# Patient Record
Sex: FEMALE | Race: WHITE | NOT HISPANIC OR LATINO | Employment: OTHER | URBAN - METROPOLITAN AREA
[De-identification: names, ages, dates, MRNs, and addresses within clinical notes are randomized per-mention and may not be internally consistent; named-entity substitution may affect disease eponyms.]

---

## 2018-10-11 ENCOUNTER — TELEPHONE (OUTPATIENT)
Dept: NEUROSURGERY | Facility: CLINIC | Age: 58
End: 2018-10-11

## 2018-10-11 DIAGNOSIS — M54.12 CERVICAL RADICULOPATHY: ICD-10-CM

## 2018-10-11 DIAGNOSIS — M48.02 SPINAL STENOSIS OF CERVICAL REGION: Primary | ICD-10-CM

## 2018-10-11 DIAGNOSIS — M47.22 CERVICAL SPONDYLOSIS WITH RADICULOPATHY: ICD-10-CM

## 2018-10-11 DIAGNOSIS — G95.9 CERVICAL MYELOPATHY: ICD-10-CM

## 2018-10-24 ENCOUNTER — HOSPITAL ENCOUNTER (OUTPATIENT)
Dept: RADIOLOGY | Facility: HOSPITAL | Age: 58
Discharge: HOME | End: 2018-10-24
Attending: NEUROLOGICAL SURGERY
Payer: COMMERCIAL

## 2018-10-24 ENCOUNTER — OFFICE VISIT (OUTPATIENT)
Dept: NEUROSURGERY | Facility: CLINIC | Age: 58
End: 2018-10-24
Payer: COMMERCIAL

## 2018-10-24 VITALS
HEART RATE: 79 BPM | TEMPERATURE: 98 F | DIASTOLIC BLOOD PRESSURE: 76 MMHG | SYSTOLIC BLOOD PRESSURE: 131 MMHG | WEIGHT: 119.31 LBS | BODY MASS INDEX: 23.3 KG/M2

## 2018-10-24 DIAGNOSIS — G95.9 CERVICAL MYELOPATHY: ICD-10-CM

## 2018-10-24 DIAGNOSIS — G56.21 ULNAR NEUROPATHY OF RIGHT UPPER EXTREMITY: ICD-10-CM

## 2018-10-24 DIAGNOSIS — M48.02 SPINAL STENOSIS OF CERVICAL REGION: ICD-10-CM

## 2018-10-24 DIAGNOSIS — M54.12 CERVICAL RADICULOPATHY: ICD-10-CM

## 2018-10-24 DIAGNOSIS — M48.02 SPINAL STENOSIS OF CERVICAL REGION: Primary | ICD-10-CM

## 2018-10-24 PROCEDURE — 72141 MRI NECK SPINE W/O DYE: CPT | Mod: 26,,, | Performed by: RADIOLOGY

## 2018-10-24 PROCEDURE — 72125 CT NECK SPINE W/O DYE: CPT | Mod: TC

## 2018-10-24 PROCEDURE — 72125 CT NECK SPINE W/O DYE: CPT | Mod: 26,,, | Performed by: RADIOLOGY

## 2018-10-24 PROCEDURE — 99999 PR PBB SHADOW E&M-EST. PATIENT-LVL III: CPT | Mod: PBBFAC,,, | Performed by: NEUROLOGICAL SURGERY

## 2018-10-24 PROCEDURE — 3078F DIAST BP <80 MM HG: CPT | Mod: CPTII,S$GLB,, | Performed by: NEUROLOGICAL SURGERY

## 2018-10-24 PROCEDURE — 3075F SYST BP GE 130 - 139MM HG: CPT | Mod: CPTII,S$GLB,, | Performed by: NEUROLOGICAL SURGERY

## 2018-10-24 PROCEDURE — 3008F BODY MASS INDEX DOCD: CPT | Mod: CPTII,S$GLB,, | Performed by: NEUROLOGICAL SURGERY

## 2018-10-24 PROCEDURE — 72141 MRI NECK SPINE W/O DYE: CPT | Mod: TC

## 2018-10-24 PROCEDURE — 99214 OFFICE O/P EST MOD 30 MIN: CPT | Mod: S$GLB,,, | Performed by: NEUROLOGICAL SURGERY

## 2018-10-24 RX ORDER — ASPIRIN 325 MG
325 TABLET ORAL
COMMUNITY
Start: 2018-09-24 | End: 2021-06-16

## 2018-10-24 RX ORDER — AZILSARTAN KAMEDOXOMIL 80 MG/1
TABLET ORAL
Refills: 1 | Status: ON HOLD | COMMUNITY
Start: 2018-08-17 | End: 2021-06-18

## 2018-10-24 RX ORDER — LORATADINE 10 MG/1
10 TABLET ORAL
Status: ON HOLD | COMMUNITY
Start: 2018-02-18 | End: 2023-06-28 | Stop reason: HOSPADM

## 2018-10-24 RX ORDER — LEVOTHYROXINE SODIUM 100 UG/1
TABLET ORAL
COMMUNITY
Start: 2018-08-16

## 2018-10-24 RX ORDER — LOSARTAN POTASSIUM 50 MG/1
50 TABLET ORAL
Status: ON HOLD | COMMUNITY
End: 2021-01-28

## 2018-10-24 RX ORDER — TRAZODONE HYDROCHLORIDE 150 MG/1
TABLET ORAL
COMMUNITY

## 2018-10-24 RX ORDER — IRBESARTAN 300 MG/1
TABLET ORAL
Refills: 5 | Status: ON HOLD | COMMUNITY
Start: 2018-09-19 | End: 2021-01-28

## 2018-10-24 RX ORDER — BUPROPION HYDROCHLORIDE 200 MG/1
TABLET, EXTENDED RELEASE ORAL
Status: ON HOLD | COMMUNITY
End: 2021-06-18

## 2018-10-24 RX ORDER — OMEPRAZOLE 40 MG/1
CAPSULE, DELAYED RELEASE ORAL
Refills: 3 | Status: ON HOLD | COMMUNITY
Start: 2018-08-01 | End: 2023-06-28 | Stop reason: HOSPADM

## 2018-10-24 RX ORDER — ESTRADIOL 0.1 MG/D
FILM, EXTENDED RELEASE TRANSDERMAL
COMMUNITY

## 2018-10-24 RX ORDER — PREGABALIN 200 MG/1
200 CAPSULE ORAL 2 TIMES DAILY
COMMUNITY
End: 2021-01-20

## 2018-10-24 NOTE — PATIENT INSTRUCTIONS
I have reviewed the imaging with the pt which shows no significant central or neuroforaminal stenosis and solid bony fusion through the instrumented levels.     I have ordered an EMG/nerve conduction study of the upper extremities.    Pt will follow up for reevaluation after the EMG/nerve conduction study is complete.

## 2018-10-24 NOTE — PROGRESS NOTES
Subjective:    I, Robin Brown, attest that this documentation has been prepared under the direction and in the presence of Marco Prasad MD.      Patient ID: Keshia Garza is a 58 y.o. female.    Chief Complaint: No chief complaint on file.    HPI   Pt is a 57 y/o female with history of cervical spondylosis with radiculopathy who presents today for follow up evaluation with new cervical CT and MRI. Pt is s/p C4-T4 posterior cervical fusion on 2/1/2016 with subsequent hardware revision on 5/9/2016. At her last visit approximately 2 years ago, pt was experiencing BUE weakness and burning bilateral hand pain. Today, pt reports right posterior shoulder pain radiating down her RUE to 4th and 5th fingers beginning 8-9 months ago. Pt has also noticed similar left sided symptoms though less severe. She states that she is losing RUE strength and dropping objects with her right hand. Pt states she has become less physically active and has not been fishing recently. Pt has previously taken gabapentin but discontinued it due to interference with another medication several years ago. Pt is currently taking Lyrica 200 mg BID.       Review of Systems   Constitutional: Negative for activity change, fatigue, fever and unexpected weight change.   HENT: Negative for facial swelling.    Eyes: Negative.    Respiratory: Negative.    Cardiovascular: Negative.    Gastrointestinal: Negative for diarrhea, nausea and vomiting.   Genitourinary: Negative.    Musculoskeletal: Negative for back pain, joint swelling, myalgias and neck pain.   Neurological: Positive for weakness (RUE). Negative for dizziness, numbness and headaches.        Positive for right shoulder pain radiating down RUE to fingers       Positive for dropping objects with right hand   Psychiatric/Behavioral: Negative.          Past Medical History:   Diagnosis Date    Benitez esophagus     Bowel obstruction     2013 and had part of small bowel removal from adhesions     Depression     Difficult intravenous access 6/23/2015    Has medardo cath    Encounter for blood transfusion     GERD (gastroesophageal reflux disease)     H. pylori infection     Treated    Hormone replacement therapy (HRT) 6/23/2015    HTN (hypertension) 6/23/2015    Hypertension     Insomnia     Megaloblastic anemia     Pancreatitis     Portacath in place     PUD (peptic ulcer disease)     bleeding ulcer-2012    Stroke     Thyroid disease     Weight loss 6/23/2015      Objective:     LMP  (LMP Unknown)      Physical Exam   Constitutional: She is oriented to person, place, and time. She appears well-developed and well-nourished.   HENT:   Head: Normocephalic and atraumatic.   Neck: Neck supple.   Neurological: She is alert and oriented to person, place, and time. No cranial nerve deficit. She displays a negative Romberg sign. GCS eye subscore is 4. GCS verbal subscore is 5. GCS motor subscore is 6.         Imaging:   MRI C-spine (10/24/2018) shows no significant central or neuroforaminal stenosis     CT C-spine (10/24/2018) shows solid bony fusion throughout the instrumented levels.       I have personally reviewed the images with the pt.       I, Dr. Marco Prasad personally performed the services described in this documentation. All medical record entries made by the scribe, Robin Brown, were at my direction and in my presence.  I have reviewed the chart and agree that the record reflects my personal performance and is accurate and complete.    Assessment:   1. Cervical stenosis.  2. S/p cervical fusion.  3. Ulnar neuropathy right>left.   Plan:     I have reviewed the imaging with the pt which shows no significant central or neuroforaminal stenosis and solid bony fusion through the instrumented levels.     I have ordered an EMG/nerve conduction study of the upper extremities.    Pt will follow up for reevaluation after the EMG/nerve conduction study is complete.

## 2020-11-17 ENCOUNTER — TELEPHONE (OUTPATIENT)
Dept: NEUROSURGERY | Facility: CLINIC | Age: 60
End: 2020-11-17

## 2020-11-17 NOTE — TELEPHONE ENCOUNTER
L/M to call back.    Pt cx'd EMG Dr Prasad ordered in 10/2018.    She needs EMG as per his order and updated imaging. Will then put her with [facility].

## 2021-01-05 ENCOUNTER — TELEPHONE (OUTPATIENT)
Dept: NEUROSURGERY | Facility: CLINIC | Age: 61
End: 2021-01-05

## 2021-01-07 ENCOUNTER — TELEPHONE (OUTPATIENT)
Dept: NEUROSURGERY | Facility: CLINIC | Age: 61
End: 2021-01-07

## 2021-01-07 DIAGNOSIS — M47.22 CERVICAL SPONDYLOSIS WITH RADICULOPATHY: Primary | ICD-10-CM

## 2021-01-07 DIAGNOSIS — M48.02 SPINAL STENOSIS OF CERVICAL REGION: ICD-10-CM

## 2021-01-14 ENCOUNTER — HOSPITAL ENCOUNTER (OUTPATIENT)
Dept: RADIOLOGY | Facility: HOSPITAL | Age: 61
Discharge: HOME | End: 2021-01-14
Attending: NEUROLOGICAL SURGERY
Payer: COMMERCIAL

## 2021-01-14 ENCOUNTER — PROCEDURE VISIT (OUTPATIENT)
Dept: PHYSICAL MEDICINE AND REHAB | Facility: CLINIC | Age: 61
End: 2021-01-14
Payer: COMMERCIAL

## 2021-01-14 DIAGNOSIS — M48.02 SPINAL STENOSIS OF CERVICAL REGION: ICD-10-CM

## 2021-01-14 DIAGNOSIS — M47.22 CERVICAL SPONDYLOSIS WITH RADICULOPATHY: ICD-10-CM

## 2021-01-14 PROCEDURE — 72125 CT NECK SPINE W/O DYE: CPT | Mod: 26,,, | Performed by: RADIOLOGY

## 2021-01-14 PROCEDURE — 72125 CT CERVICAL SPINE WITHOUT CONTRAST: ICD-10-PCS | Mod: 26,,, | Performed by: RADIOLOGY

## 2021-01-14 PROCEDURE — 95886 MUSC TEST DONE W/N TEST COMP: CPT | Mod: S$GLB,,, | Performed by: PHYSICAL MEDICINE & REHABILITATION

## 2021-01-14 PROCEDURE — 72125 CT NECK SPINE W/O DYE: CPT | Mod: TC

## 2021-01-14 PROCEDURE — 95912 PR NERVE CONDUCTION STUDY; 11 -12 STUDIES: ICD-10-PCS | Mod: S$GLB,,, | Performed by: PHYSICAL MEDICINE & REHABILITATION

## 2021-01-14 PROCEDURE — 72141 MRI NECK SPINE W/O DYE: CPT | Mod: 26,,, | Performed by: RADIOLOGY

## 2021-01-14 PROCEDURE — 95886 PR EMG COMPLETE, W/ NERVE CONDUCTION STUDIES, 5+ MUSCLES: ICD-10-PCS | Mod: S$GLB,,, | Performed by: PHYSICAL MEDICINE & REHABILITATION

## 2021-01-14 PROCEDURE — 72141 MRI CERVICAL SPINE WITHOUT CONTRAST: ICD-10-PCS | Mod: 26,,, | Performed by: RADIOLOGY

## 2021-01-14 PROCEDURE — 72141 MRI NECK SPINE W/O DYE: CPT | Mod: TC

## 2021-01-14 PROCEDURE — 95912 NRV CNDJ TEST 11-12 STUDIES: CPT | Mod: S$GLB,,, | Performed by: PHYSICAL MEDICINE & REHABILITATION

## 2021-01-20 ENCOUNTER — OFFICE VISIT (OUTPATIENT)
Dept: NEUROSURGERY | Facility: CLINIC | Age: 61
End: 2021-01-20
Payer: COMMERCIAL

## 2021-01-20 DIAGNOSIS — M48.02 SPINAL STENOSIS OF CERVICAL REGION: ICD-10-CM

## 2021-01-20 DIAGNOSIS — M47.22 CERVICAL SPONDYLOSIS WITH RADICULOPATHY: Primary | ICD-10-CM

## 2021-01-20 PROCEDURE — 99214 PR OFFICE/OUTPT VISIT, EST, LEVL IV, 30-39 MIN: ICD-10-PCS | Mod: 95,,, | Performed by: NEUROLOGICAL SURGERY

## 2021-01-20 PROCEDURE — 99214 OFFICE O/P EST MOD 30 MIN: CPT | Mod: 95,,, | Performed by: NEUROLOGICAL SURGERY

## 2021-01-20 RX ORDER — PREGABALIN 200 MG/1
200 CAPSULE ORAL 3 TIMES DAILY
Qty: 90 CAPSULE | Refills: 6 | Status: ON HOLD | OUTPATIENT
Start: 2021-01-20 | End: 2021-01-28

## 2021-01-25 ENCOUNTER — PATIENT MESSAGE (OUTPATIENT)
Dept: SURGERY | Facility: AMBULARY SURGERY CENTER | Age: 61
End: 2021-01-25

## 2021-01-25 ENCOUNTER — OFFICE VISIT (OUTPATIENT)
Dept: PAIN MEDICINE | Facility: CLINIC | Age: 61
End: 2021-01-25
Payer: COMMERCIAL

## 2021-01-25 ENCOUNTER — LAB VISIT (OUTPATIENT)
Dept: PRIMARY CARE CLINIC | Facility: CLINIC | Age: 61
End: 2021-01-25
Payer: COMMERCIAL

## 2021-01-25 VITALS
HEART RATE: 71 BPM | DIASTOLIC BLOOD PRESSURE: 80 MMHG | HEIGHT: 60 IN | SYSTOLIC BLOOD PRESSURE: 133 MMHG | WEIGHT: 119.25 LBS | BODY MASS INDEX: 23.41 KG/M2

## 2021-01-25 DIAGNOSIS — M96.1 CERVICAL POST-LAMINECTOMY SYNDROME: ICD-10-CM

## 2021-01-25 DIAGNOSIS — M47.893 OTHER SPONDYLOSIS, CERVICOTHORACIC REGION: Primary | ICD-10-CM

## 2021-01-25 DIAGNOSIS — M47.812 FACET ARTHROPATHY, CERVICAL: ICD-10-CM

## 2021-01-25 DIAGNOSIS — M47.892 OTHER SPONDYLOSIS, CERVICAL REGION: ICD-10-CM

## 2021-01-25 DIAGNOSIS — G89.4 CHRONIC PAIN DISORDER: Primary | ICD-10-CM

## 2021-01-25 DIAGNOSIS — G24.3 CERVICAL DYSTONIA: ICD-10-CM

## 2021-01-25 DIAGNOSIS — Z01.818 PREOP TESTING: Primary | ICD-10-CM

## 2021-01-25 DIAGNOSIS — Z01.818 PREOP TESTING: ICD-10-CM

## 2021-01-25 PROCEDURE — 99999 PR PBB SHADOW E&M-EST. PATIENT-LVL V: ICD-10-PCS | Mod: PBBFAC,,, | Performed by: ANESTHESIOLOGY

## 2021-01-25 PROCEDURE — 99215 OFFICE O/P EST HI 40 MIN: CPT | Mod: PBBFAC,PN | Performed by: ANESTHESIOLOGY

## 2021-01-25 PROCEDURE — U0003 INFECTIOUS AGENT DETECTION BY NUCLEIC ACID (DNA OR RNA); SEVERE ACUTE RESPIRATORY SYNDROME CORONAVIRUS 2 (SARS-COV-2) (CORONAVIRUS DISEASE [COVID-19]), AMPLIFIED PROBE TECHNIQUE, MAKING USE OF HIGH THROUGHPUT TECHNOLOGIES AS DESCRIBED BY CMS-2020-01-R: HCPCS

## 2021-01-25 PROCEDURE — 99999 PR PBB SHADOW E&M-EST. PATIENT-LVL V: CPT | Mod: PBBFAC,,, | Performed by: ANESTHESIOLOGY

## 2021-01-25 PROCEDURE — 99204 PR OFFICE/OUTPT VISIT, NEW, LEVL IV, 45-59 MIN: ICD-10-PCS | Mod: S$GLB,,, | Performed by: ANESTHESIOLOGY

## 2021-01-25 PROCEDURE — 99204 OFFICE O/P NEW MOD 45 MIN: CPT | Mod: S$GLB,,, | Performed by: ANESTHESIOLOGY

## 2021-01-25 RX ORDER — HYDROCODONE BITARTRATE AND ACETAMINOPHEN 10; 325 MG/1; MG/1
TABLET ORAL
COMMUNITY
End: 2021-01-25

## 2021-01-25 RX ORDER — HYDROCODONE BITARTRATE AND ACETAMINOPHEN 10; 325 MG/1; MG/1
1 TABLET ORAL EVERY 12 HOURS PRN
Qty: 60 TABLET | Refills: 0 | Status: SHIPPED | OUTPATIENT
Start: 2021-01-25 | End: 2021-02-08 | Stop reason: SDUPTHER

## 2021-01-25 RX ORDER — SYRINGE, DISPOSABLE, 1 ML
SYRINGE, EMPTY DISPOSABLE MISCELLANEOUS
COMMUNITY
Start: 2021-01-04

## 2021-01-25 RX ORDER — HYDROCODONE BITARTRATE AND ACETAMINOPHEN 10; 325 MG/1; MG/1
1 TABLET ORAL EVERY 12 HOURS PRN
Qty: 60 TABLET | Refills: 0 | Status: SHIPPED | OUTPATIENT
Start: 2021-02-23 | End: 2021-03-01

## 2021-01-26 LAB — SARS-COV-2 RNA RESP QL NAA+PROBE: NOT DETECTED

## 2021-01-28 ENCOUNTER — HOSPITAL ENCOUNTER (OUTPATIENT)
Facility: AMBULARY SURGERY CENTER | Age: 61
Discharge: HOME | End: 2021-01-28
Attending: ANESTHESIOLOGY | Admitting: ANESTHESIOLOGY
Payer: COMMERCIAL

## 2021-01-28 DIAGNOSIS — M47.812 CERVICAL SPONDYLOSIS: Primary | ICD-10-CM

## 2021-01-28 DIAGNOSIS — M47.892 OTHER SPONDYLOSIS, CERVICAL REGION: ICD-10-CM

## 2021-01-28 PROCEDURE — 64491 INJ PARAVERT F JNT C/T 2 LEV: CPT | Performed by: ANESTHESIOLOGY

## 2021-01-28 PROCEDURE — 64491 PR INJ DX/THER AGNT PARAVERT FACET JOINT,IMG GUIDE,CERV/THORAC, 2ND LEVEL: ICD-10-PCS | Mod: RT,,, | Performed by: ANESTHESIOLOGY

## 2021-01-28 PROCEDURE — 64492 PR INJ DX/THER AGNT PARAVERT FACET JOINT,IMG GUIDE,CERV/THORAC, ADD LEVEL: ICD-10-PCS | Mod: RT,,, | Performed by: ANESTHESIOLOGY

## 2021-01-28 PROCEDURE — 64492 INJ PARAVERT F JNT C/T 3 LEV: CPT | Performed by: ANESTHESIOLOGY

## 2021-01-28 PROCEDURE — 64490 INJ PARAVERT F JNT C/T 1 LEV: CPT | Performed by: ANESTHESIOLOGY

## 2021-01-28 PROCEDURE — 64490 PR INJ DX/THER AGNT PARAVERT FACET JOINT,IMG GUIDE,CERV/THORAC, 1ST LEVEL: ICD-10-PCS | Mod: RT,,, | Performed by: ANESTHESIOLOGY

## 2021-01-28 PROCEDURE — 64490 INJ PARAVERT F JNT C/T 1 LEV: CPT | Mod: RT,,, | Performed by: ANESTHESIOLOGY

## 2021-01-28 PROCEDURE — 64492 INJ PARAVERT F JNT C/T 3 LEV: CPT | Mod: RT,,, | Performed by: ANESTHESIOLOGY

## 2021-01-28 PROCEDURE — 64491 INJ PARAVERT F JNT C/T 2 LEV: CPT | Mod: RT,,, | Performed by: ANESTHESIOLOGY

## 2021-01-28 RX ORDER — BUPIVACAINE HYDROCHLORIDE 5 MG/ML
INJECTION, SOLUTION EPIDURAL; INTRACAUDAL
Status: DISCONTINUED | OUTPATIENT
Start: 2021-01-28 | End: 2021-01-28 | Stop reason: HOSPADM

## 2021-01-28 RX ORDER — LIDOCAINE HYDROCHLORIDE 10 MG/ML
INJECTION, SOLUTION EPIDURAL; INFILTRATION; INTRACAUDAL; PERINEURAL
Status: DISCONTINUED | OUTPATIENT
Start: 2021-01-28 | End: 2021-01-28 | Stop reason: HOSPADM

## 2021-01-28 RX ORDER — SODIUM CHLORIDE, SODIUM LACTATE, POTASSIUM CHLORIDE, CALCIUM CHLORIDE 600; 310; 30; 20 MG/100ML; MG/100ML; MG/100ML; MG/100ML
INJECTION, SOLUTION INTRAVENOUS CONTINUOUS
Status: DISCONTINUED | OUTPATIENT
Start: 2021-01-28 | End: 2021-01-28 | Stop reason: HOSPADM

## 2021-01-28 RX ORDER — MIDAZOLAM HYDROCHLORIDE 2 MG/2ML
INJECTION, SOLUTION INTRAMUSCULAR; INTRAVENOUS
Status: DISCONTINUED | OUTPATIENT
Start: 2021-01-28 | End: 2021-01-28 | Stop reason: HOSPADM

## 2021-01-28 RX ADMIN — SODIUM CHLORIDE, SODIUM LACTATE, POTASSIUM CHLORIDE, CALCIUM CHLORIDE: 600; 310; 30; 20 INJECTION, SOLUTION INTRAVENOUS at 01:01

## 2021-01-29 ENCOUNTER — PATIENT MESSAGE (OUTPATIENT)
Dept: PAIN MEDICINE | Facility: CLINIC | Age: 61
End: 2021-01-29

## 2021-01-29 VITALS
BODY MASS INDEX: 23.29 KG/M2 | DIASTOLIC BLOOD PRESSURE: 82 MMHG | HEART RATE: 80 BPM | TEMPERATURE: 98 F | WEIGHT: 119.25 LBS | OXYGEN SATURATION: 99 % | RESPIRATION RATE: 20 BRPM | SYSTOLIC BLOOD PRESSURE: 142 MMHG

## 2021-01-29 DIAGNOSIS — M47.892 OTHER SPONDYLOSIS, CERVICAL REGION: Primary | ICD-10-CM

## 2021-01-30 ENCOUNTER — LAB VISIT (OUTPATIENT)
Dept: PRIMARY CARE CLINIC | Facility: CLINIC | Age: 61
End: 2021-01-30
Payer: COMMERCIAL

## 2021-01-30 DIAGNOSIS — M47.892 OTHER SPONDYLOSIS, CERVICAL REGION: ICD-10-CM

## 2021-01-30 PROCEDURE — U0003 INFECTIOUS AGENT DETECTION BY NUCLEIC ACID (DNA OR RNA); SEVERE ACUTE RESPIRATORY SYNDROME CORONAVIRUS 2 (SARS-COV-2) (CORONAVIRUS DISEASE [COVID-19]), AMPLIFIED PROBE TECHNIQUE, MAKING USE OF HIGH THROUGHPUT TECHNOLOGIES AS DESCRIBED BY CMS-2020-01-R: HCPCS

## 2021-01-31 LAB — SARS-COV-2 RNA RESP QL NAA+PROBE: NOT DETECTED

## 2021-02-02 ENCOUNTER — HOSPITAL ENCOUNTER (OUTPATIENT)
Facility: AMBULARY SURGERY CENTER | Age: 61
Discharge: HOME | End: 2021-02-02
Attending: ANESTHESIOLOGY | Admitting: ANESTHESIOLOGY
Payer: COMMERCIAL

## 2021-02-02 DIAGNOSIS — M47.892 OTHER SPONDYLOSIS, CERVICAL REGION: Primary | ICD-10-CM

## 2021-02-02 PROCEDURE — 64490 INJ PARAVERT F JNT C/T 1 LEV: CPT | Performed by: ANESTHESIOLOGY

## 2021-02-02 PROCEDURE — 64492 INJ PARAVERT F JNT C/T 3 LEV: CPT | Mod: RT,,, | Performed by: ANESTHESIOLOGY

## 2021-02-02 PROCEDURE — 64491 PR INJ DX/THER AGNT PARAVERT FACET JOINT,IMG GUIDE,CERV/THORAC, 2ND LEVEL: ICD-10-PCS | Mod: RT,,, | Performed by: ANESTHESIOLOGY

## 2021-02-02 PROCEDURE — 64492 PR INJ DX/THER AGNT PARAVERT FACET JOINT,IMG GUIDE,CERV/THORAC, ADD LEVEL: ICD-10-PCS | Mod: RT,,, | Performed by: ANESTHESIOLOGY

## 2021-02-02 PROCEDURE — 64490 PR INJ DX/THER AGNT PARAVERT FACET JOINT,IMG GUIDE,CERV/THORAC, 1ST LEVEL: ICD-10-PCS | Mod: RT,,, | Performed by: ANESTHESIOLOGY

## 2021-02-02 PROCEDURE — 64491 INJ PARAVERT F JNT C/T 2 LEV: CPT | Performed by: ANESTHESIOLOGY

## 2021-02-02 PROCEDURE — 64490 INJ PARAVERT F JNT C/T 1 LEV: CPT | Mod: RT,,, | Performed by: ANESTHESIOLOGY

## 2021-02-02 PROCEDURE — 64491 INJ PARAVERT F JNT C/T 2 LEV: CPT | Mod: RT,,, | Performed by: ANESTHESIOLOGY

## 2021-02-02 PROCEDURE — 64492 INJ PARAVERT F JNT C/T 3 LEV: CPT | Performed by: ANESTHESIOLOGY

## 2021-02-02 RX ORDER — MIDAZOLAM HYDROCHLORIDE 2 MG/2ML
INJECTION, SOLUTION INTRAMUSCULAR; INTRAVENOUS
Status: DISCONTINUED | OUTPATIENT
Start: 2021-02-02 | End: 2021-02-02 | Stop reason: HOSPADM

## 2021-02-02 RX ORDER — BUPIVACAINE HYDROCHLORIDE 5 MG/ML
INJECTION, SOLUTION EPIDURAL; INTRACAUDAL
Status: DISCONTINUED | OUTPATIENT
Start: 2021-02-02 | End: 2021-02-02 | Stop reason: HOSPADM

## 2021-02-02 RX ORDER — LIDOCAINE HYDROCHLORIDE 10 MG/ML
INJECTION, SOLUTION EPIDURAL; INFILTRATION; INTRACAUDAL; PERINEURAL
Status: DISCONTINUED | OUTPATIENT
Start: 2021-02-02 | End: 2021-02-02 | Stop reason: HOSPADM

## 2021-02-02 RX ORDER — SODIUM CHLORIDE, SODIUM LACTATE, POTASSIUM CHLORIDE, CALCIUM CHLORIDE 600; 310; 30; 20 MG/100ML; MG/100ML; MG/100ML; MG/100ML
INJECTION, SOLUTION INTRAVENOUS CONTINUOUS
Status: DISCONTINUED | OUTPATIENT
Start: 2021-02-02 | End: 2021-02-02 | Stop reason: HOSPADM

## 2021-02-02 RX ADMIN — SODIUM CHLORIDE, SODIUM LACTATE, POTASSIUM CHLORIDE, CALCIUM CHLORIDE: 600; 310; 30; 20 INJECTION, SOLUTION INTRAVENOUS at 01:02

## 2021-02-03 ENCOUNTER — PATIENT MESSAGE (OUTPATIENT)
Dept: PAIN MEDICINE | Facility: CLINIC | Age: 61
End: 2021-02-03

## 2021-02-03 VITALS
SYSTOLIC BLOOD PRESSURE: 146 MMHG | DIASTOLIC BLOOD PRESSURE: 76 MMHG | RESPIRATION RATE: 20 BRPM | BODY MASS INDEX: 23.36 KG/M2 | TEMPERATURE: 98 F | HEIGHT: 60 IN | WEIGHT: 119 LBS | OXYGEN SATURATION: 98 % | HEART RATE: 75 BPM

## 2021-02-03 DIAGNOSIS — M47.812 FACET ARTHROPATHY, CERVICAL: ICD-10-CM

## 2021-02-03 DIAGNOSIS — M47.892 OTHER SPONDYLOSIS, CERVICAL REGION: Primary | ICD-10-CM

## 2021-02-03 DIAGNOSIS — Z01.818 PREOP TESTING: ICD-10-CM

## 2021-02-05 ENCOUNTER — TELEPHONE (OUTPATIENT)
Dept: PAIN MEDICINE | Facility: CLINIC | Age: 61
End: 2021-02-05

## 2021-02-07 ENCOUNTER — PATIENT MESSAGE (OUTPATIENT)
Dept: SURGERY | Facility: AMBULARY SURGERY CENTER | Age: 61
End: 2021-02-07

## 2021-02-08 ENCOUNTER — PATIENT MESSAGE (OUTPATIENT)
Dept: SURGERY | Facility: AMBULARY SURGERY CENTER | Age: 61
End: 2021-02-08

## 2021-02-08 ENCOUNTER — TELEPHONE (OUTPATIENT)
Dept: PAIN MEDICINE | Facility: CLINIC | Age: 61
End: 2021-02-08

## 2021-02-09 ENCOUNTER — PATIENT MESSAGE (OUTPATIENT)
Dept: PAIN MEDICINE | Facility: CLINIC | Age: 61
End: 2021-02-09

## 2021-02-11 ENCOUNTER — PATIENT MESSAGE (OUTPATIENT)
Dept: SURGERY | Facility: AMBULARY SURGERY CENTER | Age: 61
End: 2021-02-11

## 2021-02-17 ENCOUNTER — PATIENT MESSAGE (OUTPATIENT)
Dept: PAIN MEDICINE | Facility: CLINIC | Age: 61
End: 2021-02-17

## 2021-02-19 ENCOUNTER — PATIENT MESSAGE (OUTPATIENT)
Dept: PAIN MEDICINE | Facility: CLINIC | Age: 61
End: 2021-02-19

## 2021-02-20 ENCOUNTER — LAB VISIT (OUTPATIENT)
Dept: PRIMARY CARE CLINIC | Facility: CLINIC | Age: 61
End: 2021-02-20
Payer: COMMERCIAL

## 2021-02-20 DIAGNOSIS — Z01.818 PREOP TESTING: ICD-10-CM

## 2021-02-20 PROCEDURE — U0005 INFEC AGEN DETEC AMPLI PROBE: HCPCS

## 2021-02-20 PROCEDURE — U0003 INFECTIOUS AGENT DETECTION BY NUCLEIC ACID (DNA OR RNA); SEVERE ACUTE RESPIRATORY SYNDROME CORONAVIRUS 2 (SARS-COV-2) (CORONAVIRUS DISEASE [COVID-19]), AMPLIFIED PROBE TECHNIQUE, MAKING USE OF HIGH THROUGHPUT TECHNOLOGIES AS DESCRIBED BY CMS-2020-01-R: HCPCS

## 2021-02-21 LAB — SARS-COV-2 RNA RESP QL NAA+PROBE: NOT DETECTED

## 2021-02-22 ENCOUNTER — OFFICE VISIT (OUTPATIENT)
Dept: PAIN MEDICINE | Facility: CLINIC | Age: 61
End: 2021-02-22
Payer: COMMERCIAL

## 2021-02-22 VITALS
DIASTOLIC BLOOD PRESSURE: 80 MMHG | HEIGHT: 60 IN | SYSTOLIC BLOOD PRESSURE: 135 MMHG | HEART RATE: 67 BPM | BODY MASS INDEX: 23.36 KG/M2 | WEIGHT: 119 LBS

## 2021-02-22 DIAGNOSIS — G89.4 CHRONIC PAIN DISORDER: ICD-10-CM

## 2021-02-22 DIAGNOSIS — G24.3 CERVICAL DYSTONIA: Primary | ICD-10-CM

## 2021-02-22 DIAGNOSIS — M96.1 CERVICAL POST-LAMINECTOMY SYNDROME: ICD-10-CM

## 2021-02-22 PROCEDURE — 3075F PR MOST RECENT SYSTOLIC BLOOD PRESS GE 130-139MM HG: ICD-10-PCS | Mod: CPTII,S$GLB,, | Performed by: ANESTHESIOLOGY

## 2021-02-22 PROCEDURE — 3075F SYST BP GE 130 - 139MM HG: CPT | Mod: CPTII,S$GLB,, | Performed by: ANESTHESIOLOGY

## 2021-02-22 PROCEDURE — 1125F AMNT PAIN NOTED PAIN PRSNT: CPT | Mod: S$GLB,,, | Performed by: ANESTHESIOLOGY

## 2021-02-22 PROCEDURE — 99214 PR OFFICE/OUTPT VISIT, EST, LEVL IV, 30-39 MIN: ICD-10-PCS | Mod: S$GLB,,, | Performed by: ANESTHESIOLOGY

## 2021-02-22 PROCEDURE — 3008F PR BODY MASS INDEX (BMI) DOCUMENTED: ICD-10-PCS | Mod: CPTII,S$GLB,, | Performed by: ANESTHESIOLOGY

## 2021-02-22 PROCEDURE — 3079F DIAST BP 80-89 MM HG: CPT | Mod: CPTII,S$GLB,, | Performed by: ANESTHESIOLOGY

## 2021-02-22 PROCEDURE — 99999 PR PBB SHADOW E&M-EST. PATIENT-LVL III: ICD-10-PCS | Mod: PBBFAC,,, | Performed by: ANESTHESIOLOGY

## 2021-02-22 PROCEDURE — 1125F PR PAIN SEVERITY QUANTIFIED, PAIN PRESENT: ICD-10-PCS | Mod: S$GLB,,, | Performed by: ANESTHESIOLOGY

## 2021-02-22 PROCEDURE — 3008F BODY MASS INDEX DOCD: CPT | Mod: CPTII,S$GLB,, | Performed by: ANESTHESIOLOGY

## 2021-02-22 PROCEDURE — 99214 OFFICE O/P EST MOD 30 MIN: CPT | Mod: S$GLB,,, | Performed by: ANESTHESIOLOGY

## 2021-02-22 PROCEDURE — 99999 PR PBB SHADOW E&M-EST. PATIENT-LVL III: CPT | Mod: PBBFAC,,, | Performed by: ANESTHESIOLOGY

## 2021-02-22 PROCEDURE — 3079F PR MOST RECENT DIASTOLIC BLOOD PRESSURE 80-89 MM HG: ICD-10-PCS | Mod: CPTII,S$GLB,, | Performed by: ANESTHESIOLOGY

## 2021-03-01 ENCOUNTER — OFFICE VISIT (OUTPATIENT)
Dept: PAIN MEDICINE | Facility: CLINIC | Age: 61
End: 2021-03-01
Payer: COMMERCIAL

## 2021-03-01 VITALS
BODY MASS INDEX: 23.36 KG/M2 | WEIGHT: 119 LBS | DIASTOLIC BLOOD PRESSURE: 82 MMHG | HEIGHT: 60 IN | HEART RATE: 61 BPM | SYSTOLIC BLOOD PRESSURE: 130 MMHG

## 2021-03-01 DIAGNOSIS — M96.1 CERVICAL POST-LAMINECTOMY SYNDROME: ICD-10-CM

## 2021-03-01 DIAGNOSIS — G24.3 CERVICAL DYSTONIA: Primary | ICD-10-CM

## 2021-03-01 DIAGNOSIS — G89.4 CHRONIC PAIN DISORDER: ICD-10-CM

## 2021-03-01 PROCEDURE — 1125F AMNT PAIN NOTED PAIN PRSNT: CPT | Mod: S$GLB,,, | Performed by: ANESTHESIOLOGY

## 2021-03-01 PROCEDURE — 99999 PR PBB SHADOW E&M-EST. PATIENT-LVL IV: CPT | Mod: PBBFAC,,, | Performed by: ANESTHESIOLOGY

## 2021-03-01 PROCEDURE — 99214 OFFICE O/P EST MOD 30 MIN: CPT | Mod: 25,S$GLB,, | Performed by: ANESTHESIOLOGY

## 2021-03-01 PROCEDURE — 3079F DIAST BP 80-89 MM HG: CPT | Mod: CPTII,S$GLB,, | Performed by: ANESTHESIOLOGY

## 2021-03-01 PROCEDURE — 64616 PR CHEMODENERVATION NECK MUSCLES EXC LARNYNX, UNI: ICD-10-PCS | Mod: RT,S$GLB,, | Performed by: ANESTHESIOLOGY

## 2021-03-01 PROCEDURE — 3008F BODY MASS INDEX DOCD: CPT | Mod: CPTII,S$GLB,, | Performed by: ANESTHESIOLOGY

## 2021-03-01 PROCEDURE — 1125F PR PAIN SEVERITY QUANTIFIED, PAIN PRESENT: ICD-10-PCS | Mod: S$GLB,,, | Performed by: ANESTHESIOLOGY

## 2021-03-01 PROCEDURE — 3075F PR MOST RECENT SYSTOLIC BLOOD PRESS GE 130-139MM HG: ICD-10-PCS | Mod: CPTII,S$GLB,, | Performed by: ANESTHESIOLOGY

## 2021-03-01 PROCEDURE — 99999 PR PBB SHADOW E&M-EST. PATIENT-LVL IV: ICD-10-PCS | Mod: PBBFAC,,, | Performed by: ANESTHESIOLOGY

## 2021-03-01 PROCEDURE — 99214 PR OFFICE/OUTPT VISIT, EST, LEVL IV, 30-39 MIN: ICD-10-PCS | Mod: 25,S$GLB,, | Performed by: ANESTHESIOLOGY

## 2021-03-01 PROCEDURE — 3075F SYST BP GE 130 - 139MM HG: CPT | Mod: CPTII,S$GLB,, | Performed by: ANESTHESIOLOGY

## 2021-03-01 PROCEDURE — 3008F PR BODY MASS INDEX (BMI) DOCUMENTED: ICD-10-PCS | Mod: CPTII,S$GLB,, | Performed by: ANESTHESIOLOGY

## 2021-03-01 PROCEDURE — 64616 CHEMODENERV MUSC NECK DYSTON: CPT | Mod: RT,S$GLB,, | Performed by: ANESTHESIOLOGY

## 2021-03-01 PROCEDURE — 3079F PR MOST RECENT DIASTOLIC BLOOD PRESSURE 80-89 MM HG: ICD-10-PCS | Mod: CPTII,S$GLB,, | Performed by: ANESTHESIOLOGY

## 2021-03-01 RX ORDER — HYDROCODONE BITARTRATE AND ACETAMINOPHEN 7.5; 325 MG/1; MG/1
1 TABLET ORAL EVERY 8 HOURS PRN
Qty: 90 TABLET | Refills: 0 | Status: SHIPPED | OUTPATIENT
Start: 2021-03-30 | End: 2021-04-29

## 2021-03-01 RX ORDER — HYDROCODONE BITARTRATE AND ACETAMINOPHEN 7.5; 325 MG/1; MG/1
1 TABLET ORAL EVERY 8 HOURS PRN
Qty: 90 TABLET | Refills: 0 | Status: SHIPPED | OUTPATIENT
Start: 2021-03-01 | End: 2021-03-31

## 2021-03-01 RX ORDER — HYDROCODONE BITARTRATE AND ACETAMINOPHEN 7.5; 325 MG/1; MG/1
1 TABLET ORAL EVERY 8 HOURS PRN
Qty: 90 TABLET | Refills: 0 | Status: SHIPPED | OUTPATIENT
Start: 2021-04-28 | End: 2021-06-01 | Stop reason: SDUPTHER

## 2021-03-10 ENCOUNTER — OFFICE VISIT (OUTPATIENT)
Dept: NEUROSURGERY | Facility: CLINIC | Age: 61
End: 2021-03-10
Payer: COMMERCIAL

## 2021-03-10 VITALS — DIASTOLIC BLOOD PRESSURE: 87 MMHG | SYSTOLIC BLOOD PRESSURE: 131 MMHG | HEART RATE: 69 BPM

## 2021-03-10 DIAGNOSIS — M48.02 SPINAL STENOSIS OF CERVICAL REGION: Primary | ICD-10-CM

## 2021-03-10 PROCEDURE — 3075F SYST BP GE 130 - 139MM HG: CPT | Mod: CPTII,S$GLB,, | Performed by: PHYSICIAN ASSISTANT

## 2021-03-10 PROCEDURE — 3075F PR MOST RECENT SYSTOLIC BLOOD PRESS GE 130-139MM HG: ICD-10-PCS | Mod: CPTII,S$GLB,, | Performed by: PHYSICIAN ASSISTANT

## 2021-03-10 PROCEDURE — 99213 PR OFFICE/OUTPT VISIT, EST, LEVL III, 20-29 MIN: ICD-10-PCS | Mod: S$GLB,,, | Performed by: PHYSICIAN ASSISTANT

## 2021-03-10 PROCEDURE — 3079F DIAST BP 80-89 MM HG: CPT | Mod: CPTII,S$GLB,, | Performed by: PHYSICIAN ASSISTANT

## 2021-03-10 PROCEDURE — 3079F PR MOST RECENT DIASTOLIC BLOOD PRESSURE 80-89 MM HG: ICD-10-PCS | Mod: CPTII,S$GLB,, | Performed by: PHYSICIAN ASSISTANT

## 2021-03-10 PROCEDURE — 1125F AMNT PAIN NOTED PAIN PRSNT: CPT | Mod: S$GLB,,, | Performed by: PHYSICIAN ASSISTANT

## 2021-03-10 PROCEDURE — 99999 PR PBB SHADOW E&M-EST. PATIENT-LVL III: ICD-10-PCS | Mod: PBBFAC,,, | Performed by: PHYSICIAN ASSISTANT

## 2021-03-10 PROCEDURE — 99999 PR PBB SHADOW E&M-EST. PATIENT-LVL III: CPT | Mod: PBBFAC,,, | Performed by: PHYSICIAN ASSISTANT

## 2021-03-10 PROCEDURE — 1125F PR PAIN SEVERITY QUANTIFIED, PAIN PRESENT: ICD-10-PCS | Mod: S$GLB,,, | Performed by: PHYSICIAN ASSISTANT

## 2021-03-10 PROCEDURE — 99213 OFFICE O/P EST LOW 20 MIN: CPT | Mod: S$GLB,,, | Performed by: PHYSICIAN ASSISTANT

## 2021-03-17 ENCOUNTER — OFFICE VISIT (OUTPATIENT)
Dept: PHYSICAL MEDICINE AND REHAB | Facility: CLINIC | Age: 61
End: 2021-03-17
Payer: COMMERCIAL

## 2021-03-17 VITALS
BODY MASS INDEX: 23.36 KG/M2 | DIASTOLIC BLOOD PRESSURE: 90 MMHG | SYSTOLIC BLOOD PRESSURE: 158 MMHG | HEIGHT: 60 IN | HEART RATE: 65 BPM | WEIGHT: 119 LBS

## 2021-03-17 DIAGNOSIS — M48.02 SPINAL STENOSIS OF CERVICAL REGION: ICD-10-CM

## 2021-03-17 PROCEDURE — 1125F PR PAIN SEVERITY QUANTIFIED, PAIN PRESENT: ICD-10-PCS | Mod: S$GLB,,, | Performed by: PHYSICAL MEDICINE & REHABILITATION

## 2021-03-17 PROCEDURE — 20553 NJX 1/MLT TRIGGER POINTS 3/>: CPT | Mod: S$GLB,,, | Performed by: PHYSICAL MEDICINE & REHABILITATION

## 2021-03-17 PROCEDURE — 3008F BODY MASS INDEX DOCD: CPT | Mod: CPTII,S$GLB,, | Performed by: PHYSICAL MEDICINE & REHABILITATION

## 2021-03-17 PROCEDURE — 99999 PR PBB SHADOW E&M-EST. PATIENT-LVL IV: ICD-10-PCS | Mod: PBBFAC,,, | Performed by: PHYSICAL MEDICINE & REHABILITATION

## 2021-03-17 PROCEDURE — 1125F AMNT PAIN NOTED PAIN PRSNT: CPT | Mod: S$GLB,,, | Performed by: PHYSICAL MEDICINE & REHABILITATION

## 2021-03-17 PROCEDURE — 99214 OFFICE O/P EST MOD 30 MIN: CPT | Mod: 25,S$GLB,, | Performed by: PHYSICAL MEDICINE & REHABILITATION

## 2021-03-17 PROCEDURE — 99999 PR PBB SHADOW E&M-EST. PATIENT-LVL IV: CPT | Mod: PBBFAC,,, | Performed by: PHYSICAL MEDICINE & REHABILITATION

## 2021-03-17 PROCEDURE — 20553 PR INJECT TRIGGER POINTS, > 3: ICD-10-PCS | Mod: S$GLB,,, | Performed by: PHYSICAL MEDICINE & REHABILITATION

## 2021-03-17 PROCEDURE — 3008F PR BODY MASS INDEX (BMI) DOCUMENTED: ICD-10-PCS | Mod: CPTII,S$GLB,, | Performed by: PHYSICAL MEDICINE & REHABILITATION

## 2021-03-17 PROCEDURE — 99214 PR OFFICE/OUTPT VISIT, EST, LEVL IV, 30-39 MIN: ICD-10-PCS | Mod: 25,S$GLB,, | Performed by: PHYSICAL MEDICINE & REHABILITATION

## 2021-03-17 RX ORDER — LIDOCAINE HYDROCHLORIDE 10 MG/ML
3 INJECTION INFILTRATION; PERINEURAL ONCE
Status: COMPLETED | OUTPATIENT
Start: 2021-03-17 | End: 2021-03-17

## 2021-03-17 RX ORDER — BACLOFEN 10 MG/1
10 TABLET ORAL NIGHTLY
Qty: 30 TABLET | Refills: 6 | Status: ON HOLD | OUTPATIENT
Start: 2021-03-17 | End: 2023-06-28 | Stop reason: HOSPADM

## 2021-03-17 RX ORDER — LANSOPRAZOLE 30 MG/1
30 CAPSULE, DELAYED RELEASE ORAL
Status: ON HOLD | COMMUNITY
Start: 2020-07-20 | End: 2021-06-18

## 2021-03-17 RX ADMIN — LIDOCAINE HYDROCHLORIDE 3 ML: 10 INJECTION INFILTRATION; PERINEURAL at 11:03

## 2021-05-12 ENCOUNTER — PATIENT MESSAGE (OUTPATIENT)
Dept: RESEARCH | Facility: HOSPITAL | Age: 61
End: 2021-05-12

## 2021-06-01 ENCOUNTER — PATIENT MESSAGE (OUTPATIENT)
Dept: PAIN MEDICINE | Facility: CLINIC | Age: 61
End: 2021-06-01

## 2021-06-10 ENCOUNTER — OFFICE VISIT (OUTPATIENT)
Dept: PAIN MEDICINE | Facility: CLINIC | Age: 61
End: 2021-06-10
Payer: MEDICARE

## 2021-06-10 VITALS
HEIGHT: 60 IN | WEIGHT: 119 LBS | DIASTOLIC BLOOD PRESSURE: 88 MMHG | HEART RATE: 67 BPM | SYSTOLIC BLOOD PRESSURE: 148 MMHG | BODY MASS INDEX: 23.36 KG/M2

## 2021-06-10 DIAGNOSIS — G89.4 CHRONIC PAIN DISORDER: ICD-10-CM

## 2021-06-10 DIAGNOSIS — G24.3 CERVICAL DYSTONIA: ICD-10-CM

## 2021-06-10 DIAGNOSIS — F11.90 CHRONIC, CONTINUOUS USE OF OPIOIDS: ICD-10-CM

## 2021-06-10 DIAGNOSIS — M96.1 CERVICAL POST-LAMINECTOMY SYNDROME: ICD-10-CM

## 2021-06-10 DIAGNOSIS — M47.892 OTHER SPONDYLOSIS, CERVICAL REGION: Primary | ICD-10-CM

## 2021-06-10 PROCEDURE — 99214 PR OFFICE/OUTPT VISIT, EST, LEVL IV, 30-39 MIN: ICD-10-PCS | Mod: 25,S$GLB,, | Performed by: ANESTHESIOLOGY

## 2021-06-10 PROCEDURE — 99214 OFFICE O/P EST MOD 30 MIN: CPT | Mod: 25,S$GLB,, | Performed by: ANESTHESIOLOGY

## 2021-06-10 PROCEDURE — 64616 CHEMODENERV MUSC NECK DYSTON: CPT | Mod: RT,S$GLB,, | Performed by: ANESTHESIOLOGY

## 2021-06-10 PROCEDURE — 99999 PR PBB SHADOW E&M-EST. PATIENT-LVL III: CPT | Mod: PBBFAC,,, | Performed by: ANESTHESIOLOGY

## 2021-06-10 PROCEDURE — 3008F BODY MASS INDEX DOCD: CPT | Mod: CPTII,S$GLB,, | Performed by: ANESTHESIOLOGY

## 2021-06-10 PROCEDURE — 80307 DRUG TEST PRSMV CHEM ANLYZR: CPT | Performed by: ANESTHESIOLOGY

## 2021-06-10 PROCEDURE — 99999 PR PBB SHADOW E&M-EST. PATIENT-LVL III: ICD-10-PCS | Mod: PBBFAC,,, | Performed by: ANESTHESIOLOGY

## 2021-06-10 PROCEDURE — 3008F PR BODY MASS INDEX (BMI) DOCUMENTED: ICD-10-PCS | Mod: CPTII,S$GLB,, | Performed by: ANESTHESIOLOGY

## 2021-06-10 PROCEDURE — 64616 PR CHEMODENERVATION NECK MUSCLES EXC LARNYNX, UNI: ICD-10-PCS | Mod: RT,S$GLB,, | Performed by: ANESTHESIOLOGY

## 2021-06-10 PROCEDURE — 1125F PR PAIN SEVERITY QUANTIFIED, PAIN PRESENT: ICD-10-PCS | Mod: S$GLB,,, | Performed by: ANESTHESIOLOGY

## 2021-06-10 PROCEDURE — 1125F AMNT PAIN NOTED PAIN PRSNT: CPT | Mod: S$GLB,,, | Performed by: ANESTHESIOLOGY

## 2021-06-10 RX ORDER — HYDROCODONE BITARTRATE AND ACETAMINOPHEN 10; 325 MG/1; MG/1
1 TABLET ORAL EVERY 12 HOURS PRN
Qty: 60 TABLET | Refills: 0 | Status: SHIPPED | OUTPATIENT
Start: 2021-06-30 | End: 2021-07-30

## 2021-06-10 RX ORDER — HYDROCODONE BITARTRATE AND ACETAMINOPHEN 10; 325 MG/1; MG/1
1 TABLET ORAL EVERY 12 HOURS PRN
Qty: 60 TABLET | Refills: 0 | Status: SHIPPED | OUTPATIENT
Start: 2021-07-29 | End: 2021-08-28

## 2021-06-15 ENCOUNTER — LAB VISIT (OUTPATIENT)
Dept: PRIMARY CARE CLINIC | Facility: CLINIC | Age: 61
End: 2021-06-15
Payer: MEDICARE

## 2021-06-15 DIAGNOSIS — Z01.818 PRE-OP TESTING: ICD-10-CM

## 2021-06-15 PROCEDURE — U0003 INFECTIOUS AGENT DETECTION BY NUCLEIC ACID (DNA OR RNA); SEVERE ACUTE RESPIRATORY SYNDROME CORONAVIRUS 2 (SARS-COV-2) (CORONAVIRUS DISEASE [COVID-19]), AMPLIFIED PROBE TECHNIQUE, MAKING USE OF HIGH THROUGHPUT TECHNOLOGIES AS DESCRIBED BY CMS-2020-01-R: HCPCS | Performed by: ANESTHESIOLOGY

## 2021-06-15 PROCEDURE — U0005 INFEC AGEN DETEC AMPLI PROBE: HCPCS | Performed by: ANESTHESIOLOGY

## 2021-06-16 LAB
6MAM UR QL: NOT DETECTED
7AMINOCLONAZEPAM UR QL: NOT DETECTED
A-OH ALPRAZ UR QL: NOT DETECTED
ALPHA-OH-MIDAZOLAM: NOT DETECTED
ALPRAZ UR QL: NOT DETECTED
AMPHET UR QL SCN: NOT DETECTED
ANNOTATION COMMENT IMP: NORMAL
ANNOTATION COMMENT IMP: NORMAL
BARBITURATES UR QL: NOT DETECTED
BUPRENORPHINE UR QL: NOT DETECTED
BZE UR QL: NOT DETECTED
CARBOXYTHC UR QL: NOT DETECTED
CARISOPRODOL UR QL: NOT DETECTED
CLONAZEPAM UR QL: NOT DETECTED
CODEINE UR QL: NOT DETECTED
CREAT UR-MCNC: 25.7 MG/DL (ref 20–400)
DIAZEPAM UR QL: NOT DETECTED
ETHYL GLUCURONIDE UR QL: NOT DETECTED
FENTANYL UR QL: NOT DETECTED
GABAPENTIN: NOT DETECTED
HYDROCODONE UR QL: PRESENT
HYDROMORPHONE UR QL: NOT DETECTED
LORAZEPAM UR QL: NOT DETECTED
MDA UR QL: NOT DETECTED
MDEA UR QL: NOT DETECTED
MDMA UR QL: NOT DETECTED
ME-PHENIDATE UR QL: NOT DETECTED
METHADONE UR QL: NOT DETECTED
METHAMPHET UR QL: NOT DETECTED
MIDAZOLAM UR QL SCN: NOT DETECTED
MORPHINE UR QL: NOT DETECTED
NALOXONE: NOT DETECTED
NORBUPRENORPHINE UR QL CFM: NOT DETECTED
NORDIAZEPAM UR QL: NOT DETECTED
NORFENTANYL UR QL: NOT DETECTED
NORHYDROCODONE UR QL CFM: PRESENT
NORMEPERIDINE UR QL CFM: NOT DETECTED
NOROXYCODONE UR QL CFM: NOT DETECTED
NOROXYMORPHONE UR QL SCN: NOT DETECTED
OXAZEPAM UR QL: NOT DETECTED
OXYCODONE UR QL: NOT DETECTED
OXYMORPHONE UR QL: NOT DETECTED
PATHOLOGY STUDY: NORMAL
PCP UR QL: NOT DETECTED
PHENTERMINE UR QL: NOT DETECTED
PREGABALIN: NOT DETECTED
SARS-COV-2 RNA RESP QL NAA+PROBE: NOT DETECTED
SERVICE CMNT-IMP: NORMAL
TAPENTADOL UR QL SCN: NOT DETECTED
TAPENTADOL-O-SULF: NOT DETECTED
TEMAZEPAM UR QL: NOT DETECTED
TRAMADOL UR QL: NOT DETECTED
ZOLPIDEM METABOLITE: NOT DETECTED
ZOLPIDEM UR QL: NOT DETECTED

## 2021-06-18 ENCOUNTER — HOSPITAL ENCOUNTER (OUTPATIENT)
Facility: AMBULARY SURGERY CENTER | Age: 61
Discharge: HOME | End: 2021-06-18
Attending: ANESTHESIOLOGY | Admitting: ANESTHESIOLOGY
Payer: MEDICARE

## 2021-06-18 DIAGNOSIS — M47.892 OTHER SPONDYLOSIS, CERVICAL REGION: Primary | ICD-10-CM

## 2021-06-18 PROCEDURE — 64633 DESTROY CERV/THOR FACET JNT: CPT | Mod: RT,,, | Performed by: ANESTHESIOLOGY

## 2021-06-18 PROCEDURE — 64633 PR DESTROY CERV/THOR FACET JNT: ICD-10-PCS | Mod: RT,,, | Performed by: ANESTHESIOLOGY

## 2021-06-18 PROCEDURE — 64634 DESTROY C/TH FACET JNT ADDL: CPT | Performed by: ANESTHESIOLOGY

## 2021-06-18 PROCEDURE — 64634 PR DESTROY C/TH FACET JNT ADDL: ICD-10-PCS | Mod: RT,,, | Performed by: ANESTHESIOLOGY

## 2021-06-18 PROCEDURE — 64633 DESTROY CERV/THOR FACET JNT: CPT | Performed by: ANESTHESIOLOGY

## 2021-06-18 PROCEDURE — 64634 DESTROY C/TH FACET JNT ADDL: CPT | Mod: RT,,, | Performed by: ANESTHESIOLOGY

## 2021-06-18 RX ORDER — LIDOCAINE HYDROCHLORIDE 10 MG/ML
INJECTION, SOLUTION EPIDURAL; INFILTRATION; INTRACAUDAL; PERINEURAL
Status: DISCONTINUED | OUTPATIENT
Start: 2021-06-18 | End: 2021-06-18 | Stop reason: HOSPADM

## 2021-06-18 RX ORDER — MIDAZOLAM HYDROCHLORIDE 2 MG/2ML
INJECTION, SOLUTION INTRAMUSCULAR; INTRAVENOUS
Status: DISCONTINUED | OUTPATIENT
Start: 2021-06-18 | End: 2021-06-18 | Stop reason: HOSPADM

## 2021-06-18 RX ORDER — BUPIVACAINE HYDROCHLORIDE 2.5 MG/ML
INJECTION, SOLUTION EPIDURAL; INFILTRATION; INTRACAUDAL
Status: DISCONTINUED | OUTPATIENT
Start: 2021-06-18 | End: 2021-06-18 | Stop reason: HOSPADM

## 2021-06-18 RX ORDER — LIDOCAINE HYDROCHLORIDE 20 MG/ML
INJECTION, SOLUTION EPIDURAL; INFILTRATION; INTRACAUDAL; PERINEURAL
Status: DISCONTINUED | OUTPATIENT
Start: 2021-06-18 | End: 2021-06-18 | Stop reason: HOSPADM

## 2021-06-18 RX ORDER — METHYLPREDNISOLONE ACETATE 80 MG/ML
INJECTION, SUSPENSION INTRA-ARTICULAR; INTRALESIONAL; INTRAMUSCULAR; SOFT TISSUE
Status: DISCONTINUED | OUTPATIENT
Start: 2021-06-18 | End: 2021-06-18 | Stop reason: HOSPADM

## 2021-06-18 RX ORDER — FENTANYL CITRATE 50 UG/ML
INJECTION, SOLUTION INTRAMUSCULAR; INTRAVENOUS
Status: DISCONTINUED | OUTPATIENT
Start: 2021-06-18 | End: 2021-06-18 | Stop reason: HOSPADM

## 2021-06-18 RX ORDER — SODIUM CHLORIDE, SODIUM LACTATE, POTASSIUM CHLORIDE, CALCIUM CHLORIDE 600; 310; 30; 20 MG/100ML; MG/100ML; MG/100ML; MG/100ML
INJECTION, SOLUTION INTRAVENOUS CONTINUOUS
Status: DISCONTINUED | OUTPATIENT
Start: 2021-06-18 | End: 2021-06-18 | Stop reason: HOSPADM

## 2021-06-18 RX ORDER — PROMETHAZINE HYDROCHLORIDE 25 MG/ML
12.5 INJECTION, SOLUTION INTRAMUSCULAR; INTRAVENOUS
Status: COMPLETED | OUTPATIENT
Start: 2021-06-18 | End: 2021-06-18

## 2021-06-18 RX ORDER — PROMETHAZINE HYDROCHLORIDE 25 MG/ML
INJECTION, SOLUTION INTRAMUSCULAR; INTRAVENOUS
Status: COMPLETED
Start: 2021-06-18 | End: 2021-06-18

## 2021-06-18 RX ADMIN — SODIUM CHLORIDE, SODIUM LACTATE, POTASSIUM CHLORIDE, CALCIUM CHLORIDE: 600; 310; 30; 20 INJECTION, SOLUTION INTRAVENOUS at 12:06

## 2021-06-18 RX ADMIN — PROMETHAZINE HYDROCHLORIDE 12.5 MG: 25 INJECTION, SOLUTION INTRAMUSCULAR; INTRAVENOUS at 12:06

## 2021-06-21 VITALS
BODY MASS INDEX: 23.36 KG/M2 | DIASTOLIC BLOOD PRESSURE: 74 MMHG | RESPIRATION RATE: 17 BRPM | TEMPERATURE: 98 F | HEART RATE: 56 BPM | OXYGEN SATURATION: 97 % | HEIGHT: 60 IN | WEIGHT: 119 LBS | SYSTOLIC BLOOD PRESSURE: 144 MMHG

## 2021-07-30 ENCOUNTER — OFFICE VISIT (OUTPATIENT)
Dept: PAIN MEDICINE | Facility: CLINIC | Age: 61
End: 2021-07-30
Payer: MEDICARE

## 2021-07-30 VITALS
DIASTOLIC BLOOD PRESSURE: 80 MMHG | WEIGHT: 119 LBS | HEIGHT: 61 IN | BODY MASS INDEX: 22.47 KG/M2 | HEART RATE: 61 BPM | SYSTOLIC BLOOD PRESSURE: 134 MMHG

## 2021-07-30 DIAGNOSIS — F11.90 CHRONIC, CONTINUOUS USE OF OPIOIDS: ICD-10-CM

## 2021-07-30 DIAGNOSIS — M47.893 OTHER SPONDYLOSIS, CERVICOTHORACIC REGION: ICD-10-CM

## 2021-07-30 DIAGNOSIS — M96.1 CERVICAL POST-LAMINECTOMY SYNDROME: ICD-10-CM

## 2021-07-30 DIAGNOSIS — M47.892 OTHER SPONDYLOSIS, CERVICAL REGION: ICD-10-CM

## 2021-07-30 DIAGNOSIS — G24.3 CERVICAL DYSTONIA: ICD-10-CM

## 2021-07-30 DIAGNOSIS — G89.4 CHRONIC PAIN DISORDER: Primary | ICD-10-CM

## 2021-07-30 PROCEDURE — 99214 PR OFFICE/OUTPT VISIT, EST, LEVL IV, 30-39 MIN: ICD-10-PCS | Mod: S$GLB,,, | Performed by: PHYSICIAN ASSISTANT

## 2021-07-30 PROCEDURE — 3075F SYST BP GE 130 - 139MM HG: CPT | Mod: CPTII,S$GLB,, | Performed by: PHYSICIAN ASSISTANT

## 2021-07-30 PROCEDURE — 3079F PR MOST RECENT DIASTOLIC BLOOD PRESSURE 80-89 MM HG: ICD-10-PCS | Mod: CPTII,S$GLB,, | Performed by: PHYSICIAN ASSISTANT

## 2021-07-30 PROCEDURE — 99214 OFFICE O/P EST MOD 30 MIN: CPT | Mod: S$GLB,,, | Performed by: PHYSICIAN ASSISTANT

## 2021-07-30 PROCEDURE — 1125F PR PAIN SEVERITY QUANTIFIED, PAIN PRESENT: ICD-10-PCS | Mod: CPTII,S$GLB,, | Performed by: PHYSICIAN ASSISTANT

## 2021-07-30 PROCEDURE — 1125F AMNT PAIN NOTED PAIN PRSNT: CPT | Mod: CPTII,S$GLB,, | Performed by: PHYSICIAN ASSISTANT

## 2021-07-30 PROCEDURE — 3008F PR BODY MASS INDEX (BMI) DOCUMENTED: ICD-10-PCS | Mod: CPTII,S$GLB,, | Performed by: PHYSICIAN ASSISTANT

## 2021-07-30 PROCEDURE — 1159F MED LIST DOCD IN RCRD: CPT | Mod: CPTII,S$GLB,, | Performed by: PHYSICIAN ASSISTANT

## 2021-07-30 PROCEDURE — 1159F PR MEDICATION LIST DOCUMENTED IN MEDICAL RECORD: ICD-10-PCS | Mod: CPTII,S$GLB,, | Performed by: PHYSICIAN ASSISTANT

## 2021-07-30 PROCEDURE — 3079F DIAST BP 80-89 MM HG: CPT | Mod: CPTII,S$GLB,, | Performed by: PHYSICIAN ASSISTANT

## 2021-07-30 PROCEDURE — 1160F RVW MEDS BY RX/DR IN RCRD: CPT | Mod: CPTII,S$GLB,, | Performed by: PHYSICIAN ASSISTANT

## 2021-07-30 PROCEDURE — 99999 PR PBB SHADOW E&M-EST. PATIENT-LVL IV: CPT | Mod: PBBFAC,,, | Performed by: PHYSICIAN ASSISTANT

## 2021-07-30 PROCEDURE — 3075F PR MOST RECENT SYSTOLIC BLOOD PRESS GE 130-139MM HG: ICD-10-PCS | Mod: CPTII,S$GLB,, | Performed by: PHYSICIAN ASSISTANT

## 2021-07-30 PROCEDURE — 1160F PR REVIEW ALL MEDS BY PRESCRIBER/CLIN PHARMACIST DOCUMENTED: ICD-10-PCS | Mod: CPTII,S$GLB,, | Performed by: PHYSICIAN ASSISTANT

## 2021-07-30 PROCEDURE — 99999 PR PBB SHADOW E&M-EST. PATIENT-LVL IV: ICD-10-PCS | Mod: PBBFAC,,, | Performed by: PHYSICIAN ASSISTANT

## 2021-07-30 PROCEDURE — 3008F BODY MASS INDEX DOCD: CPT | Mod: CPTII,S$GLB,, | Performed by: PHYSICIAN ASSISTANT

## 2021-07-30 RX ORDER — HYDROCODONE BITARTRATE AND ACETAMINOPHEN 7.5; 325 MG/1; MG/1
1 TABLET ORAL EVERY 8 HOURS PRN
Qty: 90 TABLET | Refills: 0 | Status: SHIPPED | OUTPATIENT
Start: 2021-08-10 | End: 2021-09-09 | Stop reason: SDUPTHER

## 2021-09-09 ENCOUNTER — OFFICE VISIT (OUTPATIENT)
Dept: PAIN MEDICINE | Facility: CLINIC | Age: 61
End: 2021-09-09
Payer: MEDICARE

## 2021-09-09 VITALS
DIASTOLIC BLOOD PRESSURE: 91 MMHG | WEIGHT: 119 LBS | BODY MASS INDEX: 22.47 KG/M2 | HEIGHT: 61 IN | SYSTOLIC BLOOD PRESSURE: 145 MMHG | HEART RATE: 60 BPM

## 2021-09-09 DIAGNOSIS — G24.3 CERVICAL DYSTONIA: Primary | ICD-10-CM

## 2021-09-09 DIAGNOSIS — M96.1 CERVICAL POST-LAMINECTOMY SYNDROME: ICD-10-CM

## 2021-09-09 DIAGNOSIS — G89.4 CHRONIC PAIN DISORDER: ICD-10-CM

## 2021-09-09 DIAGNOSIS — M47.892 OTHER SPONDYLOSIS, CERVICAL REGION: ICD-10-CM

## 2021-09-09 PROCEDURE — 1159F MED LIST DOCD IN RCRD: CPT | Mod: CPTII,S$GLB,, | Performed by: ANESTHESIOLOGY

## 2021-09-09 PROCEDURE — 3077F SYST BP >= 140 MM HG: CPT | Mod: CPTII,S$GLB,, | Performed by: ANESTHESIOLOGY

## 2021-09-09 PROCEDURE — 64616 PR CHEMODENERVATION NECK MUSCLES EXC LARNYNX, UNI: ICD-10-PCS | Mod: RT,S$GLB,, | Performed by: ANESTHESIOLOGY

## 2021-09-09 PROCEDURE — 3077F PR MOST RECENT SYSTOLIC BLOOD PRESSURE >= 140 MM HG: ICD-10-PCS | Mod: CPTII,S$GLB,, | Performed by: ANESTHESIOLOGY

## 2021-09-09 PROCEDURE — 3008F BODY MASS INDEX DOCD: CPT | Mod: CPTII,S$GLB,, | Performed by: ANESTHESIOLOGY

## 2021-09-09 PROCEDURE — 99214 PR OFFICE/OUTPT VISIT, EST, LEVL IV, 30-39 MIN: ICD-10-PCS | Mod: 25,S$GLB,, | Performed by: ANESTHESIOLOGY

## 2021-09-09 PROCEDURE — 3008F PR BODY MASS INDEX (BMI) DOCUMENTED: ICD-10-PCS | Mod: CPTII,S$GLB,, | Performed by: ANESTHESIOLOGY

## 2021-09-09 PROCEDURE — 99214 OFFICE O/P EST MOD 30 MIN: CPT | Mod: 25,S$GLB,, | Performed by: ANESTHESIOLOGY

## 2021-09-09 PROCEDURE — 1159F PR MEDICATION LIST DOCUMENTED IN MEDICAL RECORD: ICD-10-PCS | Mod: CPTII,S$GLB,, | Performed by: ANESTHESIOLOGY

## 2021-09-09 PROCEDURE — 99999 PR PBB SHADOW E&M-EST. PATIENT-LVL IV: CPT | Mod: PBBFAC,,, | Performed by: ANESTHESIOLOGY

## 2021-09-09 PROCEDURE — 3080F PR MOST RECENT DIASTOLIC BLOOD PRESSURE >= 90 MM HG: ICD-10-PCS | Mod: CPTII,S$GLB,, | Performed by: ANESTHESIOLOGY

## 2021-09-09 PROCEDURE — 3080F DIAST BP >= 90 MM HG: CPT | Mod: CPTII,S$GLB,, | Performed by: ANESTHESIOLOGY

## 2021-09-09 PROCEDURE — 64616 CHEMODENERV MUSC NECK DYSTON: CPT | Mod: RT,S$GLB,, | Performed by: ANESTHESIOLOGY

## 2021-09-09 PROCEDURE — 99999 PR PBB SHADOW E&M-EST. PATIENT-LVL IV: ICD-10-PCS | Mod: PBBFAC,,, | Performed by: ANESTHESIOLOGY

## 2021-09-09 RX ORDER — BUPROPION HYDROCHLORIDE 200 MG/1
TABLET, EXTENDED RELEASE ORAL
COMMUNITY
Start: 2021-05-11

## 2021-09-09 RX ORDER — HYDROCODONE BITARTRATE AND ACETAMINOPHEN 7.5; 325 MG/1; MG/1
1 TABLET ORAL EVERY 8 HOURS PRN
Qty: 90 TABLET | Refills: 0 | Status: SHIPPED | OUTPATIENT
Start: 2021-09-09 | End: 2021-10-08

## 2021-09-09 RX ORDER — HYDROCODONE BITARTRATE AND ACETAMINOPHEN 7.5; 325 MG/1; MG/1
1 TABLET ORAL EVERY 8 HOURS PRN
Qty: 90 TABLET | Refills: 0 | Status: SHIPPED | OUTPATIENT
Start: 2021-10-08 | End: 2021-11-06

## 2021-09-09 RX ORDER — SUCRALFATE 1 G/1
TABLET ORAL
Status: ON HOLD | COMMUNITY
Start: 2021-08-22 | End: 2023-06-28 | Stop reason: HOSPADM

## 2021-09-09 RX ORDER — HYDROCODONE BITARTRATE AND ACETAMINOPHEN 7.5; 325 MG/1; MG/1
1 TABLET ORAL EVERY 8 HOURS PRN
Qty: 90 TABLET | Refills: 0 | Status: SHIPPED | OUTPATIENT
Start: 2021-11-06 | End: 2021-12-02

## 2021-09-09 RX ORDER — FLUTICASONE PROPIONATE 50 MCG
2 SPRAY, SUSPENSION (ML) NASAL
COMMUNITY
Start: 2021-09-08

## 2021-11-09 ENCOUNTER — PATIENT MESSAGE (OUTPATIENT)
Dept: PAIN MEDICINE | Facility: CLINIC | Age: 61
End: 2021-11-09
Payer: MEDICARE

## 2021-12-02 ENCOUNTER — OFFICE VISIT (OUTPATIENT)
Dept: PAIN MEDICINE | Facility: CLINIC | Age: 61
End: 2021-12-02
Payer: MEDICARE

## 2021-12-02 VITALS
BODY MASS INDEX: 22.47 KG/M2 | HEART RATE: 70 BPM | SYSTOLIC BLOOD PRESSURE: 143 MMHG | DIASTOLIC BLOOD PRESSURE: 81 MMHG | HEIGHT: 61 IN | WEIGHT: 119 LBS

## 2021-12-02 DIAGNOSIS — M96.1 CERVICAL POST-LAMINECTOMY SYNDROME: ICD-10-CM

## 2021-12-02 DIAGNOSIS — G89.4 CHRONIC PAIN DISORDER: ICD-10-CM

## 2021-12-02 DIAGNOSIS — G24.3 CERVICAL DYSTONIA: Primary | ICD-10-CM

## 2021-12-02 DIAGNOSIS — M47.892 OTHER SPONDYLOSIS, CERVICAL REGION: ICD-10-CM

## 2021-12-02 PROCEDURE — 64616 CHEMODENERV MUSC NECK DYSTON: CPT | Mod: RT,S$GLB,, | Performed by: ANESTHESIOLOGY

## 2021-12-02 PROCEDURE — 64616 PR CHEMODENERVATION NECK MUSCLES EXC LARNYNX, UNI: ICD-10-PCS | Mod: RT,S$GLB,, | Performed by: ANESTHESIOLOGY

## 2021-12-02 PROCEDURE — 99214 OFFICE O/P EST MOD 30 MIN: CPT | Mod: 25,S$GLB,, | Performed by: ANESTHESIOLOGY

## 2021-12-02 PROCEDURE — 99214 PR OFFICE/OUTPT VISIT, EST, LEVL IV, 30-39 MIN: ICD-10-PCS | Mod: 25,S$GLB,, | Performed by: ANESTHESIOLOGY

## 2021-12-02 PROCEDURE — 99999 PR PBB SHADOW E&M-EST. PATIENT-LVL III: ICD-10-PCS | Mod: PBBFAC,,, | Performed by: ANESTHESIOLOGY

## 2021-12-02 PROCEDURE — 99999 PR PBB SHADOW E&M-EST. PATIENT-LVL III: CPT | Mod: PBBFAC,,, | Performed by: ANESTHESIOLOGY

## 2021-12-02 RX ORDER — HYDROCODONE BITARTRATE AND ACETAMINOPHEN 7.5; 325 MG/1; MG/1
1 TABLET ORAL EVERY 8 HOURS PRN
Qty: 90 TABLET | Refills: 0 | Status: SHIPPED | OUTPATIENT
Start: 2021-12-31 | End: 2022-01-30

## 2021-12-02 RX ORDER — HYDROCODONE BITARTRATE AND ACETAMINOPHEN 7.5; 325 MG/1; MG/1
1 TABLET ORAL EVERY 8 HOURS PRN
Qty: 90 TABLET | Refills: 0 | Status: SHIPPED | OUTPATIENT
Start: 2021-12-02 | End: 2022-01-01

## 2021-12-02 RX ORDER — HYDROCODONE BITARTRATE AND ACETAMINOPHEN 7.5; 325 MG/1; MG/1
1 TABLET ORAL EVERY 8 HOURS PRN
Qty: 90 TABLET | Refills: 0 | Status: SHIPPED | OUTPATIENT
Start: 2022-01-29 | End: 2022-02-28

## 2022-03-10 ENCOUNTER — OFFICE VISIT (OUTPATIENT)
Dept: PAIN MEDICINE | Facility: CLINIC | Age: 62
End: 2022-03-10
Payer: MEDICARE

## 2022-03-10 ENCOUNTER — PATIENT MESSAGE (OUTPATIENT)
Dept: PAIN MEDICINE | Facility: CLINIC | Age: 62
End: 2022-03-10

## 2022-03-10 VITALS
HEIGHT: 61 IN | HEART RATE: 62 BPM | DIASTOLIC BLOOD PRESSURE: 78 MMHG | SYSTOLIC BLOOD PRESSURE: 129 MMHG | WEIGHT: 119 LBS | BODY MASS INDEX: 22.47 KG/M2

## 2022-03-10 DIAGNOSIS — M47.892 OTHER SPONDYLOSIS, CERVICAL REGION: ICD-10-CM

## 2022-03-10 DIAGNOSIS — M96.1 CERVICAL POST-LAMINECTOMY SYNDROME: ICD-10-CM

## 2022-03-10 DIAGNOSIS — G89.4 CHRONIC PAIN DISORDER: ICD-10-CM

## 2022-03-10 DIAGNOSIS — G24.3 CERVICAL DYSTONIA: Primary | ICD-10-CM

## 2022-03-10 PROCEDURE — 3008F BODY MASS INDEX DOCD: CPT | Mod: CPTII,S$GLB,, | Performed by: ANESTHESIOLOGY

## 2022-03-10 PROCEDURE — 1159F PR MEDICATION LIST DOCUMENTED IN MEDICAL RECORD: ICD-10-PCS | Mod: CPTII,S$GLB,, | Performed by: ANESTHESIOLOGY

## 2022-03-10 PROCEDURE — 99999 PR PBB SHADOW E&M-EST. PATIENT-LVL III: CPT | Mod: PBBFAC,,, | Performed by: ANESTHESIOLOGY

## 2022-03-10 PROCEDURE — 3078F DIAST BP <80 MM HG: CPT | Mod: CPTII,S$GLB,, | Performed by: ANESTHESIOLOGY

## 2022-03-10 PROCEDURE — 99999 PR PBB SHADOW E&M-EST. PATIENT-LVL III: ICD-10-PCS | Mod: PBBFAC,,, | Performed by: ANESTHESIOLOGY

## 2022-03-10 PROCEDURE — 3074F PR MOST RECENT SYSTOLIC BLOOD PRESSURE < 130 MM HG: ICD-10-PCS | Mod: CPTII,S$GLB,, | Performed by: ANESTHESIOLOGY

## 2022-03-10 PROCEDURE — 99214 OFFICE O/P EST MOD 30 MIN: CPT | Mod: S$GLB,,, | Performed by: ANESTHESIOLOGY

## 2022-03-10 PROCEDURE — 99214 PR OFFICE/OUTPT VISIT, EST, LEVL IV, 30-39 MIN: ICD-10-PCS | Mod: S$GLB,,, | Performed by: ANESTHESIOLOGY

## 2022-03-10 PROCEDURE — 3078F PR MOST RECENT DIASTOLIC BLOOD PRESSURE < 80 MM HG: ICD-10-PCS | Mod: CPTII,S$GLB,, | Performed by: ANESTHESIOLOGY

## 2022-03-10 PROCEDURE — 3008F PR BODY MASS INDEX (BMI) DOCUMENTED: ICD-10-PCS | Mod: CPTII,S$GLB,, | Performed by: ANESTHESIOLOGY

## 2022-03-10 PROCEDURE — 3074F SYST BP LT 130 MM HG: CPT | Mod: CPTII,S$GLB,, | Performed by: ANESTHESIOLOGY

## 2022-03-10 PROCEDURE — 1159F MED LIST DOCD IN RCRD: CPT | Mod: CPTII,S$GLB,, | Performed by: ANESTHESIOLOGY

## 2022-03-10 RX ORDER — HYDROCODONE BITARTRATE AND ACETAMINOPHEN 7.5; 325 MG/1; MG/1
1 TABLET ORAL EVERY 8 HOURS PRN
Qty: 90 TABLET | Refills: 0 | Status: SHIPPED | OUTPATIENT
Start: 2022-03-10 | End: 2022-04-09

## 2022-03-10 RX ORDER — HYDROCODONE BITARTRATE AND ACETAMINOPHEN 7.5; 325 MG/1; MG/1
1 TABLET ORAL EVERY 8 HOURS PRN
Qty: 90 TABLET | Refills: 0 | Status: SHIPPED | OUTPATIENT
Start: 2022-04-08 | End: 2022-05-08

## 2022-03-10 RX ORDER — HYDROCODONE BITARTRATE AND ACETAMINOPHEN 7.5; 325 MG/1; MG/1
1 TABLET ORAL EVERY 8 HOURS PRN
Qty: 90 TABLET | Refills: 0 | Status: SHIPPED | OUTPATIENT
Start: 2022-05-07 | End: 2022-05-27 | Stop reason: SDUPTHER

## 2022-03-10 NOTE — PROGRESS NOTES
Referring Physician: Levi Robert MD    PCP: Fabián Darnell Jr, MD      CC:neck and right shoulder pain    Interval history:  Keshia Garza is a 62 y.o. female with chronic neck pain who returns our clinic.  She has difficult history of extensive cervical spine surgery.  Right-sided cervical MB RFA procedure back in June 2021. Initially, she reported minimal benefit, however, she now states pain is much improved .  Neck pain is improved with increased range of motion. She does have contractures of the neck due to previous surgery.  She is s/p Botox injection treatments for cervical dystonia.  She does state having moderate benefit from the Botox.  She wishes to continue botox injection treatments.    She wishes to resume Norco 7.5 mg q.8 hours as needed.  No side effects reported.  She denies any worsening weakness.  No bowel bladder changes.   Prior HPI:   Keshia Garza is a 62 y.o. female  referred to us for neck and right shoulder pain.  Patient with extensive history of cervical spine surgery.  She is s/p C4-T4 posterior cervical fusion on 2/1/2016 with subsequent hardware revision on 5/9/2016.  She states this help with her neck and radiating arm pain.  She had onset worsening neck and right shoulder pain over the past year.  No recent traumatic incident.  She has been evaluated by Neurosurgery.  She underwent updated cervical MRI and cervical CT.  She also had EMG study.  Pain is a constant aching, throbbing pain in her neck.  Pain radiates to her right shoulder.  She has numbness tingling down her bilateral arms.  She denies any worsening weakness.  No bowel bladder changes.  She takes NSAIDs with mild benefits.  She currently takes Lyrica with mild benefits as well.  She underwent a cervical LINDA prior to her surgery with minimal benefit.    Interventional History:  S/p cervical MB RFA on right at C4, 5, 6, 7 on 06/2021 with 50% relief    ROS:  CONSTITUTIONAL: No fevers, chills, night sweats, wt. loss,  "appetite changes  SKIN: no rashes or itching  ENT: No headaches, head trauma, vision changes, or eye pain  LYMPH NODES: None noticed   CV: No chest pain, palpitations.   RESP: No shortness of breath, dyspnea on exertion, cough, wheezing, or hemoptysis  GI: No nausea, emesis, diarrhea, constipation, melena, hematochezia, pain.    : No dysuria, hematuria, urgency, or frequency   HEME: No easy bruising, bleeding problems  PSYCHIATRIC: No depression, anxiety, psychosis, hallucinations.  NEURO: No seizures, memory loss, dizziness or difficulty sleeping  MSK:  Positive HPI      Past Medical History:   Diagnosis Date    Benitez esophagus     Bowel obstruction     2013 and had part of small bowel removal from adhesions    Depression     Difficult intravenous access 6/23/2015    Has medardo cath    Encounter for blood transfusion     GERD (gastroesophageal reflux disease)     H. pylori infection     Treated    Hormone replacement therapy (HRT) 6/23/2015    HTN (hypertension) 6/23/2015    Hypertension     Insomnia     Megaloblastic anemia     Pancreatitis     Portacath in place     PUD (peptic ulcer disease)     bleeding ulcer-2012    Stroke     Thyroid disease     Weight loss 6/23/2015     Past Surgical History:   Procedure Laterality Date    ABDOMINAL ADHESION SURGERY      anterior cervical spine fusion      C5-C7 "years ago" (per report in 2015)    Anterior cervical spine surgery  7/9/2015    BACK SURGERY      CHOLEDOCHODUODENOSTOMY      COLONOSCOPY      ERCP      HYSTERECTOMY      INJECTION OF ANESTHETIC AGENT AROUND MEDIAL BRANCH NERVES INNERVATING CERVICAL FACET JOINT Right 1/28/2021    Procedure: Block-nerve-medial branch-cervical;  Surgeon: Levi Robert MD;  Location[facility];  Service: Pain Management;  Laterality: Right;  C4, 5, 6, 7     INJECTION OF ANESTHETIC AGENT AROUND MEDIAL BRANCH NERVES INNERVATING CERVICAL FACET JOINT Right 2/2/2021    Procedure: Block-nerve-medial " "branch-cervical;  Surgeon: Levi Robert MD;  Location: [facility];  Service: Pain Management;  Laterality: Right;  C4, 5, 6, 7     LIVER BIOPSY      NISSEN FUNDOPLICATION      oral implant      medardo cath      RADIOFREQUENCY ABLATION OF LUMBAR MEDIAL BRANCH NERVE AT SINGLE LEVEL Right 6/18/2021    Procedure: RADIOFREQUENCY ABLATION, NERVE, SPINAL, Cervical;  Surgeon: Levi Robert MD;  Location: [facility];  Service: Pain Management;  Laterality: Right;  C4,5,6,7    SMALL INTESTINE SURGERY      SMALL INTESTINE SURGERY      UPPER GASTROINTESTINAL ENDOSCOPY       Family History   Problem Relation Age of Onset    Cancer Maternal Grandmother 50        colon     Social History     Socioeconomic History    Marital status:    Tobacco Use    Smoking status: Never Smoker    Smokeless tobacco: Never Used   Substance and Sexual Activity    Alcohol use: Yes     Comment: oocasional    Drug use: No    Sexual activity: Never         Medications/Allergies: See med card    Vitals:    03/10/22 1055   BP: 129/78   Pulse: 62   Weight: 54 kg (119 lb)   Height: 5' 1" (1.549 m)   PainSc:   7   PainLoc: Neck         Physical exam:    GENERAL: A and O x3, the patient appears well groomed and is in no acute distress.  Skin: No rashes or obvious lesions  HEENT: normocephalic, atraumatic  CARDIOVASCULAR:  RRR  LUNGS: non labored breathing  ABDOMEN: soft, nontender   UPPER EXTREMITIES: Normal alignment, normal range of motion, no atrophy, no skin changes,  hair growth and nail growth normal and equal bilaterally. No swelling, no tenderness.    LOWER EXTREMITIES:  Normal alignment, normal range of motion, no atrophy, no skin changes,  hair growth and nail growth normal and equal bilaterally. No swelling, no tenderness.  CERVICAL SPINE:  Cervical spine: ROM is very limited flexion, extension and lateral rotation with moderate increased pain.  There is contracture of the neck due to previous surgery  Spurling's maneuver causes neck " pain to right side.  Myofascial exam: moderate Tenderness to palpation across cervical paraspinous region on right  MENTAL STATUS: normal orientation, speech, language, and fund of knowledge for social situation.  Emotional state appropriate.    CRANIAL NERVES:  II:  PERRL bilaterally,   III,IV,VI: EOMI.    V:  Facial sensation equal bilaterally  VII:  Facial motor function normal.  VIII:  Hearing equal to finger rub bilaterally  IX/X: Gag normal, palate symmetric  XI:  Shoulder shrug equal, head turn equal  XII:  Tongue midline without fasciculations      MOTOR: Tone and bulk: normal bilateral upper and lower Strength: normal   Delt Bi Tri WE WF     R 5 5 5 5 5 5   L 5 5 5 5 5 5     IP ADD ABD Quad TA Gas HAM  R 5 5 5 5 5 5 5  L 5 5 5 5 5 5 5    SENSATION: Light touch and pinprick intact bilaterally  REFLEXES: normal, symmetric, nonbrisk.  Toes down, no clonus. No hoffmans.  GAIT: normal rise, base, steps, and arm swing.        Imaging:  MRI C-spine 1/2021  Impression:  1. Stable postsurgical changes of anterior cervical fusion at C3-C7 and posterior instrumented fusion at C4-T4, as described above, without evidence of acute hardware complication.  2. Multilevel degenerative changes of the cervical spine, as described in detail above, not significantly changed in comparison to the prior exam on 10/24/2018    CT C-spine 1/2021  C2-C3: Left asymmetric posterior disc osteophyte complex.  Mild left facet arthropathy.  Mild left neural foraminal stenosis.  There is no spinal canal stenosis.     C3-C4: Moderate left and mild right facet arthropathy.  Mild bilateral uncovertebral joint spurring.  Moderate left and mild-to-moderate right neural foraminal stenosis.  There is no spinal canal stenosis.     C4-C5: Mild posterior osteophyte complex.  Moderate bilateral facet arthropathy.  Mild bilateral uncovertebral spurring.  Mild left neural foraminal stenosis.  There is no spinal canal stenosis.     C5-C6: Mild  bilateral facet arthropathy.  Mild right uncovertebral joint spurring.  Mild right neural foraminal stenosis.  There is no spinal canal stenosis.     C6-C7: Left asymmetric posterior osteophyte complex.  Left facet arthropathy.  Moderate left uncovertebral spurring.  Mild left neural foraminal stenosis.  There is no spinal canal stenosis.     C7-T1: Bilateral uncovertebral spurring and prominent facet arthropathy.  Moderate to severe bilateral neural foraminal stenosis.  There is no spinal canal stenosis.     T1-2: Bilateral uncovertebral spurring and prominent facet arthropathy.  Severe left and moderate right neural foraminal stenosis.     T2-3:.  No significant neural foraminal or spinal canal stenosis.      Assessment:    Keshia Garza is a 62 y.o. female with neck and right shoulder pain  1. Cervical dystonia    2. Cervical post-laminectomy syndrome    3. Other spondylosis, cervical region    4. Chronic pain disorder        Plan:  1. I have stressed the importance of physical activity and exercise to improve overall health  2. Reviewed pertinent imaging and records with patient.  3.  She does have a component of cervical dystonia with improvement with botox. Plan for repeat botox injections next visit  4.  Continue Hydrocodone 7.5/325 mg q 8 h #90. UDS next visit.  Script given for 3 months  5.  Patient with significant benefit following Cervical MB RFA.  Patient will continue to monitor her progress.  May consider repeat procedure in future if pain returns or worsens.   6. Follow up in 3 months for repeat botox

## 2022-05-23 ENCOUNTER — PATIENT MESSAGE (OUTPATIENT)
Dept: PAIN MEDICINE | Facility: CLINIC | Age: 62
End: 2022-05-23
Payer: MEDICARE

## 2022-05-23 ENCOUNTER — PATIENT MESSAGE (OUTPATIENT)
Dept: ADMINISTRATIVE | Facility: OTHER | Age: 62
End: 2022-05-23
Payer: MEDICARE

## 2022-05-23 NOTE — TELEPHONE ENCOUNTER
"Dr Robert pt states she did not have Botox in March she has upcoming appt in 06/09 but wants before then. If I can accommodate before that date can I move her up"?  "

## 2022-05-27 ENCOUNTER — OFFICE VISIT (OUTPATIENT)
Dept: PAIN MEDICINE | Facility: CLINIC | Age: 62
End: 2022-05-27
Payer: MEDICARE

## 2022-05-27 VITALS
SYSTOLIC BLOOD PRESSURE: 129 MMHG | BODY MASS INDEX: 22.47 KG/M2 | DIASTOLIC BLOOD PRESSURE: 87 MMHG | WEIGHT: 119 LBS | HEART RATE: 68 BPM | HEIGHT: 61 IN

## 2022-05-27 DIAGNOSIS — G24.3 CERVICAL DYSTONIA: ICD-10-CM

## 2022-05-27 DIAGNOSIS — F11.90 CHRONIC, CONTINUOUS USE OF OPIOIDS: Primary | ICD-10-CM

## 2022-05-27 DIAGNOSIS — G89.4 CHRONIC PAIN DISORDER: ICD-10-CM

## 2022-05-27 DIAGNOSIS — M96.1 CERVICAL POST-LAMINECTOMY SYNDROME: ICD-10-CM

## 2022-05-27 DIAGNOSIS — M47.892 OTHER SPONDYLOSIS, CERVICAL REGION: ICD-10-CM

## 2022-05-27 PROCEDURE — 80307 DRUG TEST PRSMV CHEM ANLYZR: CPT | Performed by: ANESTHESIOLOGY

## 2022-05-27 PROCEDURE — 3008F BODY MASS INDEX DOCD: CPT | Mod: CPTII,S$GLB,, | Performed by: ANESTHESIOLOGY

## 2022-05-27 PROCEDURE — 1159F MED LIST DOCD IN RCRD: CPT | Mod: CPTII,S$GLB,, | Performed by: ANESTHESIOLOGY

## 2022-05-27 PROCEDURE — 64616 CHEMODENERV MUSC NECK DYSTON: CPT | Mod: RT,S$GLB,, | Performed by: ANESTHESIOLOGY

## 2022-05-27 PROCEDURE — 99999 PR PBB SHADOW E&M-EST. PATIENT-LVL IV: ICD-10-PCS | Mod: PBBFAC,,, | Performed by: ANESTHESIOLOGY

## 2022-05-27 PROCEDURE — 99214 PR OFFICE/OUTPT VISIT, EST, LEVL IV, 30-39 MIN: ICD-10-PCS | Mod: 25,S$GLB,, | Performed by: ANESTHESIOLOGY

## 2022-05-27 PROCEDURE — 64616 PR CHEMODENERVATION NECK MUSCLES EXC LARNYNX, UNI: ICD-10-PCS | Mod: RT,S$GLB,, | Performed by: ANESTHESIOLOGY

## 2022-05-27 PROCEDURE — 3008F PR BODY MASS INDEX (BMI) DOCUMENTED: ICD-10-PCS | Mod: CPTII,S$GLB,, | Performed by: ANESTHESIOLOGY

## 2022-05-27 PROCEDURE — 3074F SYST BP LT 130 MM HG: CPT | Mod: CPTII,S$GLB,, | Performed by: ANESTHESIOLOGY

## 2022-05-27 PROCEDURE — 1159F PR MEDICATION LIST DOCUMENTED IN MEDICAL RECORD: ICD-10-PCS | Mod: CPTII,S$GLB,, | Performed by: ANESTHESIOLOGY

## 2022-05-27 PROCEDURE — 3079F DIAST BP 80-89 MM HG: CPT | Mod: CPTII,S$GLB,, | Performed by: ANESTHESIOLOGY

## 2022-05-27 PROCEDURE — 99214 OFFICE O/P EST MOD 30 MIN: CPT | Mod: 25,S$GLB,, | Performed by: ANESTHESIOLOGY

## 2022-05-27 PROCEDURE — 99999 PR PBB SHADOW E&M-EST. PATIENT-LVL IV: CPT | Mod: PBBFAC,,, | Performed by: ANESTHESIOLOGY

## 2022-05-27 PROCEDURE — 3074F PR MOST RECENT SYSTOLIC BLOOD PRESSURE < 130 MM HG: ICD-10-PCS | Mod: CPTII,S$GLB,, | Performed by: ANESTHESIOLOGY

## 2022-05-27 PROCEDURE — 3079F PR MOST RECENT DIASTOLIC BLOOD PRESSURE 80-89 MM HG: ICD-10-PCS | Mod: CPTII,S$GLB,, | Performed by: ANESTHESIOLOGY

## 2022-05-27 RX ORDER — HYDROCODONE BITARTRATE AND ACETAMINOPHEN 7.5; 325 MG/1; MG/1
1 TABLET ORAL EVERY 8 HOURS PRN
Qty: 90 TABLET | Refills: 0 | Status: SHIPPED | OUTPATIENT
Start: 2022-06-06 | End: 2022-07-06

## 2022-05-27 RX ORDER — HYDROCODONE BITARTRATE AND ACETAMINOPHEN 7.5; 325 MG/1; MG/1
1 TABLET ORAL EVERY 8 HOURS PRN
Qty: 90 TABLET | Refills: 0 | Status: SHIPPED | OUTPATIENT
Start: 2022-08-03 | End: 2022-09-01

## 2022-05-27 RX ORDER — HYDROCODONE BITARTRATE AND ACETAMINOPHEN 7.5; 325 MG/1; MG/1
1 TABLET ORAL EVERY 8 HOURS PRN
Qty: 90 TABLET | Refills: 0 | Status: SHIPPED | OUTPATIENT
Start: 2022-07-05 | End: 2022-08-04

## 2022-05-27 NOTE — PROGRESS NOTES
Referring Physician: Levi Robert MD    PCP: Fabián Darnell Jr, MD      CC:neck and right shoulder pain    Interval history:  Keshia Garza is a 62 y.o. female with chronic neck pain who returns our clinic.  She has difficult history of extensive cervical spine surgery.  Right-sided cervical MB RFA procedure back in June 2021. Initially, she reported minimal benefit, however, she now states pain is much improved .  Neck pain is improved with increased range of motion.  She is consider repeat in near future. She does have contractures of the neck due to previous surgery.  She is s/p Botox injection treatments for cervical dystonia.  She does state having moderate benefit from the Botox.   She wishes to resume Norco 7.5 mg q.8 hours as needed.  No side effects reported.  She denies any worsening weakness.  No bowel bladder changes.    Prior HPI:   Keshia Garza is a 62 y.o. female  referred to us for neck and right shoulder pain.  Patient with extensive history of cervical spine surgery.  She is s/p C4-T4 posterior cervical fusion on 2/1/2016 with subsequent hardware revision on 5/9/2016.  She states this help with her neck and radiating arm pain.  She had onset worsening neck and right shoulder pain over the past year.  No recent traumatic incident.  She has been evaluated by Neurosurgery.  She underwent updated cervical MRI and cervical CT.  She also had EMG study.  Pain is a constant aching, throbbing pain in her neck.  Pain radiates to her right shoulder.  She has numbness tingling down her bilateral arms.  She denies any worsening weakness.  No bowel bladder changes.  She takes NSAIDs with mild benefits.  She currently takes Lyrica with mild benefits as well.  She underwent a cervical LINDA prior to her surgery with minimal benefit.    Interventional History:  S/p cervical MB RFA on right at C4, 5, 6, 7 on 06/2021 with 50% relief    ROS:  CONSTITUTIONAL: No fevers, chills, night sweats, wt. loss, appetite  "changes  SKIN: no rashes or itching  ENT: No headaches, head trauma, vision changes, or eye pain  LYMPH NODES: None noticed   CV: No chest pain, palpitations.   RESP: No shortness of breath, dyspnea on exertion, cough, wheezing, or hemoptysis  GI: No nausea, emesis, diarrhea, constipation, melena, hematochezia, pain.    : No dysuria, hematuria, urgency, or frequency   HEME: No easy bruising, bleeding problems  PSYCHIATRIC: No depression, anxiety, psychosis, hallucinations.  NEURO: No seizures, memory loss, dizziness or difficulty sleeping  MSK:  Positive HPI      Past Medical History:   Diagnosis Date    Benitez esophagus     Bowel obstruction     2013 and had part of small bowel removal from adhesions    Depression     Difficult intravenous access 6/23/2015    Has medardo cath    Encounter for blood transfusion     GERD (gastroesophageal reflux disease)     H. pylori infection     Treated    Hormone replacement therapy (HRT) 6/23/2015    HTN (hypertension) 6/23/2015    Hypertension     Insomnia     Megaloblastic anemia     Pancreatitis     Portacath in place     PUD (peptic ulcer disease)     bleeding ulcer-2012    Stroke     Thyroid disease     Weight loss 6/23/2015     Past Surgical History:   Procedure Laterality Date    ABDOMINAL ADHESION SURGERY      anterior cervical spine fusion      C5-C7 "years ago" (per report in 2015)    Anterior cervical spine surgery  7/9/2015    BACK SURGERY      CHOLEDOCHODUODENOSTOMY      COLONOSCOPY      ERCP      HYSTERECTOMY      INJECTION OF ANESTHETIC AGENT AROUND MEDIAL BRANCH NERVES INNERVATING CERVICAL FACET JOINT Right 1/28/2021    Procedure: Block-nerve-medial branch-cervical;  Surgeon: Levi Robert MD;  Location: [facility] OR;  Service: Pain Management;  Laterality: Right;  C4, 5, 6, 7     INJECTION OF ANESTHETIC AGENT AROUND MEDIAL BRANCH NERVES INNERVATING CERVICAL FACET JOINT Right 2/2/2021    Procedure: Block-nerve-medial branch-cervical;  " "Surgeon: Levi Robert MD;  Location: [facility] OR;  Service: Pain Management;  Laterality: Right;  C4, 5, 6, 7     LIVER BIOPSY      NISSEN FUNDOPLICATION      oral implant      medardo cath      RADIOFREQUENCY ABLATION OF LUMBAR MEDIAL BRANCH NERVE AT SINGLE LEVEL Right 6/18/2021    Procedure: RADIOFREQUENCY ABLATION, NERVE, SPINAL, Cervical;  Surgeon: Levi Robert MD;  Location: [facility] OR;  Service: Pain Management;  Laterality: Right;  C4,5,6,7    SMALL INTESTINE SURGERY      SMALL INTESTINE SURGERY      UPPER GASTROINTESTINAL ENDOSCOPY       Family History   Problem Relation Age of Onset    Cancer Maternal Grandmother 50        colon     Social History     Socioeconomic History    Marital status:    Tobacco Use    Smoking status: Never Smoker    Smokeless tobacco: Never Used   Substance and Sexual Activity    Alcohol use: Yes     Comment: oocasional    Drug use: No    Sexual activity: Never         Medications/Allergies: See med card    Vitals:    05/27/22 1015   BP: 129/87   Pulse: 68   Weight: 54 kg (119 lb)   Height: 5' 1" (1.549 m)   PainSc: 10-Worst pain ever   PainLoc: Neck         Physical exam:    GENERAL: A and O x3, the patient appears well groomed and is in no acute distress.  Skin: No rashes or obvious lesions  HEENT: normocephalic, atraumatic  CARDIOVASCULAR:  RRR  LUNGS: non labored breathing  ABDOMEN: soft, nontender   UPPER EXTREMITIES: Normal alignment, normal range of motion, no atrophy, no skin changes,  hair growth and nail growth normal and equal bilaterally. No swelling, no tenderness.    LOWER EXTREMITIES:  Normal alignment, normal range of motion, no atrophy, no skin changes,  hair growth and nail growth normal and equal bilaterally. No swelling, no tenderness.  CERVICAL SPINE:  Cervical spine: ROM is very limited flexion, extension and lateral rotation with moderate increased pain.  There is contracture of the neck due to previous surgery  Spurling's maneuver causes neck pain " to right side.  Myofascial exam: moderate Tenderness to palpation across cervical paraspinous region on right  MENTAL STATUS: normal orientation, speech, language, and fund of knowledge for social situation.  Emotional state appropriate.    CRANIAL NERVES:  II:  PERRL bilaterally,   III,IV,VI: EOMI.    V:  Facial sensation equal bilaterally  VII:  Facial motor function normal.  VIII:  Hearing equal to finger rub bilaterally  IX/X: Gag normal, palate symmetric  XI:  Shoulder shrug equal, head turn equal  XII:  Tongue midline without fasciculations      MOTOR: Tone and bulk: normal bilateral upper and lower Strength: normal   Delt Bi Tri WE WF     R 5 5 5 5 5 5   L 5 5 5 5 5 5     IP ADD ABD Quad TA Gas HAM  R 5 5 5 5 5 5 5  L 5 5 5 5 5 5 5    SENSATION: Light touch and pinprick intact bilaterally  REFLEXES: normal, symmetric, nonbrisk.  Toes down, no clonus. No hoffmans.  GAIT: normal rise, base, steps, and arm swing.        Imaging:  MRI C-spine 1/2021  Impression:  1. Stable postsurgical changes of anterior cervical fusion at C3-C7 and posterior instrumented fusion at C4-T4, as described above, without evidence of acute hardware complication.  2. Multilevel degenerative changes of the cervical spine, as described in detail above, not significantly changed in comparison to the prior exam on 10/24/2018    CT C-spine 1/2021  C2-C3: Left asymmetric posterior disc osteophyte complex.  Mild left facet arthropathy.  Mild left neural foraminal stenosis.  There is no spinal canal stenosis.     C3-C4: Moderate left and mild right facet arthropathy.  Mild bilateral uncovertebral joint spurring.  Moderate left and mild-to-moderate right neural foraminal stenosis.  There is no spinal canal stenosis.     C4-C5: Mild posterior osteophyte complex.  Moderate bilateral facet arthropathy.  Mild bilateral uncovertebral spurring.  Mild left neural foraminal stenosis.  There is no spinal canal stenosis.     C5-C6: Mild bilateral  facet arthropathy.  Mild right uncovertebral joint spurring.  Mild right neural foraminal stenosis.  There is no spinal canal stenosis.     C6-C7: Left asymmetric posterior osteophyte complex.  Left facet arthropathy.  Moderate left uncovertebral spurring.  Mild left neural foraminal stenosis.  There is no spinal canal stenosis.     C7-T1: Bilateral uncovertebral spurring and prominent facet arthropathy.  Moderate to severe bilateral neural foraminal stenosis.  There is no spinal canal stenosis.     T1-2: Bilateral uncovertebral spurring and prominent facet arthropathy.  Severe left and moderate right neural foraminal stenosis.     T2-3:.  No significant neural foraminal or spinal canal stenosis.      Assessment:    Keshia Garza is a 62 y.o. female with neck and right shoulder pain  1. Chronic, continuous use of opioids    2. Cervical dystonia    3. Cervical post-laminectomy syndrome    4. Other spondylosis, cervical region    5. Chronic pain disorder        Plan:  1. I have stressed the importance of physical activity and exercise to improve overall health  2. Reviewed pertinent imaging and records with patient.  3.  She does have a component of cervical dystonia with improvement with botox. Perform repeat botox injections today  4.  Continue Hydrocodone 7.5/325 mg q 8 h #90. UDS today.  Script given for 3 months  5.  Patient with significant benefit following Cervical MB RFA.  Patient will continue to monitor her progress.  May consider repeat procedure in future if pain returns or worsens.   6. Follow up in 3 months for repeat botox    PROCEDURE NOTE:    Botox injections for cervical dystonia     Timeout performed prior to procedure.  A consent was signed after reviewing the risks of the injection with Botox and the patient decided to proceed. Forehead, occipital region, cervical region and temporal regions were cleaned with ChloraPrep. 200 units of Botox was drawn up with preservative free saline as the  solution, with 5 units of Botox per 0.1 milliliter. Muscles injected using 0.5inch 30g needle.  One hundred units were injected over the right cervical paraspinal muscle.  100 units was injected over the right trapezius.  Total units used was approximately 200units of Botox. The patient tolerated the procedure well, was observed for 15 minutes after the procedure and was discharged home in stable condition

## 2022-06-03 LAB
6MAM UR QL: NOT DETECTED
7AMINOCLONAZEPAM UR QL: NOT DETECTED
A-OH ALPRAZ UR QL: NOT DETECTED
ALPHA-OH-MIDAZOLAM: NOT DETECTED
ALPRAZ UR QL: NOT DETECTED
AMPHET UR QL SCN: NOT DETECTED
ANNOTATION COMMENT IMP: NORMAL
ANNOTATION COMMENT IMP: NORMAL
BARBITURATES UR QL: NOT DETECTED
BUPRENORPHINE UR QL: NOT DETECTED
BZE UR QL: NOT DETECTED
CARBOXYTHC UR QL: NOT DETECTED
CARISOPRODOL UR QL: NOT DETECTED
CLONAZEPAM UR QL: NOT DETECTED
CODEINE UR QL: NOT DETECTED
CREAT UR-MCNC: 51.5 MG/DL (ref 20–400)
DIAZEPAM UR QL: NOT DETECTED
ETHYL GLUCURONIDE UR QL: NOT DETECTED
FENTANYL UR QL: NOT DETECTED
GABAPENTIN: NOT DETECTED
HYDROCODONE UR QL: PRESENT
HYDROMORPHONE UR QL: PRESENT
LORAZEPAM UR QL: NOT DETECTED
MDA UR QL: NOT DETECTED
MDEA UR QL: NOT DETECTED
MDMA UR QL: NOT DETECTED
ME-PHENIDATE UR QL: NOT DETECTED
METHADONE UR QL: NOT DETECTED
METHAMPHET UR QL: NOT DETECTED
MIDAZOLAM UR QL SCN: NOT DETECTED
MORPHINE UR QL: NOT DETECTED
NALOXONE: NOT DETECTED
NORBUPRENORPHINE UR QL CFM: NOT DETECTED
NORDIAZEPAM UR QL: NOT DETECTED
NORFENTANYL UR QL: NOT DETECTED
NORHYDROCODONE UR QL CFM: PRESENT
NORMEPERIDINE UR QL CFM: NOT DETECTED
NOROXYCODONE UR QL CFM: NOT DETECTED
NOROXYMORPHONE UR QL SCN: NOT DETECTED
OXAZEPAM UR QL: NOT DETECTED
OXYCODONE UR QL: NOT DETECTED
OXYMORPHONE UR QL: NOT DETECTED
PATHOLOGY STUDY: NORMAL
PCP UR QL: NOT DETECTED
PHENTERMINE UR QL: NOT DETECTED
PREGABALIN: NOT DETECTED
SERVICE CMNT-IMP: NORMAL
TAPENTADOL UR QL SCN: NOT DETECTED
TAPENTADOL UR QL SCN: NOT DETECTED
TEMAZEPAM UR QL: NOT DETECTED
TRAMADOL UR QL: NOT DETECTED
ZOLPIDEM METABOLITE: NOT DETECTED
ZOLPIDEM UR QL: NOT DETECTED

## 2022-09-01 ENCOUNTER — OFFICE VISIT (OUTPATIENT)
Dept: PAIN MEDICINE | Facility: CLINIC | Age: 62
End: 2022-09-01
Payer: MEDICARE

## 2022-09-01 VITALS
WEIGHT: 119 LBS | BODY MASS INDEX: 22.47 KG/M2 | HEART RATE: 70 BPM | DIASTOLIC BLOOD PRESSURE: 78 MMHG | HEIGHT: 61 IN | SYSTOLIC BLOOD PRESSURE: 134 MMHG

## 2022-09-01 DIAGNOSIS — M96.1 CERVICAL POST-LAMINECTOMY SYNDROME: ICD-10-CM

## 2022-09-01 DIAGNOSIS — G89.4 CHRONIC PAIN DISORDER: ICD-10-CM

## 2022-09-01 DIAGNOSIS — G24.3 CERVICAL DYSTONIA: Primary | ICD-10-CM

## 2022-09-01 DIAGNOSIS — M47.892 OTHER SPONDYLOSIS, CERVICAL REGION: ICD-10-CM

## 2022-09-01 PROCEDURE — 3075F SYST BP GE 130 - 139MM HG: CPT | Mod: CPTII,S$GLB,, | Performed by: ANESTHESIOLOGY

## 2022-09-01 PROCEDURE — 64616 PR CHEMODENERVATION NECK MUSCLES EXC LARNYNX, UNI: ICD-10-PCS | Mod: RT,S$GLB,, | Performed by: ANESTHESIOLOGY

## 2022-09-01 PROCEDURE — 99214 OFFICE O/P EST MOD 30 MIN: CPT | Mod: 25,S$GLB,, | Performed by: ANESTHESIOLOGY

## 2022-09-01 PROCEDURE — 1159F PR MEDICATION LIST DOCUMENTED IN MEDICAL RECORD: ICD-10-PCS | Mod: CPTII,S$GLB,, | Performed by: ANESTHESIOLOGY

## 2022-09-01 PROCEDURE — 3078F PR MOST RECENT DIASTOLIC BLOOD PRESSURE < 80 MM HG: ICD-10-PCS | Mod: CPTII,S$GLB,, | Performed by: ANESTHESIOLOGY

## 2022-09-01 PROCEDURE — 1159F MED LIST DOCD IN RCRD: CPT | Mod: CPTII,S$GLB,, | Performed by: ANESTHESIOLOGY

## 2022-09-01 PROCEDURE — 3078F DIAST BP <80 MM HG: CPT | Mod: CPTII,S$GLB,, | Performed by: ANESTHESIOLOGY

## 2022-09-01 PROCEDURE — 99999 PR PBB SHADOW E&M-EST. PATIENT-LVL III: ICD-10-PCS | Mod: PBBFAC,,, | Performed by: ANESTHESIOLOGY

## 2022-09-01 PROCEDURE — 3075F PR MOST RECENT SYSTOLIC BLOOD PRESS GE 130-139MM HG: ICD-10-PCS | Mod: CPTII,S$GLB,, | Performed by: ANESTHESIOLOGY

## 2022-09-01 PROCEDURE — 3008F BODY MASS INDEX DOCD: CPT | Mod: CPTII,S$GLB,, | Performed by: ANESTHESIOLOGY

## 2022-09-01 PROCEDURE — 99999 PR PBB SHADOW E&M-EST. PATIENT-LVL III: CPT | Mod: PBBFAC,,, | Performed by: ANESTHESIOLOGY

## 2022-09-01 PROCEDURE — 99214 PR OFFICE/OUTPT VISIT, EST, LEVL IV, 30-39 MIN: ICD-10-PCS | Mod: 25,S$GLB,, | Performed by: ANESTHESIOLOGY

## 2022-09-01 PROCEDURE — 3008F PR BODY MASS INDEX (BMI) DOCUMENTED: ICD-10-PCS | Mod: CPTII,S$GLB,, | Performed by: ANESTHESIOLOGY

## 2022-09-01 PROCEDURE — 64616 CHEMODENERV MUSC NECK DYSTON: CPT | Mod: RT,S$GLB,, | Performed by: ANESTHESIOLOGY

## 2022-09-01 RX ORDER — HYDROCODONE BITARTRATE AND ACETAMINOPHEN 7.5; 325 MG/1; MG/1
1 TABLET ORAL EVERY 8 HOURS PRN
Qty: 90 TABLET | Refills: 0 | Status: SHIPPED | OUTPATIENT
Start: 2022-09-01 | End: 2022-10-01

## 2022-09-01 RX ORDER — HYDROCODONE BITARTRATE AND ACETAMINOPHEN 7.5; 325 MG/1; MG/1
1 TABLET ORAL EVERY 8 HOURS PRN
Qty: 90 TABLET | Refills: 0 | Status: SHIPPED | OUTPATIENT
Start: 2022-10-29 | End: 2022-11-28

## 2022-09-01 RX ORDER — HYDROCODONE BITARTRATE AND ACETAMINOPHEN 7.5; 325 MG/1; MG/1
1 TABLET ORAL EVERY 8 HOURS PRN
Qty: 90 TABLET | Refills: 0 | Status: SHIPPED | OUTPATIENT
Start: 2022-09-30 | End: 2022-10-30

## 2022-09-01 NOTE — PROGRESS NOTES
Referring Physician: Levi Robert MD    PCP: Fabián Darnell Jr, MD      CC:neck and right shoulder pain    Interval history:  Keshia Garza is a 62 y.o. female with chronic neck pain who returns our clinic.  She has difficult history of extensive cervical spine surgery.  Right-sided cervical MB RFA procedure back in June 2021. Initially, she reported minimal benefit, however, she now states pain is much improved .  Neck pain is improved with increased range of motion.  She is consider repeat in near future. She does have contractures of the neck due to previous surgery.  She is s/p Botox injection treatments for cervical dystonia.  She does state having moderate benefit from the Botox that helps for 3 months.  She is here for repeat botox treatments.  She wishes to resume Norco 7.5 mg q.8 hours as needed.  No side effects reported.  She denies any worsening weakness.  No bowel bladder changes.    Prior HPI:   Keshia Garza is a 62 y.o. female  referred to us for neck and right shoulder pain.  Patient with extensive history of cervical spine surgery.  She is s/p C4-T4 posterior cervical fusion on 2/1/2016 with subsequent hardware revision on 5/9/2016.  She states this help with her neck and radiating arm pain.  She had onset worsening neck and right shoulder pain over the past year.  No recent traumatic incident.  She has been evaluated by Neurosurgery.  She underwent updated cervical MRI and cervical CT.  She also had EMG study.  Pain is a constant aching, throbbing pain in her neck.  Pain radiates to her right shoulder.  She has numbness tingling down her bilateral arms.  She denies any worsening weakness.  No bowel bladder changes.  She takes NSAIDs with mild benefits.  She currently takes Lyrica with mild benefits as well.  She underwent a cervical LINDA prior to her surgery with minimal benefit.    Interventional History:  S/p cervical MB RFA on right at C4, 5, 6, 7 on 06/2021 with 50%  "relief    ROS:  CONSTITUTIONAL: No fevers, chills, night sweats, wt. loss, appetite changes  SKIN: no rashes or itching  ENT: No headaches, head trauma, vision changes, or eye pain  LYMPH NODES: None noticed   CV: No chest pain, palpitations.   RESP: No shortness of breath, dyspnea on exertion, cough, wheezing, or hemoptysis  GI: No nausea, emesis, diarrhea, constipation, melena, hematochezia, pain.    : No dysuria, hematuria, urgency, or frequency   HEME: No easy bruising, bleeding problems  PSYCHIATRIC: No depression, anxiety, psychosis, hallucinations.  NEURO: No seizures, memory loss, dizziness or difficulty sleeping  MSK:  Positive HPI      Past Medical History:   Diagnosis Date    Benitez esophagus     Bowel obstruction     2013 and had part of small bowel removal from adhesions    Depression     Difficult intravenous access 6/23/2015    Has medardo cath    Encounter for blood transfusion     GERD (gastroesophageal reflux disease)     H. pylori infection     Treated    Hormone replacement therapy (HRT) 6/23/2015    HTN (hypertension) 6/23/2015    Hypertension     Insomnia     Megaloblastic anemia     Pancreatitis     Portacath in place     PUD (peptic ulcer disease)     bleeding ulcer-2012    Stroke     Thyroid disease     Weight loss 6/23/2015     Past Surgical History:   Procedure Laterality Date    ABDOMINAL ADHESION SURGERY      anterior cervical spine fusion      C5-C7 "years ago" (per report in 2015)    Anterior cervical spine surgery  7/9/2015    BACK SURGERY      CHOLEDOCHODUODENOSTOMY      COLONOSCOPY      ERCP      HYSTERECTOMY      INJECTION OF ANESTHETIC AGENT AROUND MEDIAL BRANCH NERVES INNERVATING CERVICAL FACET JOINT Right 1/28/2021    Procedure: Block-nerve-medial branch-cervical;  Surgeon: Levi Robert MD;  Location: [facility];  Service: Pain Management;  Laterality: Right;  C4, 5, 6, 7     INJECTION OF ANESTHETIC AGENT AROUND MEDIAL BRANCH NERVES INNERVATING CERVICAL FACET JOINT Right 2/2/2021 " "   Procedure: Block-nerve-medial branch-cervical;  Surgeon: Levi Robert MD;  Location: [facility] OR;  Service: Pain Management;  Laterality: Right;  C4, 5, 6, 7     LIVER BIOPSY      NISSEN FUNDOPLICATION      oral implant      medardo cath      RADIOFREQUENCY ABLATION OF LUMBAR MEDIAL BRANCH NERVE AT SINGLE LEVEL Right 6/18/2021    Procedure: RADIOFREQUENCY ABLATION, NERVE, SPINAL, Cervical;  Surgeon: Levi Robert MD;  Location: [facility] OR;  Service: Pain Management;  Laterality: Right;  C4,5,6,7    SMALL INTESTINE SURGERY      SMALL INTESTINE SURGERY      UPPER GASTROINTESTINAL ENDOSCOPY       Family History   Problem Relation Age of Onset    Cancer Maternal Grandmother 50        colon     Social History     Socioeconomic History    Marital status:    Tobacco Use    Smoking status: Never    Smokeless tobacco: Never   Substance and Sexual Activity    Alcohol use: Yes     Comment: oocasional    Drug use: No    Sexual activity: Never         Medications/Allergies: See med card    Vitals:    09/01/22 0942   BP: 134/78   Pulse: 70   Weight: 54 kg (119 lb)   Height: 5' 1" (1.549 m)   PainSc:   8   PainLoc: Neck         Physical exam:    GENERAL: A and O x3, the patient appears well groomed and is in no acute distress.  Skin: No rashes or obvious lesions  HEENT: normocephalic, atraumatic  CARDIOVASCULAR:  RRR  LUNGS: non labored breathing  ABDOMEN: soft, nontender   UPPER EXTREMITIES: Normal alignment, normal range of motion, no atrophy, no skin changes,  hair growth and nail growth normal and equal bilaterally. No swelling, no tenderness.    LOWER EXTREMITIES:  Normal alignment, normal range of motion, no atrophy, no skin changes,  hair growth and nail growth normal and equal bilaterally. No swelling, no tenderness.  CERVICAL SPINE:  Cervical spine: ROM is very limited flexion, extension and lateral rotation with moderate increased pain.  There is contracture of the neck due to previous surgery  Spurling's maneuver causes " neck pain to right side.  Myofascial exam: moderate Tenderness to palpation across cervical paraspinous region on right  MENTAL STATUS: normal orientation, speech, language, and fund of knowledge for social situation.  Emotional state appropriate.    CRANIAL NERVES:  II:  PERRL bilaterally,   III,IV,VI: EOMI.    V:  Facial sensation equal bilaterally  VII:  Facial motor function normal.  VIII:  Hearing equal to finger rub bilaterally  IX/X: Gag normal, palate symmetric  XI:  Shoulder shrug equal, head turn equal  XII:  Tongue midline without fasciculations      MOTOR: Tone and bulk: normal bilateral upper and lower Strength: normal   Delt Bi Tri WE WF     R 5 5 5 5 5 5   L 5 5 5 5 5 5     IP ADD ABD Quad TA Gas HAM  R 5 5 5 5 5 5 5  L 5 5 5 5 5 5 5    SENSATION: Light touch and pinprick intact bilaterally  REFLEXES: normal, symmetric, nonbrisk.  Toes down, no clonus. No hoffmans.  GAIT: normal rise, base, steps, and arm swing.        Imaging:  MRI C-spine 1/2021  Impression:  1. Stable postsurgical changes of anterior cervical fusion at C3-C7 and posterior instrumented fusion at C4-T4, as described above, without evidence of acute hardware complication.  2. Multilevel degenerative changes of the cervical spine, as described in detail above, not significantly changed in comparison to the prior exam on 10/24/2018    CT C-spine 1/2021  C2-C3: Left asymmetric posterior disc osteophyte complex.  Mild left facet arthropathy.  Mild left neural foraminal stenosis.  There is no spinal canal stenosis.     C3-C4: Moderate left and mild right facet arthropathy.  Mild bilateral uncovertebral joint spurring.  Moderate left and mild-to-moderate right neural foraminal stenosis.  There is no spinal canal stenosis.     C4-C5: Mild posterior osteophyte complex.  Moderate bilateral facet arthropathy.  Mild bilateral uncovertebral spurring.  Mild left neural foraminal stenosis.  There is no spinal canal stenosis.     C5-C6: Mild  bilateral facet arthropathy.  Mild right uncovertebral joint spurring.  Mild right neural foraminal stenosis.  There is no spinal canal stenosis.     C6-C7: Left asymmetric posterior osteophyte complex.  Left facet arthropathy.  Moderate left uncovertebral spurring.  Mild left neural foraminal stenosis.  There is no spinal canal stenosis.     C7-T1: Bilateral uncovertebral spurring and prominent facet arthropathy.  Moderate to severe bilateral neural foraminal stenosis.  There is no spinal canal stenosis.     T1-2: Bilateral uncovertebral spurring and prominent facet arthropathy.  Severe left and moderate right neural foraminal stenosis.     T2-3:.  No significant neural foraminal or spinal canal stenosis.      Assessment:    Keshia Garza is a 62 y.o. female with neck and right shoulder pain  1. Cervical dystonia    2. Cervical post-laminectomy syndrome    3. Other spondylosis, cervical region    4. Chronic pain disorder        Plan:  1. I have stressed the importance of physical activity and exercise to improve overall health  2. Reviewed pertinent imaging and records with patient.  3.  She does have a component of cervical dystonia with improvement with botox. Perform repeat botox injections today  4.  Continue Hydrocodone 7.5/325 mg q 8 h #90. UDS 5/2022.  Script given for 3 months  5.  Patient with significant benefit following Cervical MB RFA.  Patient will continue to monitor her progress.  May consider repeat procedure in future if pain returns or worsens.   6. Follow up in 3 months for repeat botox    PROCEDURE NOTE:    Botox injections for cervical dystonia     Timeout performed prior to procedure.  A consent was signed after reviewing the risks of the injection with Botox and the patient decided to proceed. Forehead, occipital region, cervical region and temporal regions were cleaned with ChloraPrep. 200 units of Botox was drawn up with preservative free saline as the solution, with 5 units of Botox  per 0.1 milliliter. Muscles injected using 0.5inch 30g needle.  One hundred units were injected over the right cervical paraspinal muscle.  100 units was injected over the right trapezius.  Total units used was approximately 200units of Botox. The patient tolerated the procedure well, was observed for 15 minutes after the procedure and was discharged home in stable condition

## 2022-09-22 ENCOUNTER — PATIENT MESSAGE (OUTPATIENT)
Dept: ADMINISTRATIVE | Facility: OTHER | Age: 62
End: 2022-09-22
Payer: MEDICARE

## 2022-12-01 ENCOUNTER — OFFICE VISIT (OUTPATIENT)
Dept: PAIN MEDICINE | Facility: CLINIC | Age: 62
End: 2022-12-01
Payer: MEDICARE

## 2022-12-01 VITALS
DIASTOLIC BLOOD PRESSURE: 73 MMHG | HEART RATE: 62 BPM | BODY MASS INDEX: 22.48 KG/M2 | WEIGHT: 119.06 LBS | SYSTOLIC BLOOD PRESSURE: 116 MMHG | HEIGHT: 61 IN

## 2022-12-01 DIAGNOSIS — G89.4 CHRONIC PAIN DISORDER: ICD-10-CM

## 2022-12-01 DIAGNOSIS — M96.1 CERVICAL POST-LAMINECTOMY SYNDROME: ICD-10-CM

## 2022-12-01 DIAGNOSIS — G24.3 CERVICAL DYSTONIA: Primary | ICD-10-CM

## 2022-12-01 DIAGNOSIS — M47.892 OTHER SPONDYLOSIS, CERVICAL REGION: ICD-10-CM

## 2022-12-01 PROCEDURE — 3074F SYST BP LT 130 MM HG: CPT | Mod: CPTII,S$GLB,, | Performed by: ANESTHESIOLOGY

## 2022-12-01 PROCEDURE — 1159F PR MEDICATION LIST DOCUMENTED IN MEDICAL RECORD: ICD-10-PCS | Mod: CPTII,S$GLB,, | Performed by: ANESTHESIOLOGY

## 2022-12-01 PROCEDURE — 3074F PR MOST RECENT SYSTOLIC BLOOD PRESSURE < 130 MM HG: ICD-10-PCS | Mod: CPTII,S$GLB,, | Performed by: ANESTHESIOLOGY

## 2022-12-01 PROCEDURE — 3008F PR BODY MASS INDEX (BMI) DOCUMENTED: ICD-10-PCS | Mod: CPTII,S$GLB,, | Performed by: ANESTHESIOLOGY

## 2022-12-01 PROCEDURE — 3008F BODY MASS INDEX DOCD: CPT | Mod: CPTII,S$GLB,, | Performed by: ANESTHESIOLOGY

## 2022-12-01 PROCEDURE — 99999 PR PBB SHADOW E&M-EST. PATIENT-LVL IV: CPT | Mod: PBBFAC,,, | Performed by: ANESTHESIOLOGY

## 2022-12-01 PROCEDURE — 3078F DIAST BP <80 MM HG: CPT | Mod: CPTII,S$GLB,, | Performed by: ANESTHESIOLOGY

## 2022-12-01 PROCEDURE — 1159F MED LIST DOCD IN RCRD: CPT | Mod: CPTII,S$GLB,, | Performed by: ANESTHESIOLOGY

## 2022-12-01 PROCEDURE — 99214 PR OFFICE/OUTPT VISIT, EST, LEVL IV, 30-39 MIN: ICD-10-PCS | Mod: 25,S$GLB,, | Performed by: ANESTHESIOLOGY

## 2022-12-01 PROCEDURE — 99999 PR PBB SHADOW E&M-EST. PATIENT-LVL IV: ICD-10-PCS | Mod: PBBFAC,,, | Performed by: ANESTHESIOLOGY

## 2022-12-01 PROCEDURE — 64616 PR CHEMODENERVATION NECK MUSCLES EXC LARNYNX, UNI: ICD-10-PCS | Mod: RT,S$GLB,, | Performed by: ANESTHESIOLOGY

## 2022-12-01 PROCEDURE — 64616 CHEMODENERV MUSC NECK DYSTON: CPT | Mod: RT,S$GLB,, | Performed by: ANESTHESIOLOGY

## 2022-12-01 PROCEDURE — 99214 OFFICE O/P EST MOD 30 MIN: CPT | Mod: 25,S$GLB,, | Performed by: ANESTHESIOLOGY

## 2022-12-01 PROCEDURE — 3078F PR MOST RECENT DIASTOLIC BLOOD PRESSURE < 80 MM HG: ICD-10-PCS | Mod: CPTII,S$GLB,, | Performed by: ANESTHESIOLOGY

## 2022-12-01 RX ORDER — HYDROCODONE BITARTRATE AND ACETAMINOPHEN 7.5; 325 MG/1; MG/1
1 TABLET ORAL EVERY 8 HOURS PRN
Qty: 90 TABLET | Refills: 0 | Status: SHIPPED | OUTPATIENT
Start: 2022-12-08 | End: 2023-01-07

## 2022-12-01 RX ORDER — HYDROCODONE BITARTRATE AND ACETAMINOPHEN 7.5; 325 MG/1; MG/1
1 TABLET ORAL EVERY 8 HOURS PRN
Qty: 90 TABLET | Refills: 0 | Status: SHIPPED | OUTPATIENT
Start: 2023-01-06 | End: 2023-02-05

## 2022-12-01 RX ORDER — HYDROCODONE BITARTRATE AND ACETAMINOPHEN 7.5; 325 MG/1; MG/1
1 TABLET ORAL EVERY 8 HOURS PRN
Qty: 90 TABLET | Refills: 0 | Status: SHIPPED | OUTPATIENT
Start: 2023-02-04 | End: 2023-03-02 | Stop reason: SDUPTHER

## 2022-12-01 NOTE — PROGRESS NOTES
Referring Physician: Levi Robert MD    PCP: Fabián Darnell Jr, MD      CC:neck and right shoulder pain    Interval history:  Keshia Garza is a 62 y.o. female with chronic neck pain who returns our clinic.  She has difficult history of extensive cervical spine surgery.  Right-sided cervical MB RFA procedure back in June 2021. Initially, she reported minimal benefit, however, she now states pain is much improved .  Neck pain is improved with increased range of motion.  She is consider repeat in near future. She does have contractures of the neck due to previous surgery.  She is s/p Botox injection treatments for cervical dystonia.  She does state having moderate benefit from the Botox that helps for 3 months.  She is here for repeat botox treatments.  She also Norco 7.5 mg q.8 hours as needed.  No side effects reported.  She denies any worsening weakness.  No bowel bladder changes.    Prior HPI:   Keshia Garza is a 62 y.o. female  referred to us for neck and right shoulder pain.  Patient with extensive history of cervical spine surgery.  She is s/p C4-T4 posterior cervical fusion on 2/1/2016 with subsequent hardware revision on 5/9/2016.  She states this help with her neck and radiating arm pain.  She had onset worsening neck and right shoulder pain over the past year.  No recent traumatic incident.  She has been evaluated by Neurosurgery.  She underwent updated cervical MRI and cervical CT.  She also had EMG study.  Pain is a constant aching, throbbing pain in her neck.  Pain radiates to her right shoulder.  She has numbness tingling down her bilateral arms.  She denies any worsening weakness.  No bowel bladder changes.  She takes NSAIDs with mild benefits.  She currently takes Lyrica with mild benefits as well.  She underwent a cervical LINDA prior to her surgery with minimal benefit.    Interventional History:  S/p cervical MB RFA on right at C4, 5, 6, 7 on 06/2021 with 50% relief    ROS:  CONSTITUTIONAL: No  "fevers, chills, night sweats, wt. loss, appetite changes  SKIN: no rashes or itching  ENT: No headaches, head trauma, vision changes, or eye pain  LYMPH NODES: None noticed   CV: No chest pain, palpitations.   RESP: No shortness of breath, dyspnea on exertion, cough, wheezing, or hemoptysis  GI: No nausea, emesis, diarrhea, constipation, melena, hematochezia, pain.    : No dysuria, hematuria, urgency, or frequency   HEME: No easy bruising, bleeding problems  PSYCHIATRIC: No depression, anxiety, psychosis, hallucinations.  NEURO: No seizures, memory loss, dizziness or difficulty sleeping  MSK:  Positive HPI      Past Medical History:   Diagnosis Date    Benitez esophagus     Bowel obstruction     2013 and had part of small bowel removal from adhesions    Depression     Difficult intravenous access 6/23/2015    Has medardo cath    Encounter for blood transfusion     GERD (gastroesophageal reflux disease)     H. pylori infection     Treated    Hormone replacement therapy (HRT) 6/23/2015    HTN (hypertension) 6/23/2015    Hypertension     Insomnia     Megaloblastic anemia     Pancreatitis     Portacath in place     PUD (peptic ulcer disease)     bleeding ulcer-2012    Stroke     Thyroid disease     Weight loss 6/23/2015     Past Surgical History:   Procedure Laterality Date    ABDOMINAL ADHESION SURGERY      anterior cervical spine fusion      C5-C7 "years ago" (per report in 2015)    Anterior cervical spine surgery  7/9/2015    BACK SURGERY      CHOLEDOCHODUODENOSTOMY      COLONOSCOPY      ERCP      HYSTERECTOMY      INJECTION OF ANESTHETIC AGENT AROUND MEDIAL BRANCH NERVES INNERVATING CERVICAL FACET JOINT Right 1/28/2021    Procedure: Block-nerve-medial branch-cervical;  Surgeon: Levi Robert MD;  Location: [facility] OR;  Service: Pain Management;  Laterality: Right;  C4, 5, 6, 7     INJECTION OF ANESTHETIC AGENT AROUND MEDIAL BRANCH NERVES INNERVATING CERVICAL FACET JOINT Right 2/2/2021    Procedure: Block-nerve-medial " "branch-cervical;  Surgeon: Levi Robert MD;  Location: [facility] OR;  Service: Pain Management;  Laterality: Right;  C4, 5, 6, 7     LIVER BIOPSY      NISSEN FUNDOPLICATION      oral implant      medardo cath      RADIOFREQUENCY ABLATION OF LUMBAR MEDIAL BRANCH NERVE AT SINGLE LEVEL Right 6/18/2021    Procedure: RADIOFREQUENCY ABLATION, NERVE, SPINAL, Cervical;  Surgeon: Levi Robert MD;  Location: [facility] OR;  Service: Pain Management;  Laterality: Right;  C4,5,6,7    SMALL INTESTINE SURGERY      SMALL INTESTINE SURGERY      UPPER GASTROINTESTINAL ENDOSCOPY       Family History   Problem Relation Age of Onset    Cancer Maternal Grandmother 50        colon     Social History     Socioeconomic History    Marital status:    Tobacco Use    Smoking status: Never    Smokeless tobacco: Never   Substance and Sexual Activity    Alcohol use: Yes     Comment: oocasional    Drug use: No    Sexual activity: Never         Medications/Allergies: See med card    Vitals:    12/01/22 1006   BP: 116/73   Pulse: 62   Weight: 54 kg (119 lb 0.8 oz)   Height: 5' 1" (1.549 m)   PainSc:   7   PainLoc: Neck         Physical exam:    GENERAL: A and O x3, the patient appears well groomed and is in no acute distress.  Skin: No rashes or obvious lesions  HEENT: normocephalic, atraumatic  CARDIOVASCULAR:  RRR  LUNGS: non labored breathing  ABDOMEN: soft, nontender   UPPER EXTREMITIES: Normal alignment, normal range of motion, no atrophy, no skin changes,  hair growth and nail growth normal and equal bilaterally. No swelling, no tenderness.    LOWER EXTREMITIES:  Normal alignment, normal range of motion, no atrophy, no skin changes,  hair growth and nail growth normal and equal bilaterally. No swelling, no tenderness.  CERVICAL SPINE:  Cervical spine: ROM is very limited flexion, extension and lateral rotation with moderate increased pain.  There is contracture of the neck due to previous surgery  Spurling's maneuver causes neck pain to right " side.  Myofascial exam: moderate Tenderness to palpation across cervical paraspinous region on right  MENTAL STATUS: normal orientation, speech, language, and fund of knowledge for social situation.  Emotional state appropriate.    CRANIAL NERVES:  II:  PERRL bilaterally,   III,IV,VI: EOMI.    V:  Facial sensation equal bilaterally  VII:  Facial motor function normal.  VIII:  Hearing equal to finger rub bilaterally  IX/X: Gag normal, palate symmetric  XI:  Shoulder shrug equal, head turn equal  XII:  Tongue midline without fasciculations      MOTOR: Tone and bulk: normal bilateral upper and lower Strength: normal   Delt Bi Tri WE WF     R 5 5 5 5 5 5   L 5 5 5 5 5 5     IP ADD ABD Quad TA Gas HAM  R 5 5 5 5 5 5 5  L 5 5 5 5 5 5 5    SENSATION: Light touch and pinprick intact bilaterally  REFLEXES: normal, symmetric, nonbrisk.  Toes down, no clonus. No hoffmans.  GAIT: normal rise, base, steps, and arm swing.        Imaging:  MRI C-spine 1/2021  Impression:  1. Stable postsurgical changes of anterior cervical fusion at C3-C7 and posterior instrumented fusion at C4-T4, as described above, without evidence of acute hardware complication.  2. Multilevel degenerative changes of the cervical spine, as described in detail above, not significantly changed in comparison to the prior exam on 10/24/2018    CT C-spine 1/2021  C2-C3: Left asymmetric posterior disc osteophyte complex.  Mild left facet arthropathy.  Mild left neural foraminal stenosis.  There is no spinal canal stenosis.     C3-C4: Moderate left and mild right facet arthropathy.  Mild bilateral uncovertebral joint spurring.  Moderate left and mild-to-moderate right neural foraminal stenosis.  There is no spinal canal stenosis.     C4-C5: Mild posterior osteophyte complex.  Moderate bilateral facet arthropathy.  Mild bilateral uncovertebral spurring.  Mild left neural foraminal stenosis.  There is no spinal canal stenosis.     C5-C6: Mild bilateral facet  arthropathy.  Mild right uncovertebral joint spurring.  Mild right neural foraminal stenosis.  There is no spinal canal stenosis.     C6-C7: Left asymmetric posterior osteophyte complex.  Left facet arthropathy.  Moderate left uncovertebral spurring.  Mild left neural foraminal stenosis.  There is no spinal canal stenosis.     C7-T1: Bilateral uncovertebral spurring and prominent facet arthropathy.  Moderate to severe bilateral neural foraminal stenosis.  There is no spinal canal stenosis.     T1-2: Bilateral uncovertebral spurring and prominent facet arthropathy.  Severe left and moderate right neural foraminal stenosis.     T2-3:.  No significant neural foraminal or spinal canal stenosis.      Assessment:    Keshia Garza is a 62 y.o. female with neck and right shoulder pain  1. Cervical dystonia    2. Cervical post-laminectomy syndrome    3. Other spondylosis, cervical region    4. Chronic pain disorder        Plan:  1. I have stressed the importance of physical activity and exercise to improve overall health  2. Reviewed pertinent imaging and records with patient.  3.  She does have a component of cervical dystonia with improvement with botox. Perform repeat botox injections today  4.  Continue Hydrocodone 7.5/325 mg q 8 h #90. UDS 5/2022.  Script given for 3 months  5.  Patient with significant benefit following Cervical MB RFA.  Patient will continue to monitor her progress.  May consider repeat procedure in future if pain returns or worsens.   6. Follow up in 3 months for repeat botox    PROCEDURE NOTE:    Botox injections for cervical dystonia     Timeout performed prior to procedure.  A consent was signed after reviewing the risks of the injection with Botox and the patient decided to proceed. Forehead, occipital region, cervical region and temporal regions were cleaned with ChloraPrep. 200 units of Botox was drawn up with preservative free saline as the solution, with 5 units of Botox per 0.1  milliliter. Muscles injected using 0.5inch 30g needle.  One hundred units were injected over the right cervical paraspinal muscle.  100 units was injected over the right trapezius.  Total units used was approximately 200units of Botox. The patient tolerated the procedure well, was observed for 15 minutes after the procedure and was discharged home in stable condition

## 2023-03-02 ENCOUNTER — OFFICE VISIT (OUTPATIENT)
Dept: PAIN MEDICINE | Facility: CLINIC | Age: 63
End: 2023-03-02
Payer: MEDICARE

## 2023-03-02 VITALS
BODY MASS INDEX: 22.48 KG/M2 | HEART RATE: 58 BPM | DIASTOLIC BLOOD PRESSURE: 82 MMHG | WEIGHT: 119.06 LBS | SYSTOLIC BLOOD PRESSURE: 133 MMHG | HEIGHT: 61 IN

## 2023-03-02 DIAGNOSIS — G24.3 CERVICAL DYSTONIA: Primary | ICD-10-CM

## 2023-03-02 DIAGNOSIS — G89.4 CHRONIC PAIN DISORDER: ICD-10-CM

## 2023-03-02 DIAGNOSIS — M47.892 OTHER SPONDYLOSIS, CERVICAL REGION: ICD-10-CM

## 2023-03-02 DIAGNOSIS — M96.1 CERVICAL POST-LAMINECTOMY SYNDROME: ICD-10-CM

## 2023-03-02 PROCEDURE — 3008F BODY MASS INDEX DOCD: CPT | Mod: CPTII,S$GLB,, | Performed by: ANESTHESIOLOGY

## 2023-03-02 PROCEDURE — 99999 PR PBB SHADOW E&M-EST. PATIENT-LVL IV: CPT | Mod: PBBFAC,,, | Performed by: ANESTHESIOLOGY

## 2023-03-02 PROCEDURE — 3075F PR MOST RECENT SYSTOLIC BLOOD PRESS GE 130-139MM HG: ICD-10-PCS | Mod: CPTII,S$GLB,, | Performed by: ANESTHESIOLOGY

## 2023-03-02 PROCEDURE — 99214 OFFICE O/P EST MOD 30 MIN: CPT | Mod: 25,S$GLB,, | Performed by: ANESTHESIOLOGY

## 2023-03-02 PROCEDURE — 99214 PR OFFICE/OUTPT VISIT, EST, LEVL IV, 30-39 MIN: ICD-10-PCS | Mod: 25,S$GLB,, | Performed by: ANESTHESIOLOGY

## 2023-03-02 PROCEDURE — 64616 PR CHEMODENERVATION NECK MUSCLES EXC LARNYNX, UNI: ICD-10-PCS | Mod: RT,S$GLB,, | Performed by: ANESTHESIOLOGY

## 2023-03-02 PROCEDURE — 99999 PR PBB SHADOW E&M-EST. PATIENT-LVL IV: ICD-10-PCS | Mod: PBBFAC,,, | Performed by: ANESTHESIOLOGY

## 2023-03-02 PROCEDURE — 1159F PR MEDICATION LIST DOCUMENTED IN MEDICAL RECORD: ICD-10-PCS | Mod: CPTII,S$GLB,, | Performed by: ANESTHESIOLOGY

## 2023-03-02 PROCEDURE — 3075F SYST BP GE 130 - 139MM HG: CPT | Mod: CPTII,S$GLB,, | Performed by: ANESTHESIOLOGY

## 2023-03-02 PROCEDURE — 3008F PR BODY MASS INDEX (BMI) DOCUMENTED: ICD-10-PCS | Mod: CPTII,S$GLB,, | Performed by: ANESTHESIOLOGY

## 2023-03-02 PROCEDURE — 64616 CHEMODENERV MUSC NECK DYSTON: CPT | Mod: RT,S$GLB,, | Performed by: ANESTHESIOLOGY

## 2023-03-02 PROCEDURE — 3079F DIAST BP 80-89 MM HG: CPT | Mod: CPTII,S$GLB,, | Performed by: ANESTHESIOLOGY

## 2023-03-02 PROCEDURE — 1159F MED LIST DOCD IN RCRD: CPT | Mod: CPTII,S$GLB,, | Performed by: ANESTHESIOLOGY

## 2023-03-02 PROCEDURE — 3079F PR MOST RECENT DIASTOLIC BLOOD PRESSURE 80-89 MM HG: ICD-10-PCS | Mod: CPTII,S$GLB,, | Performed by: ANESTHESIOLOGY

## 2023-03-02 RX ORDER — HYDROCODONE BITARTRATE AND ACETAMINOPHEN 7.5; 325 MG/1; MG/1
1 TABLET ORAL EVERY 8 HOURS PRN
Qty: 90 TABLET | Refills: 0 | Status: SHIPPED | OUTPATIENT
Start: 2023-03-05 | End: 2023-04-04

## 2023-03-02 RX ORDER — HYDROCODONE BITARTRATE AND ACETAMINOPHEN 7.5; 325 MG/1; MG/1
1 TABLET ORAL EVERY 8 HOURS PRN
Qty: 90 TABLET | Refills: 0 | Status: SHIPPED | OUTPATIENT
Start: 2023-04-03 | End: 2023-05-03

## 2023-03-02 RX ORDER — HYDROCODONE BITARTRATE AND ACETAMINOPHEN 7.5; 325 MG/1; MG/1
1 TABLET ORAL EVERY 8 HOURS PRN
Qty: 90 TABLET | Refills: 0 | Status: SHIPPED | OUTPATIENT
Start: 2023-05-02 | End: 2023-06-01

## 2023-03-02 NOTE — PROGRESS NOTES
Referring Physician: Levi Robert MD    PCP: Fabián Darnell Jr, MD      CC:neck and right shoulder pain    Interval history:  Keshia Garza is a 63 y.o. female with chronic neck pain who returns our clinic.  She has difficult history of extensive cervical spine surgery.  Right-sided cervical MB RFA procedure back in June 2021. Initially, she reported minimal benefit, however, she now states pain is much improved .  Neck pain is improved with increased range of motion.  She is consider repeat in near future. She does have contractures of the neck due to previous surgery.  She is s/p Botox injection treatments for cervical dystonia.  She does state having moderate benefit from the Botox that helps for 3 months.  She is here for repeat botox treatments.  She also Norco 7.5 mg q.8 hours as needed.  No side effects reported.  She denies any worsening weakness.  No bowel bladder changes.    Prior HPI:   Keshia Garza is a 63 y.o. female  referred to us for neck and right shoulder pain.  Patient with extensive history of cervical spine surgery.  She is s/p C4-T4 posterior cervical fusion on 2/1/2016 with subsequent hardware revision on 5/9/2016.  She states this help with her neck and radiating arm pain.  She had onset worsening neck and right shoulder pain over the past year.  No recent traumatic incident.  She has been evaluated by Neurosurgery.  She underwent updated cervical MRI and cervical CT.  She also had EMG study.  Pain is a constant aching, throbbing pain in her neck.  Pain radiates to her right shoulder.  She has numbness tingling down her bilateral arms.  She denies any worsening weakness.  No bowel bladder changes.  She takes NSAIDs with mild benefits.  She currently takes Lyrica with mild benefits as well.  She underwent a cervical LINDA prior to her surgery with minimal benefit.    Interventional History:  S/p cervical MB RFA on right at C4, 5, 6, 7 on 06/2021 with 50% relief    ROS:  CONSTITUTIONAL: No  "fevers, chills, night sweats, wt. loss, appetite changes  SKIN: no rashes or itching  ENT: No headaches, head trauma, vision changes, or eye pain  LYMPH NODES: None noticed   CV: No chest pain, palpitations.   RESP: No shortness of breath, dyspnea on exertion, cough, wheezing, or hemoptysis  GI: No nausea, emesis, diarrhea, constipation, melena, hematochezia, pain.    : No dysuria, hematuria, urgency, or frequency   HEME: No easy bruising, bleeding problems  PSYCHIATRIC: No depression, anxiety, psychosis, hallucinations.  NEURO: No seizures, memory loss, dizziness or difficulty sleeping  MSK:  Positive HPI      Past Medical History:   Diagnosis Date    Benitez esophagus     Bowel obstruction     2013 and had part of small bowel removal from adhesions    Depression     Difficult intravenous access 6/23/2015    Has medardo cath    Encounter for blood transfusion     GERD (gastroesophageal reflux disease)     H. pylori infection     Treated    Hormone replacement therapy (HRT) 6/23/2015    HTN (hypertension) 6/23/2015    Hypertension     Insomnia     Megaloblastic anemia     Pancreatitis     Portacath in place     PUD (peptic ulcer disease)     bleeding ulcer-2012    Stroke     Thyroid disease     Weight loss 6/23/2015     Past Surgical History:   Procedure Laterality Date    ABDOMINAL ADHESION SURGERY      anterior cervical spine fusion      C5-C7 "years ago" (per report in 2015)    Anterior cervical spine surgery  7/9/2015    BACK SURGERY      CHOLEDOCHODUODENOSTOMY      COLONOSCOPY      ERCP      HYSTERECTOMY      INJECTION OF ANESTHETIC AGENT AROUND MEDIAL BRANCH NERVES INNERVATING CERVICAL FACET JOINT Right 1/28/2021    Procedure: Block-nerve-medial branch-cervical;  Surgeon: Levi Robert MD;  Location: [facility] OR;  Service: Pain Management;  Laterality: Right;  C4, 5, 6, 7     INJECTION OF ANESTHETIC AGENT AROUND MEDIAL BRANCH NERVES INNERVATING CERVICAL FACET JOINT Right 2/2/2021    Procedure: Block-nerve-medial " "branch-cervical;  Surgeon: Levi Robert MD;  Location: [facility] OR;  Service: Pain Management;  Laterality: Right;  C4, 5, 6, 7     LIVER BIOPSY      NISSEN FUNDOPLICATION      oral implant      medardo cath      RADIOFREQUENCY ABLATION OF LUMBAR MEDIAL BRANCH NERVE AT SINGLE LEVEL Right 6/18/2021    Procedure: RADIOFREQUENCY ABLATION, NERVE, SPINAL, Cervical;  Surgeon: Levi Robert MD;  Location: [facility] OR;  Service: Pain Management;  Laterality: Right;  C4,5,6,7    SMALL INTESTINE SURGERY      SMALL INTESTINE SURGERY      UPPER GASTROINTESTINAL ENDOSCOPY       Family History   Problem Relation Age of Onset    Cancer Maternal Grandmother 50        colon     Social History     Socioeconomic History    Marital status:    Tobacco Use    Smoking status: Never    Smokeless tobacco: Never   Substance and Sexual Activity    Alcohol use: Yes     Comment: oocasional    Drug use: No    Sexual activity: Never         Medications/Allergies: See med card    Vitals:    03/02/23 0925   BP: 133/82   Pulse: (!) 58   Weight: 54 kg (119 lb 0.8 oz)   Height: 5' 1" (1.549 m)   PainSc:   8   PainLoc: Neck         Physical exam:    GENERAL: A and O x3, the patient appears well groomed and is in no acute distress.  Skin: No rashes or obvious lesions  HEENT: normocephalic, atraumatic  CARDIOVASCULAR:  RRR  LUNGS: non labored breathing  ABDOMEN: soft, nontender   UPPER EXTREMITIES: Normal alignment, normal range of motion, no atrophy, no skin changes,  hair growth and nail growth normal and equal bilaterally. No swelling, no tenderness.    LOWER EXTREMITIES:  Normal alignment, normal range of motion, no atrophy, no skin changes,  hair growth and nail growth normal and equal bilaterally. No swelling, no tenderness.  CERVICAL SPINE:  Cervical spine: ROM is very limited flexion, extension and lateral rotation with moderate increased pain.  There is contracture of the neck due to previous surgery  Spurling's maneuver causes neck pain to right " side.  Myofascial exam: moderate Tenderness to palpation across cervical paraspinous region on right  MENTAL STATUS: normal orientation, speech, language, and fund of knowledge for social situation.  Emotional state appropriate.    CRANIAL NERVES:  II:  PERRL bilaterally,   III,IV,VI: EOMI.    V:  Facial sensation equal bilaterally  VII:  Facial motor function normal.  VIII:  Hearing equal to finger rub bilaterally  IX/X: Gag normal, palate symmetric  XI:  Shoulder shrug equal, head turn equal  XII:  Tongue midline without fasciculations      MOTOR: Tone and bulk: normal bilateral upper and lower Strength: normal   Delt Bi Tri WE WF     R 5 5 5 5 5 5   L 5 5 5 5 5 5     IP ADD ABD Quad TA Gas HAM  R 5 5 5 5 5 5 5  L 5 5 5 5 5 5 5    SENSATION: Light touch and pinprick intact bilaterally  REFLEXES: normal, symmetric, nonbrisk.  Toes down, no clonus. No hoffmans.  GAIT: normal rise, base, steps, and arm swing.        Imaging:  MRI C-spine 1/2021  Impression:  1. Stable postsurgical changes of anterior cervical fusion at C3-C7 and posterior instrumented fusion at C4-T4, as described above, without evidence of acute hardware complication.  2. Multilevel degenerative changes of the cervical spine, as described in detail above, not significantly changed in comparison to the prior exam on 10/24/2018    CT C-spine 1/2021  C2-C3: Left asymmetric posterior disc osteophyte complex.  Mild left facet arthropathy.  Mild left neural foraminal stenosis.  There is no spinal canal stenosis.     C3-C4: Moderate left and mild right facet arthropathy.  Mild bilateral uncovertebral joint spurring.  Moderate left and mild-to-moderate right neural foraminal stenosis.  There is no spinal canal stenosis.     C4-C5: Mild posterior osteophyte complex.  Moderate bilateral facet arthropathy.  Mild bilateral uncovertebral spurring.  Mild left neural foraminal stenosis.  There is no spinal canal stenosis.     C5-C6: Mild bilateral facet  arthropathy.  Mild right uncovertebral joint spurring.  Mild right neural foraminal stenosis.  There is no spinal canal stenosis.     C6-C7: Left asymmetric posterior osteophyte complex.  Left facet arthropathy.  Moderate left uncovertebral spurring.  Mild left neural foraminal stenosis.  There is no spinal canal stenosis.     C7-T1: Bilateral uncovertebral spurring and prominent facet arthropathy.  Moderate to severe bilateral neural foraminal stenosis.  There is no spinal canal stenosis.     T1-2: Bilateral uncovertebral spurring and prominent facet arthropathy.  Severe left and moderate right neural foraminal stenosis.     T2-3:.  No significant neural foraminal or spinal canal stenosis.      Assessment:    Keshia Garza is a 63 y.o. female with neck and right shoulder pain  1. Cervical dystonia    2. Cervical post-laminectomy syndrome    3. Other spondylosis, cervical region    4. Chronic pain disorder        Plan:  1. I have stressed the importance of physical activity and exercise to improve overall health  2. Reviewed pertinent imaging and records with patient.  3.  She does have a component of cervical dystonia with improvement with botox. Perform repeat botox injections today  4.  Continue Hydrocodone 7.5/325 mg q 8 h #90. UDS next visit.   Script given for 3 months  5.  Patient with significant benefit following Cervical MB RFA.  Patient will continue to monitor her progress.  May consider repeat procedure in future if pain returns or worsens.   6. Follow up in 3 months for repeat botox    PROCEDURE NOTE:    Botox injections for cervical dystonia     Timeout performed prior to procedure.  A consent was signed after reviewing the risks of the injection with Botox and the patient decided to proceed. Forehead, occipital region, cervical region and temporal regions were cleaned with ChloraPrep. 200 units of Botox was drawn up with preservative free saline as the solution, with 5 units of Botox per 0.1  milliliter. Muscles injected using 0.5inch 30g needle.  One hundred units were injected over the right cervical paraspinal muscle.  100 units was injected over the right trapezius.  Total units used was approximately 200units of Botox. The patient tolerated the procedure well, was observed for 15 minutes after the procedure and was discharged home in stable condition

## 2023-06-05 ENCOUNTER — TELEPHONE (OUTPATIENT)
Dept: PAIN MEDICINE | Facility: CLINIC | Age: 63
End: 2023-06-05

## 2023-06-05 ENCOUNTER — OFFICE VISIT (OUTPATIENT)
Dept: PAIN MEDICINE | Facility: CLINIC | Age: 63
End: 2023-06-05
Payer: MEDICARE

## 2023-06-05 VITALS
WEIGHT: 119 LBS | HEART RATE: 67 BPM | DIASTOLIC BLOOD PRESSURE: 91 MMHG | BODY MASS INDEX: 22.47 KG/M2 | HEIGHT: 61 IN | SYSTOLIC BLOOD PRESSURE: 134 MMHG

## 2023-06-05 DIAGNOSIS — G24.3 CERVICAL DYSTONIA: Primary | ICD-10-CM

## 2023-06-05 DIAGNOSIS — M96.1 CERVICAL POST-LAMINECTOMY SYNDROME: ICD-10-CM

## 2023-06-05 DIAGNOSIS — M47.892 OTHER SPONDYLOSIS, CERVICAL REGION: Primary | ICD-10-CM

## 2023-06-05 DIAGNOSIS — M47.892 OTHER SPONDYLOSIS, CERVICAL REGION: ICD-10-CM

## 2023-06-05 DIAGNOSIS — G89.4 CHRONIC PAIN DISORDER: ICD-10-CM

## 2023-06-05 PROCEDURE — 3075F SYST BP GE 130 - 139MM HG: CPT | Mod: CPTII,S$GLB,, | Performed by: ANESTHESIOLOGY

## 2023-06-05 PROCEDURE — 1159F MED LIST DOCD IN RCRD: CPT | Mod: CPTII,S$GLB,, | Performed by: ANESTHESIOLOGY

## 2023-06-05 PROCEDURE — 3008F BODY MASS INDEX DOCD: CPT | Mod: CPTII,S$GLB,, | Performed by: ANESTHESIOLOGY

## 2023-06-05 PROCEDURE — 64616 CHEMODENERV MUSC NECK DYSTON: CPT | Mod: RT,S$GLB,, | Performed by: ANESTHESIOLOGY

## 2023-06-05 PROCEDURE — 99999 PR PBB SHADOW E&M-EST. PATIENT-LVL III: ICD-10-PCS | Mod: PBBFAC,,, | Performed by: ANESTHESIOLOGY

## 2023-06-05 PROCEDURE — 99999 PR PBB SHADOW E&M-EST. PATIENT-LVL III: CPT | Mod: PBBFAC,,, | Performed by: ANESTHESIOLOGY

## 2023-06-05 PROCEDURE — 3080F PR MOST RECENT DIASTOLIC BLOOD PRESSURE >= 90 MM HG: ICD-10-PCS | Mod: CPTII,S$GLB,, | Performed by: ANESTHESIOLOGY

## 2023-06-05 PROCEDURE — 64616 PR CHEMODENERVATION NECK MUSCLES EXC LARNYNX, UNI: ICD-10-PCS | Mod: RT,S$GLB,, | Performed by: ANESTHESIOLOGY

## 2023-06-05 PROCEDURE — 3008F PR BODY MASS INDEX (BMI) DOCUMENTED: ICD-10-PCS | Mod: CPTII,S$GLB,, | Performed by: ANESTHESIOLOGY

## 2023-06-05 PROCEDURE — 99214 OFFICE O/P EST MOD 30 MIN: CPT | Mod: 25,S$GLB,, | Performed by: ANESTHESIOLOGY

## 2023-06-05 PROCEDURE — 3080F DIAST BP >= 90 MM HG: CPT | Mod: CPTII,S$GLB,, | Performed by: ANESTHESIOLOGY

## 2023-06-05 PROCEDURE — 1159F PR MEDICATION LIST DOCUMENTED IN MEDICAL RECORD: ICD-10-PCS | Mod: CPTII,S$GLB,, | Performed by: ANESTHESIOLOGY

## 2023-06-05 PROCEDURE — 3075F PR MOST RECENT SYSTOLIC BLOOD PRESS GE 130-139MM HG: ICD-10-PCS | Mod: CPTII,S$GLB,, | Performed by: ANESTHESIOLOGY

## 2023-06-05 PROCEDURE — 99214 PR OFFICE/OUTPT VISIT, EST, LEVL IV, 30-39 MIN: ICD-10-PCS | Mod: 25,S$GLB,, | Performed by: ANESTHESIOLOGY

## 2023-06-05 RX ORDER — HYDROCODONE BITARTRATE AND ACETAMINOPHEN 7.5; 325 MG/1; MG/1
1 TABLET ORAL EVERY 8 HOURS PRN
Qty: 90 TABLET | Refills: 0 | Status: SHIPPED | OUTPATIENT
Start: 2023-07-03 | End: 2023-07-18 | Stop reason: SDUPTHER

## 2023-06-05 RX ORDER — HYDROCODONE BITARTRATE AND ACETAMINOPHEN 7.5; 325 MG/1; MG/1
1 TABLET ORAL EVERY 8 HOURS PRN
Qty: 90 TABLET | Refills: 0 | Status: SHIPPED | OUTPATIENT
Start: 2023-06-05 | End: 2023-07-05

## 2023-06-05 RX ORDER — HYDROCODONE BITARTRATE AND ACETAMINOPHEN 7.5; 325 MG/1; MG/1
1 TABLET ORAL EVERY 8 HOURS PRN
Qty: 90 TABLET | Refills: 0 | Status: SHIPPED | OUTPATIENT
Start: 2023-08-01 | End: 2023-08-17

## 2023-06-05 NOTE — H&P (VIEW-ONLY)
Referring Physician: Levi Robert MD    PCP: Fabián Darnell Jr, MD      CC:neck and right shoulder pain    Interval history:  Keshia Garza is a 63 y.o. female with chronic neck pain who returns our clinic.  She has difficult history of extensive cervical spine surgery.  Right-sided cervical MB RFA procedure back in June 2021. Initially, she reported minimal benefit, however, she now states pain is much improved .  Neck pain is improved with increased range of motion.  She desires repeat in near future. She does have contractures of the neck due to previous surgery.  She is s/p Botox injection treatments for cervical dystonia.  She does state having moderate benefit from the Botox that helps for 3 months.  She is here for repeat botox treatments.  She also Norco 7.5 mg q.8 hours as needed.  No side effects reported.  She denies any worsening weakness.  No bowel bladder changes.    Prior HPI:   Keshia Garza is a 63 y.o. female  referred to us for neck and right shoulder pain.  Patient with extensive history of cervical spine surgery.  She is s/p C4-T4 posterior cervical fusion on 2/1/2016 with subsequent hardware revision on 5/9/2016.  She states this help with her neck and radiating arm pain.  She had onset worsening neck and right shoulder pain over the past year.  No recent traumatic incident.  She has been evaluated by Neurosurgery.  She underwent updated cervical MRI and cervical CT.  She also had EMG study.  Pain is a constant aching, throbbing pain in her neck.  Pain radiates to her right shoulder.  She has numbness tingling down her bilateral arms.  She denies any worsening weakness.  No bowel bladder changes.  She takes NSAIDs with mild benefits.  She currently takes Lyrica with mild benefits as well.  She underwent a cervical LINDA prior to her surgery with minimal benefit.    Interventional History:  S/p cervical MB RFA on right at C4, 5, 6, 7 on 06/2021 with 50% relief    ROS:  CONSTITUTIONAL: No  "fevers, chills, night sweats, wt. loss, appetite changes  SKIN: no rashes or itching  ENT: No headaches, head trauma, vision changes, or eye pain  LYMPH NODES: None noticed   CV: No chest pain, palpitations.   RESP: No shortness of breath, dyspnea on exertion, cough, wheezing, or hemoptysis  GI: No nausea, emesis, diarrhea, constipation, melena, hematochezia, pain.    : No dysuria, hematuria, urgency, or frequency   HEME: No easy bruising, bleeding problems  PSYCHIATRIC: No depression, anxiety, psychosis, hallucinations.  NEURO: No seizures, memory loss, dizziness or difficulty sleeping  MSK:  Positive HPI      Past Medical History:   Diagnosis Date    Benitez esophagus     Bowel obstruction     2013 and had part of small bowel removal from adhesions    Depression     Difficult intravenous access 6/23/2015    Has medardo cath    Encounter for blood transfusion     GERD (gastroesophageal reflux disease)     H. pylori infection     Treated    Hormone replacement therapy (HRT) 6/23/2015    HTN (hypertension) 6/23/2015    Hypertension     Insomnia     Megaloblastic anemia     Pancreatitis     Portacath in place     PUD (peptic ulcer disease)     bleeding ulcer-2012    Stroke     Thyroid disease     Weight loss 6/23/2015     Past Surgical History:   Procedure Laterality Date    ABDOMINAL ADHESION SURGERY      anterior cervical spine fusion      C5-C7 "years ago" (per report in 2015)    Anterior cervical spine surgery  7/9/2015    BACK SURGERY      CHOLEDOCHODUODENOSTOMY      COLONOSCOPY      ERCP      HYSTERECTOMY      INJECTION OF ANESTHETIC AGENT AROUND MEDIAL BRANCH NERVES INNERVATING CERVICAL FACET JOINT Right 1/28/2021    Procedure: Block-nerve-medial branch-cervical;  Surgeon: Levi Robert MD;  Location: [facility] OR;  Service: Pain Management;  Laterality: Right;  C4, 5, 6, 7     INJECTION OF ANESTHETIC AGENT AROUND MEDIAL BRANCH NERVES INNERVATING CERVICAL FACET JOINT Right 2/2/2021    Procedure: Block-nerve-medial " "branch-cervical;  Surgeon: Levi Robert MD;  Location: [facility] OR;  Service: Pain Management;  Laterality: Right;  C4, 5, 6, 7     LIVER BIOPSY      NISSEN FUNDOPLICATION      oral implant      medardo cath      RADIOFREQUENCY ABLATION OF LUMBAR MEDIAL BRANCH NERVE AT SINGLE LEVEL Right 6/18/2021    Procedure: RADIOFREQUENCY ABLATION, NERVE, SPINAL, Cervical;  Surgeon: Levi Robert MD;  Location: [facility] OR;  Service: Pain Management;  Laterality: Right;  C4,5,6,7    SMALL INTESTINE SURGERY      SMALL INTESTINE SURGERY      UPPER GASTROINTESTINAL ENDOSCOPY       Family History   Problem Relation Age of Onset    Cancer Maternal Grandmother 50        colon     Social History     Socioeconomic History    Marital status:    Tobacco Use    Smoking status: Never    Smokeless tobacco: Never   Substance and Sexual Activity    Alcohol use: Yes     Comment: oocasional    Drug use: No    Sexual activity: Never         Medications/Allergies: See med card    Vitals:    06/05/23 0824   BP: (!) 134/91   Pulse: 67   Weight: 54 kg (119 lb)   Height: 5' 1" (1.549 m)   PainSc:   9   PainLoc: Neck         Physical exam:    GENERAL: A and O x3, the patient appears well groomed and is in no acute distress.  Skin: No rashes or obvious lesions  HEENT: normocephalic, atraumatic  CARDIOVASCULAR:  RRR  LUNGS: non labored breathing  ABDOMEN: soft, nontender   UPPER EXTREMITIES: Normal alignment, normal range of motion, no atrophy, no skin changes,  hair growth and nail growth normal and equal bilaterally. No swelling, no tenderness.    LOWER EXTREMITIES:  Normal alignment, normal range of motion, no atrophy, no skin changes,  hair growth and nail growth normal and equal bilaterally. No swelling, no tenderness.  CERVICAL SPINE:  Cervical spine: ROM is very limited flexion, extension and lateral rotation with moderate increased pain.  There is contracture of the neck due to previous surgery  Spurling's maneuver causes neck pain to right " side.  Myofascial exam: moderate Tenderness to palpation across cervical paraspinous region on right  MENTAL STATUS: normal orientation, speech, language, and fund of knowledge for social situation.  Emotional state appropriate.    CRANIAL NERVES:  II:  PERRL bilaterally,   III,IV,VI: EOMI.    V:  Facial sensation equal bilaterally  VII:  Facial motor function normal.  VIII:  Hearing equal to finger rub bilaterally  IX/X: Gag normal, palate symmetric  XI:  Shoulder shrug equal, head turn equal  XII:  Tongue midline without fasciculations      MOTOR: Tone and bulk: normal bilateral upper and lower Strength: normal   Delt Bi Tri WE WF     R 5 5 5 5 5 5   L 5 5 5 5 5 5     IP ADD ABD Quad TA Gas HAM  R 5 5 5 5 5 5 5  L 5 5 5 5 5 5 5    SENSATION: Light touch and pinprick intact bilaterally  REFLEXES: normal, symmetric, nonbrisk.  Toes down, no clonus. No hoffmans.  GAIT: normal rise, base, steps, and arm swing.        Imaging:  MRI C-spine 1/2021  Impression:  1. Stable postsurgical changes of anterior cervical fusion at C3-C7 and posterior instrumented fusion at C4-T4, as described above, without evidence of acute hardware complication.  2. Multilevel degenerative changes of the cervical spine, as described in detail above, not significantly changed in comparison to the prior exam on 10/24/2018    CT C-spine 1/2021  C2-C3: Left asymmetric posterior disc osteophyte complex.  Mild left facet arthropathy.  Mild left neural foraminal stenosis.  There is no spinal canal stenosis.     C3-C4: Moderate left and mild right facet arthropathy.  Mild bilateral uncovertebral joint spurring.  Moderate left and mild-to-moderate right neural foraminal stenosis.  There is no spinal canal stenosis.     C4-C5: Mild posterior osteophyte complex.  Moderate bilateral facet arthropathy.  Mild bilateral uncovertebral spurring.  Mild left neural foraminal stenosis.  There is no spinal canal stenosis.     C5-C6: Mild bilateral facet  arthropathy.  Mild right uncovertebral joint spurring.  Mild right neural foraminal stenosis.  There is no spinal canal stenosis.     C6-C7: Left asymmetric posterior osteophyte complex.  Left facet arthropathy.  Moderate left uncovertebral spurring.  Mild left neural foraminal stenosis.  There is no spinal canal stenosis.     C7-T1: Bilateral uncovertebral spurring and prominent facet arthropathy.  Moderate to severe bilateral neural foraminal stenosis.  There is no spinal canal stenosis.     T1-2: Bilateral uncovertebral spurring and prominent facet arthropathy.  Severe left and moderate right neural foraminal stenosis.     T2-3:.  No significant neural foraminal or spinal canal stenosis.      Assessment:    Keshia Garza is a 63 y.o. female with neck and right shoulder pain  1. Cervical dystonia    2. Cervical post-laminectomy syndrome    3. Other spondylosis, cervical region    4. Chronic pain disorder        Plan:  1. I have stressed the importance of physical activity and exercise to improve overall health  2. Reviewed pertinent imaging and records with patient.  3.  She does have a component of cervical dystonia with improvement with botox. Perform repeat botox injections today  4.  Continue Hydrocodone 7.5/325 mg q 8 h #90. UDS next visit.   Script given for 3 months  5.  Patient with significant benefit following Cervical MB RFA.  She wishes to schedule repeat procedure  6. Follow up in 3 months for repeat botox    PROCEDURE NOTE:    Botox injections for cervical dystonia     Timeout performed prior to procedure.  A consent was signed after reviewing the risks of the injection with Botox and the patient decided to proceed. Forehead, occipital region, cervical region and temporal regions were cleaned with ChloraPrep. 200 units of Botox was drawn up with preservative free saline as the solution, with 5 units of Botox per 0.1 milliliter. Muscles injected using 0.5inch 30g needle.  One hundred units were  injected over the right cervical paraspinal muscle.  100 units was injected over the right trapezius.  Total units used was approximately 200units of Botox. The patient tolerated the procedure well, was observed for 15 minutes after the procedure and was discharged home in stable condition

## 2023-06-05 NOTE — PROGRESS NOTES
Referring Physician: eLvi Robert MD    PCP: Fabián Darnell Jr, MD      CC:neck and right shoulder pain    Interval history:  Keshia Garza is a 63 y.o. female with chronic neck pain who returns our clinic.  She has difficult history of extensive cervical spine surgery.  Right-sided cervical MB RFA procedure back in June 2021. Initially, she reported minimal benefit, however, she now states pain is much improved .  Neck pain is improved with increased range of motion.  She desires repeat in near future. She does have contractures of the neck due to previous surgery.  She is s/p Botox injection treatments for cervical dystonia.  She does state having moderate benefit from the Botox that helps for 3 months.  She is here for repeat botox treatments.  She also Norco 7.5 mg q.8 hours as needed.  No side effects reported.  She denies any worsening weakness.  No bowel bladder changes.    Prior HPI:   Keshia Garza is a 63 y.o. female  referred to us for neck and right shoulder pain.  Patient with extensive history of cervical spine surgery.  She is s/p C4-T4 posterior cervical fusion on 2/1/2016 with subsequent hardware revision on 5/9/2016.  She states this help with her neck and radiating arm pain.  She had onset worsening neck and right shoulder pain over the past year.  No recent traumatic incident.  She has been evaluated by Neurosurgery.  She underwent updated cervical MRI and cervical CT.  She also had EMG study.  Pain is a constant aching, throbbing pain in her neck.  Pain radiates to her right shoulder.  She has numbness tingling down her bilateral arms.  She denies any worsening weakness.  No bowel bladder changes.  She takes NSAIDs with mild benefits.  She currently takes Lyrica with mild benefits as well.  She underwent a cervical LINDA prior to her surgery with minimal benefit.    Interventional History:  S/p cervical MB RFA on right at C4, 5, 6, 7 on 06/2021 with 50% relief    ROS:  CONSTITUTIONAL: No  "fevers, chills, night sweats, wt. loss, appetite changes  SKIN: no rashes or itching  ENT: No headaches, head trauma, vision changes, or eye pain  LYMPH NODES: None noticed   CV: No chest pain, palpitations.   RESP: No shortness of breath, dyspnea on exertion, cough, wheezing, or hemoptysis  GI: No nausea, emesis, diarrhea, constipation, melena, hematochezia, pain.    : No dysuria, hematuria, urgency, or frequency   HEME: No easy bruising, bleeding problems  PSYCHIATRIC: No depression, anxiety, psychosis, hallucinations.  NEURO: No seizures, memory loss, dizziness or difficulty sleeping  MSK:  Positive HPI      Past Medical History:   Diagnosis Date    Benitez esophagus     Bowel obstruction     2013 and had part of small bowel removal from adhesions    Depression     Difficult intravenous access 6/23/2015    Has medardo cath    Encounter for blood transfusion     GERD (gastroesophageal reflux disease)     H. pylori infection     Treated    Hormone replacement therapy (HRT) 6/23/2015    HTN (hypertension) 6/23/2015    Hypertension     Insomnia     Megaloblastic anemia     Pancreatitis     Portacath in place     PUD (peptic ulcer disease)     bleeding ulcer-2012    Stroke     Thyroid disease     Weight loss 6/23/2015     Past Surgical History:   Procedure Laterality Date    ABDOMINAL ADHESION SURGERY      anterior cervical spine fusion      C5-C7 "years ago" (per report in 2015)    Anterior cervical spine surgery  7/9/2015    BACK SURGERY      CHOLEDOCHODUODENOSTOMY      COLONOSCOPY      ERCP      HYSTERECTOMY      INJECTION OF ANESTHETIC AGENT AROUND MEDIAL BRANCH NERVES INNERVATING CERVICAL FACET JOINT Right 1/28/2021    Procedure: Block-nerve-medial branch-cervical;  Surgeon: Levi Robert MD;  Location: [facility] OR;  Service: Pain Management;  Laterality: Right;  C4, 5, 6, 7     INJECTION OF ANESTHETIC AGENT AROUND MEDIAL BRANCH NERVES INNERVATING CERVICAL FACET JOINT Right 2/2/2021    Procedure: Block-nerve-medial " "branch-cervical;  Surgeon: Levi Robert MD;  Location: [facility] OR;  Service: Pain Management;  Laterality: Right;  C4, 5, 6, 7     LIVER BIOPSY      NISSEN FUNDOPLICATION      oral implant      medardo cath      RADIOFREQUENCY ABLATION OF LUMBAR MEDIAL BRANCH NERVE AT SINGLE LEVEL Right 6/18/2021    Procedure: RADIOFREQUENCY ABLATION, NERVE, SPINAL, Cervical;  Surgeon: Levi Robert MD;  Location: [facility] OR;  Service: Pain Management;  Laterality: Right;  C4,5,6,7    SMALL INTESTINE SURGERY      SMALL INTESTINE SURGERY      UPPER GASTROINTESTINAL ENDOSCOPY       Family History   Problem Relation Age of Onset    Cancer Maternal Grandmother 50        colon     Social History     Socioeconomic History    Marital status:    Tobacco Use    Smoking status: Never    Smokeless tobacco: Never   Substance and Sexual Activity    Alcohol use: Yes     Comment: oocasional    Drug use: No    Sexual activity: Never         Medications/Allergies: See med card    Vitals:    06/05/23 0824   BP: (!) 134/91   Pulse: 67   Weight: 54 kg (119 lb)   Height: 5' 1" (1.549 m)   PainSc:   9   PainLoc: Neck         Physical exam:    GENERAL: A and O x3, the patient appears well groomed and is in no acute distress.  Skin: No rashes or obvious lesions  HEENT: normocephalic, atraumatic  CARDIOVASCULAR:  RRR  LUNGS: non labored breathing  ABDOMEN: soft, nontender   UPPER EXTREMITIES: Normal alignment, normal range of motion, no atrophy, no skin changes,  hair growth and nail growth normal and equal bilaterally. No swelling, no tenderness.    LOWER EXTREMITIES:  Normal alignment, normal range of motion, no atrophy, no skin changes,  hair growth and nail growth normal and equal bilaterally. No swelling, no tenderness.  CERVICAL SPINE:  Cervical spine: ROM is very limited flexion, extension and lateral rotation with moderate increased pain.  There is contracture of the neck due to previous surgery  Spurling's maneuver causes neck pain to right " side.  Myofascial exam: moderate Tenderness to palpation across cervical paraspinous region on right  MENTAL STATUS: normal orientation, speech, language, and fund of knowledge for social situation.  Emotional state appropriate.    CRANIAL NERVES:  II:  PERRL bilaterally,   III,IV,VI: EOMI.    V:  Facial sensation equal bilaterally  VII:  Facial motor function normal.  VIII:  Hearing equal to finger rub bilaterally  IX/X: Gag normal, palate symmetric  XI:  Shoulder shrug equal, head turn equal  XII:  Tongue midline without fasciculations      MOTOR: Tone and bulk: normal bilateral upper and lower Strength: normal   Delt Bi Tri WE WF     R 5 5 5 5 5 5   L 5 5 5 5 5 5     IP ADD ABD Quad TA Gas HAM  R 5 5 5 5 5 5 5  L 5 5 5 5 5 5 5    SENSATION: Light touch and pinprick intact bilaterally  REFLEXES: normal, symmetric, nonbrisk.  Toes down, no clonus. No hoffmans.  GAIT: normal rise, base, steps, and arm swing.        Imaging:  MRI C-spine 1/2021  Impression:  1. Stable postsurgical changes of anterior cervical fusion at C3-C7 and posterior instrumented fusion at C4-T4, as described above, without evidence of acute hardware complication.  2. Multilevel degenerative changes of the cervical spine, as described in detail above, not significantly changed in comparison to the prior exam on 10/24/2018    CT C-spine 1/2021  C2-C3: Left asymmetric posterior disc osteophyte complex.  Mild left facet arthropathy.  Mild left neural foraminal stenosis.  There is no spinal canal stenosis.     C3-C4: Moderate left and mild right facet arthropathy.  Mild bilateral uncovertebral joint spurring.  Moderate left and mild-to-moderate right neural foraminal stenosis.  There is no spinal canal stenosis.     C4-C5: Mild posterior osteophyte complex.  Moderate bilateral facet arthropathy.  Mild bilateral uncovertebral spurring.  Mild left neural foraminal stenosis.  There is no spinal canal stenosis.     C5-C6: Mild bilateral facet  arthropathy.  Mild right uncovertebral joint spurring.  Mild right neural foraminal stenosis.  There is no spinal canal stenosis.     C6-C7: Left asymmetric posterior osteophyte complex.  Left facet arthropathy.  Moderate left uncovertebral spurring.  Mild left neural foraminal stenosis.  There is no spinal canal stenosis.     C7-T1: Bilateral uncovertebral spurring and prominent facet arthropathy.  Moderate to severe bilateral neural foraminal stenosis.  There is no spinal canal stenosis.     T1-2: Bilateral uncovertebral spurring and prominent facet arthropathy.  Severe left and moderate right neural foraminal stenosis.     T2-3:.  No significant neural foraminal or spinal canal stenosis.      Assessment:    Keshia Garza is a 63 y.o. female with neck and right shoulder pain  1. Cervical dystonia    2. Cervical post-laminectomy syndrome    3. Other spondylosis, cervical region    4. Chronic pain disorder        Plan:  1. I have stressed the importance of physical activity and exercise to improve overall health  2. Reviewed pertinent imaging and records with patient.  3.  She does have a component of cervical dystonia with improvement with botox. Perform repeat botox injections today  4.  Continue Hydrocodone 7.5/325 mg q 8 h #90. UDS next visit.   Script given for 3 months  5.  Patient with significant benefit following Cervical MB RFA.  She wishes to schedule repeat procedure  6. Follow up in 3 months for repeat botox    PROCEDURE NOTE:    Botox injections for cervical dystonia     Timeout performed prior to procedure.  A consent was signed after reviewing the risks of the injection with Botox and the patient decided to proceed. Forehead, occipital region, cervical region and temporal regions were cleaned with ChloraPrep. 200 units of Botox was drawn up with preservative free saline as the solution, with 5 units of Botox per 0.1 milliliter. Muscles injected using 0.5inch 30g needle.  One hundred units were  injected over the right cervical paraspinal muscle.  100 units was injected over the right trapezius.  Total units used was approximately 200units of Botox. The patient tolerated the procedure well, was observed for 15 minutes after the procedure and was discharged home in stable condition

## 2023-06-05 NOTE — TELEPHONE ENCOUNTER
Level of Service:71904     [place] OFFICE/OUTPT VISIT, EST, LEVL IV, 30-39 MIN      Types of orders made on 06/05/2023: Medications, Procedure Request      Order Date:6/5/2023   Ordering User:YUSEF DURANT [[id number]]   Encounter Provider:Yusef Robert MD [[id number]]   Authorizing Provider: Yusef Robert MD [[id number]]   Department:[facility] PAIN MANAGEMENT[[id number]]      Common Order Information   Procedure -> Radiofrequency Ablation (Specify level and laterality) Cmt: Right              C4,5,6,7      Order Specific Information   Order: Procedure Order to Pain Management [Custom: [id number]]  Order #:           [id number]: 1 FUTURE   Z   1   Priority: Routine  Class: Clinic Performed     Future Order Information       Expires on:06/05/2024            Expected by:06/05/2023

## 2023-06-28 ENCOUNTER — HOSPITAL ENCOUNTER (OUTPATIENT)
Facility: HOSPITAL | Age: 63
Discharge: HOME | End: 2023-06-28
Attending: ANESTHESIOLOGY | Admitting: ANESTHESIOLOGY
Payer: MEDICARE

## 2023-06-28 DIAGNOSIS — M47.892 OTHER SPONDYLOSIS, CERVICAL REGION: ICD-10-CM

## 2023-06-28 PROCEDURE — 63600175 PHARM REV CODE 636 W HCPCS: Performed by: ANESTHESIOLOGY

## 2023-06-28 PROCEDURE — 99900103 DSU ONLY-NO CHARGE-INITIAL HR (STAT): Performed by: ANESTHESIOLOGY

## 2023-06-28 PROCEDURE — 64634 PR DESTROY C/TH FACET JNT ADDL: ICD-10-PCS | Mod: RT,,, | Performed by: ANESTHESIOLOGY

## 2023-06-28 PROCEDURE — 64633 PR DESTROY CERV/THOR FACET JNT: ICD-10-PCS | Mod: RT,,, | Performed by: ANESTHESIOLOGY

## 2023-06-28 PROCEDURE — 64633 DESTROY CERV/THOR FACET JNT: CPT | Mod: RT,,, | Performed by: ANESTHESIOLOGY

## 2023-06-28 PROCEDURE — 25000003 PHARM REV CODE 250: Performed by: ANESTHESIOLOGY

## 2023-06-28 PROCEDURE — 27201423 OPTIME MED/SURG SUP & DEVICES STERILE SUPPLY: Performed by: ANESTHESIOLOGY

## 2023-06-28 PROCEDURE — 99900104 DSU ONLY-NO CHARGE-EA ADD'L HR (STAT): Performed by: ANESTHESIOLOGY

## 2023-06-28 PROCEDURE — 36000704 HC OR TIME LEV I 1ST 15 MIN: Performed by: ANESTHESIOLOGY

## 2023-06-28 PROCEDURE — 64634 DESTROY C/TH FACET JNT ADDL: CPT | Mod: RT,,, | Performed by: ANESTHESIOLOGY

## 2023-06-28 RX ORDER — FENTANYL CITRATE 50 UG/ML
INJECTION, SOLUTION INTRAMUSCULAR; INTRAVENOUS
Status: DISCONTINUED | OUTPATIENT
Start: 2023-06-28 | End: 2023-06-28 | Stop reason: HOSPADM

## 2023-06-28 RX ORDER — BUPIVACAINE HYDROCHLORIDE 2.5 MG/ML
INJECTION, SOLUTION EPIDURAL; INFILTRATION; INTRACAUDAL
Status: DISCONTINUED | OUTPATIENT
Start: 2023-06-28 | End: 2023-06-28 | Stop reason: HOSPADM

## 2023-06-28 RX ORDER — LIDOCAINE HYDROCHLORIDE 10 MG/ML
1 INJECTION, SOLUTION EPIDURAL; INFILTRATION; INTRACAUDAL; PERINEURAL ONCE
Status: DISCONTINUED | OUTPATIENT
Start: 2023-06-28 | End: 2023-06-28 | Stop reason: HOSPADM

## 2023-06-28 RX ORDER — MIDAZOLAM HYDROCHLORIDE 1 MG/ML
INJECTION INTRAMUSCULAR; INTRAVENOUS
Status: DISCONTINUED | OUTPATIENT
Start: 2023-06-28 | End: 2023-06-28 | Stop reason: HOSPADM

## 2023-06-28 RX ORDER — SODIUM CHLORIDE 9 MG/ML
500 INJECTION, SOLUTION INTRAVENOUS CONTINUOUS
Status: DISCONTINUED | OUTPATIENT
Start: 2023-06-28 | End: 2023-06-28 | Stop reason: HOSPADM

## 2023-06-28 RX ORDER — LIDOCAINE HYDROCHLORIDE 20 MG/ML
INJECTION, SOLUTION EPIDURAL; INFILTRATION; INTRACAUDAL; PERINEURAL
Status: DISCONTINUED | OUTPATIENT
Start: 2023-06-28 | End: 2023-06-28 | Stop reason: HOSPADM

## 2023-06-28 RX ORDER — SODIUM CHLORIDE, SODIUM LACTATE, POTASSIUM CHLORIDE, CALCIUM CHLORIDE 600; 310; 30; 20 MG/100ML; MG/100ML; MG/100ML; MG/100ML
INJECTION, SOLUTION INTRAVENOUS CONTINUOUS
Status: DISCONTINUED | OUTPATIENT
Start: 2023-06-28 | End: 2023-06-28 | Stop reason: HOSPADM

## 2023-06-28 RX ORDER — LIDOCAINE HYDROCHLORIDE 10 MG/ML
INJECTION, SOLUTION EPIDURAL; INFILTRATION; INTRACAUDAL; PERINEURAL
Status: DISCONTINUED | OUTPATIENT
Start: 2023-06-28 | End: 2023-06-28 | Stop reason: HOSPADM

## 2023-06-28 RX ADMIN — SODIUM CHLORIDE, POTASSIUM CHLORIDE, SODIUM LACTATE AND CALCIUM CHLORIDE: 600; 310; 30; 20 INJECTION, SOLUTION INTRAVENOUS at 01:06

## 2023-06-28 NOTE — OP NOTE
PROCEDURE DATE: 6/28/2023    PROCEDURE:  Radiofrequency ablation of the C4,5,6,7 medial branch nerves on the right side under fluoroscopy    DIAGNOSIS:  Other Cervical spondylosis  Post Op Diagnosis: Same    PHYSICIAN: Levi Robert MD    MEDICATIONS INJECTED:   1ml of 0.25%bupivicaine was injected at each level.    LOCAL ANESTHETIC USED:   1ml of lidocaine 1% at each level    SEDATION MEDICATIONS: RN IV sedation    ESTIMATED BLOOD LOSS:  NOne    COMPLICATIONS:  None    TECHNIQUE:   A time-out taken to identify patient and procedure side prior to starting the procedure.  The patient was positioned in the prone position. The patient was prepped and draped in the usual sterile fashion using ChloraPrep and sterile towels.  The levels were determined under fluoroscopic guidance using a slightly posteriorly oblique view.   Local anesthetic was infiltrated superficially at the skin.  Then a 100 mm 20g bent tip RF needle was inserted to the anatomic location of the midsection of the lateral masses of C4,5,6,7 on the right side(s). A cross table view was then taken to ensure that needles did not cross into neural foramina.  Impedance was less than 800 ohms at each level. Motor stimulation up to 2 volts confirmed there was no nerve root involvement at each level. Medication was then injected slowly.  Ablation was done per level utilizing radiofrequency generator at 80°C for 60 seconds. The patient tolerated the procedure well.     The patient was monitored after the procedure.  Patient was given post procedure and discharge instructions to follow at home.  The patient was discharged in a stable condition

## 2023-06-28 NOTE — PLAN OF CARE
Discharge instructions given to pt/, verbalized understanding.  Tolerating po fluids.  IV removed.  No c/o pain.  Ambulating out to   per RN in no distress.

## 2023-06-28 NOTE — DISCHARGE SUMMARY
[facility]  Discharge Note  Short Stay    Procedure(s) (LRB):  RADIOFREQUENCY THERMAL COAGULATION (Right)      OUTCOME: Patient tolerated treatment/procedure well without complication and is now ready for discharge.    DISPOSITION: Home or Self Care    FINAL DIAGNOSIS:  <principal problem not specified>    FOLLOWUP: In clinic    DISCHARGE INSTRUCTIONS:    Discharge Procedure Orders   Notify your health care provider if you experience any of the following:  temperature >100.4     Notify your health care provider if you experience any of the following:  severe uncontrolled pain     Notify your health care provider if you experience any of the following:  redness, tenderness, or signs of infection (pain, swelling, redness, odor or green/yellow discharge around incision site)     Activity as tolerated        TIME SPENT ON DISCHARGE: 30 minutes

## 2023-06-29 VITALS
SYSTOLIC BLOOD PRESSURE: 118 MMHG | WEIGHT: 119 LBS | HEIGHT: 61 IN | OXYGEN SATURATION: 95 % | RESPIRATION RATE: 17 BRPM | TEMPERATURE: 98 F | BODY MASS INDEX: 22.47 KG/M2 | HEART RATE: 70 BPM | DIASTOLIC BLOOD PRESSURE: 73 MMHG

## 2023-07-18 ENCOUNTER — PATIENT MESSAGE (OUTPATIENT)
Dept: PAIN MEDICINE | Facility: CLINIC | Age: 63
End: 2023-07-18
Payer: MEDICARE

## 2023-07-18 DIAGNOSIS — G24.3 CERVICAL DYSTONIA: ICD-10-CM

## 2023-07-18 DIAGNOSIS — M96.1 CERVICAL POST-LAMINECTOMY SYNDROME: ICD-10-CM

## 2023-07-18 RX ORDER — HYDROCODONE BITARTRATE AND ACETAMINOPHEN 7.5; 325 MG/1; MG/1
1 TABLET ORAL EVERY 8 HOURS PRN
Qty: 90 TABLET | Refills: 0 | Status: SHIPPED | OUTPATIENT
Start: 2023-07-18 | End: 2023-08-17

## 2023-08-11 ENCOUNTER — TELEPHONE (OUTPATIENT)
Dept: PAIN MEDICINE | Facility: CLINIC | Age: 63
End: 2023-08-11
Payer: MEDICARE

## 2023-08-17 ENCOUNTER — OFFICE VISIT (OUTPATIENT)
Dept: PAIN MEDICINE | Facility: CLINIC | Age: 63
End: 2023-08-17
Payer: MEDICARE

## 2023-08-17 VITALS
SYSTOLIC BLOOD PRESSURE: 121 MMHG | DIASTOLIC BLOOD PRESSURE: 67 MMHG | HEIGHT: 61 IN | WEIGHT: 119 LBS | HEART RATE: 64 BPM | BODY MASS INDEX: 22.47 KG/M2

## 2023-08-17 DIAGNOSIS — M47.892 OTHER SPONDYLOSIS, CERVICAL REGION: Primary | ICD-10-CM

## 2023-08-17 DIAGNOSIS — M96.1 CERVICAL POST-LAMINECTOMY SYNDROME: ICD-10-CM

## 2023-08-17 DIAGNOSIS — G24.3 CERVICAL DYSTONIA: ICD-10-CM

## 2023-08-17 PROCEDURE — 3074F PR MOST RECENT SYSTOLIC BLOOD PRESSURE < 130 MM HG: ICD-10-PCS | Mod: CPTII,S$GLB,, | Performed by: ANESTHESIOLOGY

## 2023-08-17 PROCEDURE — 1159F PR MEDICATION LIST DOCUMENTED IN MEDICAL RECORD: ICD-10-PCS | Mod: CPTII,S$GLB,, | Performed by: ANESTHESIOLOGY

## 2023-08-17 PROCEDURE — 1159F MED LIST DOCD IN RCRD: CPT | Mod: CPTII,S$GLB,, | Performed by: ANESTHESIOLOGY

## 2023-08-17 PROCEDURE — 99999 PR PBB SHADOW E&M-EST. PATIENT-LVL III: ICD-10-PCS | Mod: PBBFAC,,, | Performed by: ANESTHESIOLOGY

## 2023-08-17 PROCEDURE — 3008F BODY MASS INDEX DOCD: CPT | Mod: CPTII,S$GLB,, | Performed by: ANESTHESIOLOGY

## 2023-08-17 PROCEDURE — 3008F PR BODY MASS INDEX (BMI) DOCUMENTED: ICD-10-PCS | Mod: CPTII,S$GLB,, | Performed by: ANESTHESIOLOGY

## 2023-08-17 PROCEDURE — 99999 PR PBB SHADOW E&M-EST. PATIENT-LVL III: CPT | Mod: PBBFAC,,, | Performed by: ANESTHESIOLOGY

## 2023-08-17 PROCEDURE — 3074F SYST BP LT 130 MM HG: CPT | Mod: CPTII,S$GLB,, | Performed by: ANESTHESIOLOGY

## 2023-08-17 PROCEDURE — 99214 PR OFFICE/OUTPT VISIT, EST, LEVL IV, 30-39 MIN: ICD-10-PCS | Mod: S$GLB,,, | Performed by: ANESTHESIOLOGY

## 2023-08-17 PROCEDURE — 3078F DIAST BP <80 MM HG: CPT | Mod: CPTII,S$GLB,, | Performed by: ANESTHESIOLOGY

## 2023-08-17 PROCEDURE — 3078F PR MOST RECENT DIASTOLIC BLOOD PRESSURE < 80 MM HG: ICD-10-PCS | Mod: CPTII,S$GLB,, | Performed by: ANESTHESIOLOGY

## 2023-08-17 PROCEDURE — 99214 OFFICE O/P EST MOD 30 MIN: CPT | Mod: S$GLB,,, | Performed by: ANESTHESIOLOGY

## 2023-08-17 RX ORDER — HYDROCODONE BITARTRATE AND ACETAMINOPHEN 7.5; 325 MG/1; MG/1
1 TABLET ORAL EVERY 8 HOURS PRN
Qty: 90 TABLET | Refills: 0 | Status: SHIPPED | OUTPATIENT
Start: 2023-08-17 | End: 2023-09-18

## 2023-08-17 NOTE — PROGRESS NOTES
Referring Physician: No ref. provider found    PCP: Fabián Darnell Jr., MD      CC:neck and right shoulder pain    Interval history:  Keshia Garza is a 63 y.o. female with chronic neck pain who returns our clinic.  She has difficult history of extensive cervical spine surgery.  Right-sided cervical MB RFA procedure back in June 2023.   Neck pain is improved with increased range of motion. She does have contractures of the neck due to previous surgery.  She is s/p Botox injection treatments for cervical dystonia.  She does state having moderate benefit from the Botox that helps for 3 months.  She is scheduled for repeat botox treatments next month.  She also Norco 7.5 mg q.8 hours as needed.  No side effects reported.  She denies any worsening weakness.  No bowel bladder changes.    Prior HPI:   Keshia Garza is a 63 y.o. female  referred to us for neck and right shoulder pain.  Patient with extensive history of cervical spine surgery.  She is s/p C4-T4 posterior cervical fusion on 2/1/2016 with subsequent hardware revision on 5/9/2016.  She states this help with her neck and radiating arm pain.  She had onset worsening neck and right shoulder pain over the past year.  No recent traumatic incident.  She has been evaluated by Neurosurgery.  She underwent updated cervical MRI and cervical CT.  She also had EMG study.  Pain is a constant aching, throbbing pain in her neck.  Pain radiates to her right shoulder.  She has numbness tingling down her bilateral arms.  She denies any worsening weakness.  No bowel bladder changes.  She takes NSAIDs with mild benefits.  She currently takes Lyrica with mild benefits as well.  She underwent a cervical LINDA prior to her surgery with minimal benefit.    Interventional History:  S/p cervical MB RFA on right at C4, 5, 6, 7 on 06/2021 with 50% relief    ROS:  CONSTITUTIONAL: No fevers, chills, night sweats, wt. loss, appetite changes  SKIN: no rashes or itching  ENT: No  "headaches, head trauma, vision changes, or eye pain  LYMPH NODES: None noticed   CV: No chest pain, palpitations.   RESP: No shortness of breath, dyspnea on exertion, cough, wheezing, or hemoptysis  GI: No nausea, emesis, diarrhea, constipation, melena, hematochezia, pain.    : No dysuria, hematuria, urgency, or frequency   HEME: No easy bruising, bleeding problems  PSYCHIATRIC: No depression, anxiety, psychosis, hallucinations.  NEURO: No seizures, memory loss, dizziness or difficulty sleeping  MSK:  Positive HPI      Past Medical History:   Diagnosis Date    Benitez esophagus     Bowel obstruction     2013 and had part of small bowel removal from adhesions    Depression     Difficult intravenous access 6/23/2015    Has medardo cath    Encounter for blood transfusion     GERD (gastroesophageal reflux disease)     H. pylori infection     Treated    Hormone replacement therapy (HRT) 6/23/2015    HTN (hypertension) 6/23/2015    Hypertension     Insomnia     Megaloblastic anemia     Pancreatitis     Portacath in place     PUD (peptic ulcer disease)     bleeding ulcer-2012    Stroke     Thyroid disease     Weight loss 6/23/2015     Past Surgical History:   Procedure Laterality Date    ABDOMINAL ADHESION SURGERY      anterior cervical spine fusion      C5-C7 "years ago" (per report in 2015)    Anterior cervical spine surgery  7/9/2015    BACK SURGERY      CHOLEDOCHODUODENOSTOMY      COLONOSCOPY      ERCP      HYSTERECTOMY      INJECTION OF ANESTHETIC AGENT AROUND MEDIAL BRANCH NERVES INNERVATING CERVICAL FACET JOINT Right 1/28/2021    Procedure: Block-nerve-medial branch-cervical;  Surgeon: Levi Robert MD;  Location: [facility] OR;  Service: Pain Management;  Laterality: Right;  C4, 5, 6, 7     INJECTION OF ANESTHETIC AGENT AROUND MEDIAL BRANCH NERVES INNERVATING CERVICAL FACET JOINT Right 2/2/2021    Procedure: Block-nerve-medial branch-cervical;  Surgeon: Levi Robert MD;  Location: [facility] OR;  Service: Pain Management;  " "Laterality: Right;  C4, 5, 6, 7     LIVER BIOPSY      NISSEN FUNDOPLICATION      oral implant      medardo cath      RADIOFREQUENCY ABLATION OF LUMBAR MEDIAL BRANCH NERVE AT SINGLE LEVEL Right 6/18/2021    Procedure: RADIOFREQUENCY ABLATION, NERVE, SPINAL, Cervical;  Surgeon: Levi Robert MD;  Location: [facility] OR;  Service: Pain Management;  Laterality: Right;  C4,5,6,7    RADIOFREQUENCY THERMOCOAGULATION Right 6/28/2023    Procedure: RADIOFREQUENCY THERMAL COAGULATION;  Surgeon: Levi Robert MD;  Location: [facility] OR;  Service: Pain Management;  Laterality: Right;  c4,5,6,7 RFA    SMALL INTESTINE SURGERY      SMALL INTESTINE SURGERY      UPPER GASTROINTESTINAL ENDOSCOPY       Family History   Problem Relation Age of Onset    Cancer Maternal Grandmother 50        colon     Social History     Socioeconomic History    Marital status:    Tobacco Use    Smoking status: Never    Smokeless tobacco: Never   Substance and Sexual Activity    Alcohol use: Yes     Comment: oocasional    Drug use: No    Sexual activity: Never         Medications/Allergies: See med card    Vitals:    08/17/23 1025   BP: 121/67   Pulse: 64   Weight: 54 kg (119 lb)   Height: 5' 1" (1.549 m)   PainSc:   7   PainLoc: Neck         Physical exam:    GENERAL: A and O x3, the patient appears well groomed and is in no acute distress.  Skin: No rashes or obvious lesions  HEENT: normocephalic, atraumatic  CARDIOVASCULAR:  RRR  LUNGS: non labored breathing  ABDOMEN: soft, nontender   UPPER EXTREMITIES: Normal alignment, normal range of motion, no atrophy, no skin changes,  hair growth and nail growth normal and equal bilaterally. No swelling, no tenderness.    LOWER EXTREMITIES:  Normal alignment, normal range of motion, no atrophy, no skin changes,  hair growth and nail growth normal and equal bilaterally. No swelling, no tenderness.  CERVICAL SPINE:  Cervical spine: ROM is very limited flexion, extension and lateral rotation with moderate increased pain.  " There is contracture of the neck due to previous surgery  Spurling's maneuver causes neck pain to right side.  Myofascial exam: moderate Tenderness to palpation across cervical paraspinous region on right  MENTAL STATUS: normal orientation, speech, language, and fund of knowledge for social situation.  Emotional state appropriate.    CRANIAL NERVES:  II:  PERRL bilaterally,   III,IV,VI: EOMI.    V:  Facial sensation equal bilaterally  VII:  Facial motor function normal.  VIII:  Hearing equal to finger rub bilaterally  IX/X: Gag normal, palate symmetric  XI:  Shoulder shrug equal, head turn equal  XII:  Tongue midline without fasciculations      MOTOR: Tone and bulk: normal bilateral upper and lower Strength: normal   Delt Bi Tri WE WF     R 5 5 5 5 5 5   L 5 5 5 5 5 5     IP ADD ABD Quad TA Gas HAM  R 5 5 5 5 5 5 5  L 5 5 5 5 5 5 5    SENSATION: Light touch and pinprick intact bilaterally  REFLEXES: normal, symmetric, nonbrisk.  Toes down, no clonus. No hoffmans.  GAIT: normal rise, base, steps, and arm swing.        Imaging:  MRI C-spine 1/2021  Impression:  1. Stable postsurgical changes of anterior cervical fusion at C3-C7 and posterior instrumented fusion at C4-T4, as described above, without evidence of acute hardware complication.  2. Multilevel degenerative changes of the cervical spine, as described in detail above, not significantly changed in comparison to the prior exam on 10/24/2018    CT C-spine 1/2021  C2-C3: Left asymmetric posterior disc osteophyte complex.  Mild left facet arthropathy.  Mild left neural foraminal stenosis.  There is no spinal canal stenosis.     C3-C4: Moderate left and mild right facet arthropathy.  Mild bilateral uncovertebral joint spurring.  Moderate left and mild-to-moderate right neural foraminal stenosis.  There is no spinal canal stenosis.     C4-C5: Mild posterior osteophyte complex.  Moderate bilateral facet arthropathy.  Mild bilateral uncovertebral spurring.  Mild  left neural foraminal stenosis.  There is no spinal canal stenosis.     C5-C6: Mild bilateral facet arthropathy.  Mild right uncovertebral joint spurring.  Mild right neural foraminal stenosis.  There is no spinal canal stenosis.     C6-C7: Left asymmetric posterior osteophyte complex.  Left facet arthropathy.  Moderate left uncovertebral spurring.  Mild left neural foraminal stenosis.  There is no spinal canal stenosis.     C7-T1: Bilateral uncovertebral spurring and prominent facet arthropathy.  Moderate to severe bilateral neural foraminal stenosis.  There is no spinal canal stenosis.     T1-2: Bilateral uncovertebral spurring and prominent facet arthropathy.  Severe left and moderate right neural foraminal stenosis.     T2-3:.  No significant neural foraminal or spinal canal stenosis.      Assessment:    Keshia Garza is a 63 y.o. female with neck and right shoulder pain  1. Other spondylosis, cervical region    2. Cervical dystonia    3. Cervical post-laminectomy syndrome        Plan:  1. I have stressed the importance of physical activity and exercise to improve overall health  2. Reviewed pertinent imaging and records with patient.  3.  She does have a component of cervical dystonia with improvement with botox. Plan for repeat botox injections next visit  4.  Continue Hydrocodone 7.5/325 mg q 8 h #90. UDS next visit.    5.  Patient with significant benefit following Cervical MB RFA.  Patient will continue to monitor her progress.  May consider repeat procedure in future if pain returns or worsens.   6. Follow up in 1month for botox

## 2023-09-18 ENCOUNTER — OFFICE VISIT (OUTPATIENT)
Dept: PAIN MEDICINE | Facility: CLINIC | Age: 63
End: 2023-09-18
Payer: MEDICARE

## 2023-09-18 VITALS
HEART RATE: 63 BPM | DIASTOLIC BLOOD PRESSURE: 83 MMHG | SYSTOLIC BLOOD PRESSURE: 132 MMHG | WEIGHT: 119 LBS | BODY MASS INDEX: 22.47 KG/M2 | HEIGHT: 61 IN

## 2023-09-18 DIAGNOSIS — G24.3 CERVICAL DYSTONIA: ICD-10-CM

## 2023-09-18 DIAGNOSIS — G89.4 CHRONIC PAIN DISORDER: ICD-10-CM

## 2023-09-18 DIAGNOSIS — M47.892 OTHER SPONDYLOSIS, CERVICAL REGION: ICD-10-CM

## 2023-09-18 DIAGNOSIS — M96.1 CERVICAL POST-LAMINECTOMY SYNDROME: ICD-10-CM

## 2023-09-18 DIAGNOSIS — Z79.891 CHRONIC USE OF OPIATE FOR THERAPEUTIC PURPOSE: Primary | ICD-10-CM

## 2023-09-18 PROCEDURE — 99214 PR OFFICE/OUTPT VISIT, EST, LEVL IV, 30-39 MIN: ICD-10-PCS | Mod: 25,S$GLB,, | Performed by: ANESTHESIOLOGY

## 2023-09-18 PROCEDURE — 80326 AMPHETAMINES 5 OR MORE: CPT | Performed by: ANESTHESIOLOGY

## 2023-09-18 PROCEDURE — 99214 OFFICE O/P EST MOD 30 MIN: CPT | Mod: 25,S$GLB,, | Performed by: ANESTHESIOLOGY

## 2023-09-18 PROCEDURE — 1159F MED LIST DOCD IN RCRD: CPT | Mod: CPTII,S$GLB,, | Performed by: ANESTHESIOLOGY

## 2023-09-18 PROCEDURE — 3008F BODY MASS INDEX DOCD: CPT | Mod: CPTII,S$GLB,, | Performed by: ANESTHESIOLOGY

## 2023-09-18 PROCEDURE — 64616 CHEMODENERV MUSC NECK DYSTON: CPT | Mod: 50,S$GLB,, | Performed by: ANESTHESIOLOGY

## 2023-09-18 PROCEDURE — 3079F PR MOST RECENT DIASTOLIC BLOOD PRESSURE 80-89 MM HG: ICD-10-PCS | Mod: CPTII,S$GLB,, | Performed by: ANESTHESIOLOGY

## 2023-09-18 PROCEDURE — 1159F PR MEDICATION LIST DOCUMENTED IN MEDICAL RECORD: ICD-10-PCS | Mod: CPTII,S$GLB,, | Performed by: ANESTHESIOLOGY

## 2023-09-18 PROCEDURE — 99999 PR PBB SHADOW E&M-EST. PATIENT-LVL III: ICD-10-PCS | Mod: PBBFAC,,, | Performed by: ANESTHESIOLOGY

## 2023-09-18 PROCEDURE — 64616 BOTULINUM INJECTION: ICD-10-PCS | Mod: 50,S$GLB,, | Performed by: ANESTHESIOLOGY

## 2023-09-18 PROCEDURE — 99999 PR PBB SHADOW E&M-EST. PATIENT-LVL III: CPT | Mod: PBBFAC,,, | Performed by: ANESTHESIOLOGY

## 2023-09-18 PROCEDURE — 3075F SYST BP GE 130 - 139MM HG: CPT | Mod: CPTII,S$GLB,, | Performed by: ANESTHESIOLOGY

## 2023-09-18 PROCEDURE — 3008F PR BODY MASS INDEX (BMI) DOCUMENTED: ICD-10-PCS | Mod: CPTII,S$GLB,, | Performed by: ANESTHESIOLOGY

## 2023-09-18 PROCEDURE — 3075F PR MOST RECENT SYSTOLIC BLOOD PRESS GE 130-139MM HG: ICD-10-PCS | Mod: CPTII,S$GLB,, | Performed by: ANESTHESIOLOGY

## 2023-09-18 PROCEDURE — 3079F DIAST BP 80-89 MM HG: CPT | Mod: CPTII,S$GLB,, | Performed by: ANESTHESIOLOGY

## 2023-09-18 RX ORDER — HYDROCODONE BITARTRATE AND ACETAMINOPHEN 7.5; 325 MG/1; MG/1
1 TABLET ORAL EVERY 8 HOURS PRN
Qty: 90 TABLET | Refills: 0 | Status: SHIPPED | OUTPATIENT
Start: 2023-11-15 | End: 2023-12-18 | Stop reason: SDUPTHER

## 2023-09-18 RX ORDER — HYDROCODONE BITARTRATE AND ACETAMINOPHEN 7.5; 325 MG/1; MG/1
1 TABLET ORAL EVERY 8 HOURS PRN
Qty: 90 TABLET | Refills: 0 | Status: SHIPPED | OUTPATIENT
Start: 2023-09-18 | End: 2023-10-18

## 2023-09-18 RX ORDER — HYDROCODONE BITARTRATE AND ACETAMINOPHEN 7.5; 325 MG/1; MG/1
1 TABLET ORAL EVERY 8 HOURS PRN
Qty: 90 TABLET | Refills: 0 | Status: SHIPPED | OUTPATIENT
Start: 2023-10-17 | End: 2023-11-16

## 2023-09-18 NOTE — PROGRESS NOTES
Referring Physician: No ref. provider found    PCP: Fabián Darnell Jr., MD      CC:neck and right shoulder pain    Interval history:  Keshia Garza is a 63 y.o. female with chronic neck pain who returns our clinic.  She has difficult history of extensive cervical spine surgery.  Right-sided cervical MB RFA procedure back in June 2023.   Neck pain is improved with increased range of motion. She does have contractures of the neck due to previous surgery.  She is s/p Botox injection treatments for cervical dystonia.  She does state having moderate benefit from the Botox that helps for 3 months.  She is scheduled for repeat botox treatments today  She also Norco 7.5 mg q.8 hours as needed.  No side effects reported.  She denies any worsening weakness.  No bowel bladder changes.    Prior HPI:   Keshia Garza is a 63 y.o. female  referred to us for neck and right shoulder pain.  Patient with extensive history of cervical spine surgery.  She is s/p C4-T4 posterior cervical fusion on 2/1/2016 with subsequent hardware revision on 5/9/2016.  She states this help with her neck and radiating arm pain.  She had onset worsening neck and right shoulder pain over the past year.  No recent traumatic incident.  She has been evaluated by Neurosurgery.  She underwent updated cervical MRI and cervical CT.  She also had EMG study.  Pain is a constant aching, throbbing pain in her neck.  Pain radiates to her right shoulder.  She has numbness tingling down her bilateral arms.  She denies any worsening weakness.  No bowel bladder changes.  She takes NSAIDs with mild benefits.  She currently takes Lyrica with mild benefits as well.  She underwent a cervical LINDA prior to her surgery with minimal benefit.    Interventional History:  S/p cervical MB RFA on right at C4, 5, 6, 7 on 06/2021, 6/2023 with 50% relief    ROS:  CONSTITUTIONAL: No fevers, chills, night sweats, wt. loss, appetite changes  SKIN: no rashes or itching  ENT: No  "headaches, head trauma, vision changes, or eye pain  LYMPH NODES: None noticed   CV: No chest pain, palpitations.   RESP: No shortness of breath, dyspnea on exertion, cough, wheezing, or hemoptysis  GI: No nausea, emesis, diarrhea, constipation, melena, hematochezia, pain.    : No dysuria, hematuria, urgency, or frequency   HEME: No easy bruising, bleeding problems  PSYCHIATRIC: No depression, anxiety, psychosis, hallucinations.  NEURO: No seizures, memory loss, dizziness or difficulty sleeping  MSK:  Positive HPI      Past Medical History:   Diagnosis Date    Benitez esophagus     Bowel obstruction     2013 and had part of small bowel removal from adhesions    Depression     Difficult intravenous access 6/23/2015    Has medardo cath    Encounter for blood transfusion     GERD (gastroesophageal reflux disease)     H. pylori infection     Treated    Hormone replacement therapy (HRT) 6/23/2015    HTN (hypertension) 6/23/2015    Hypertension     Insomnia     Megaloblastic anemia     Pancreatitis     Portacath in place     PUD (peptic ulcer disease)     bleeding ulcer-2012    Stroke     Thyroid disease     Weight loss 6/23/2015     Past Surgical History:   Procedure Laterality Date    ABDOMINAL ADHESION SURGERY      anterior cervical spine fusion      C5-C7 "years ago" (per report in 2015)    Anterior cervical spine surgery  7/9/2015    BACK SURGERY      CHOLEDOCHODUODENOSTOMY      COLONOSCOPY      ERCP      HYSTERECTOMY      INJECTION OF ANESTHETIC AGENT AROUND MEDIAL BRANCH NERVES INNERVATING CERVICAL FACET JOINT Right 1/28/2021    Procedure: Block-nerve-medial branch-cervical;  Surgeon: Levi Robert MD;  Location: [facility] OR;  Service: Pain Management;  Laterality: Right;  C4, 5, 6, 7     INJECTION OF ANESTHETIC AGENT AROUND MEDIAL BRANCH NERVES INNERVATING CERVICAL FACET JOINT Right 2/2/2021    Procedure: Block-nerve-medial branch-cervical;  Surgeon: Levi Robert MD;  Location: [facility] OR;  Service: Pain Management;  " "Laterality: Right;  C4, 5, 6, 7     LIVER BIOPSY      NISSEN FUNDOPLICATION      oral implant      medardo cath      RADIOFREQUENCY ABLATION OF LUMBAR MEDIAL BRANCH NERVE AT SINGLE LEVEL Right 6/18/2021    Procedure: RADIOFREQUENCY ABLATION, NERVE, SPINAL, Cervical;  Surgeon: Levi Robert MD;  Location: [facility] OR;  Service: Pain Management;  Laterality: Right;  C4,5,6,7    RADIOFREQUENCY THERMOCOAGULATION Right 6/28/2023    Procedure: RADIOFREQUENCY THERMAL COAGULATION;  Surgeon: Levi Robert MD;  [facility];  Service: Pain Management;  Laterality: Right;  c4,5,6,7 RFA    SMALL INTESTINE SURGERY      SMALL INTESTINE SURGERY      UPPER GASTROINTESTINAL ENDOSCOPY       Family History   Problem Relation Age of Onset    Cancer Maternal Grandmother 50        colon     Social History     Socioeconomic History    Marital status:    Tobacco Use    Smoking status: Never    Smokeless tobacco: Never   Substance and Sexual Activity    Alcohol use: Yes     Comment: oocasional    Drug use: No    Sexual activity: Never         Medications/Allergies: See med card    Vitals:    09/18/23 1031   BP: 132/83   Pulse: 63   Weight: 54 kg (119 lb)   Height: 5' 1" (1.549 m)   PainSc:   8   PainLoc: Neck         Physical exam:    GENERAL: A and O x3, the patient appears well groomed and is in no acute distress.  Skin: No rashes or obvious lesions  HEENT: normocephalic, atraumatic  CARDIOVASCULAR:  RRR  LUNGS: non labored breathing  ABDOMEN: soft, nontender   UPPER EXTREMITIES: Normal alignment, normal range of motion, no atrophy, no skin changes,  hair growth and nail growth normal and equal bilaterally. No swelling, no tenderness.    LOWER EXTREMITIES:  Normal alignment, normal range of motion, no atrophy, no skin changes,  hair growth and nail growth normal and equal bilaterally. No swelling, no tenderness.  CERVICAL SPINE:  Cervical spine: ROM is very limited flexion, extension and lateral rotation with moderate increased pain.  " There is contracture of the neck due to previous surgery  Spurling's maneuver causes neck pain to right side.  Myofascial exam: moderate Tenderness to palpation across cervical paraspinous region on right  MENTAL STATUS: normal orientation, speech, language, and fund of knowledge for social situation.  Emotional state appropriate.    CRANIAL NERVES:  II:  PERRL bilaterally,   III,IV,VI: EOMI.    V:  Facial sensation equal bilaterally  VII:  Facial motor function normal.  VIII:  Hearing equal to finger rub bilaterally  IX/X: Gag normal, palate symmetric  XI:  Shoulder shrug equal, head turn equal  XII:  Tongue midline without fasciculations      MOTOR: Tone and bulk: normal bilateral upper and lower Strength: normal   Delt Bi Tri WE WF     R 5 5 5 5 5 5   L 5 5 5 5 5 5     IP ADD ABD Quad TA Gas HAM  R 5 5 5 5 5 5 5  L 5 5 5 5 5 5 5    SENSATION: Light touch and pinprick intact bilaterally  REFLEXES: normal, symmetric, nonbrisk.  Toes down, no clonus. No hoffmans.  GAIT: normal rise, base, steps, and arm swing.        Imaging:  MRI C-spine 1/2021  Impression:  1. Stable postsurgical changes of anterior cervical fusion at C3-C7 and posterior instrumented fusion at C4-T4, as described above, without evidence of acute hardware complication.  2. Multilevel degenerative changes of the cervical spine, as described in detail above, not significantly changed in comparison to the prior exam on 10/24/2018    CT C-spine 1/2021  C2-C3: Left asymmetric posterior disc osteophyte complex.  Mild left facet arthropathy.  Mild left neural foraminal stenosis.  There is no spinal canal stenosis.     C3-C4: Moderate left and mild right facet arthropathy.  Mild bilateral uncovertebral joint spurring.  Moderate left and mild-to-moderate right neural foraminal stenosis.  There is no spinal canal stenosis.     C4-C5: Mild posterior osteophyte complex.  Moderate bilateral facet arthropathy.  Mild bilateral uncovertebral spurring.  Mild  left neural foraminal stenosis.  There is no spinal canal stenosis.     C5-C6: Mild bilateral facet arthropathy.  Mild right uncovertebral joint spurring.  Mild right neural foraminal stenosis.  There is no spinal canal stenosis.     C6-C7: Left asymmetric posterior osteophyte complex.  Left facet arthropathy.  Moderate left uncovertebral spurring.  Mild left neural foraminal stenosis.  There is no spinal canal stenosis.     C7-T1: Bilateral uncovertebral spurring and prominent facet arthropathy.  Moderate to severe bilateral neural foraminal stenosis.  There is no spinal canal stenosis.     T1-2: Bilateral uncovertebral spurring and prominent facet arthropathy.  Severe left and moderate right neural foraminal stenosis.     T2-3:.  No significant neural foraminal or spinal canal stenosis.      Assessment:    Keshia Garza is a 63 y.o. female with neck and right shoulder pain  1. Chronic use of opiate for therapeutic purpose    2. Cervical dystonia    3. Cervical post-laminectomy syndrome    4. Other spondylosis, cervical region    5. Chronic pain disorder        Plan:  1. I have stressed the importance of physical activity and exercise to improve overall health  2. Reviewed pertinent imaging and records with patient.  3.  She does have a component of cervical dystonia with improvement with botox. Plan for repeat botox injections today  4.  Continue Hydrocodone 7.5/325 mg q 8 h #90. UDS  today  5.  Patient with significant benefit following Cervical MB RFA.  Patient will continue to monitor her progress.  May consider repeat procedure in future if pain returns or worsens.   6. Follow up in 3months for botox

## 2023-09-18 NOTE — PROCEDURES
Botulinum Injection  Location: Neck    Date/Time: 9/18/2023 11:00 AM    Performed by: Levi Robert MD  Authorized by: Levi Robert MD      Consent:      Consent obtained:  Verbal     Consent given by:  Patient     Risks discussed:  Weakness and pain     Alternatives discussed:  No treatment    Universal protocol:      Relevant documents present and verified:  Yes       Site/side verified:  Yes       Immediately prior to procedure a time out was called:  Yes       Patient identity confirmed:  Verbally with patient  Procedure details:      EMG used?:  No     Electrical stimulation used?:  NoNo     Diluted by:  Preservative free saline     Laterality: Bilateral     Toxin (Brand):  [id number]-A (Botox)     Total number of units available:  200     Right splenius cervicis:  50 units divided amongst site(s)     Left splenius cervicis:  50 units divided amongst site(s)     Right upper trapezius:  50 units divided amongst site(s)     Right horizontal trapezius:  50 units divided amongst site(s)       Total units injected:  200     Total units wasted:  0    Medications: 200 Units onabotulinumtoxina 100 unit    Post-procedure details:      Patient tolerance of procedure:  Tolerated well, no immediate complications

## 2023-09-22 LAB
6MAM UR QL: NOT DETECTED
7AMINOCLONAZEPAM UR QL: NOT DETECTED
A-OH ALPRAZ UR QL: NOT DETECTED
ALPHA-OH-MIDAZOLAM: NOT DETECTED
ALPRAZ UR QL: NOT DETECTED
AMPHET UR QL SCN: NOT DETECTED
ANNOTATION COMMENT IMP: NORMAL
ANNOTATION COMMENT IMP: NORMAL
BARBITURATES UR QL: NOT DETECTED
BUPRENORPHINE UR QL: NOT DETECTED
BZE UR QL: NOT DETECTED
CARBOXYTHC UR QL: NOT DETECTED
CARISOPRODOL UR QL: NOT DETECTED
CLONAZEPAM UR QL: NOT DETECTED
CODEINE UR QL: NOT DETECTED
CREAT UR-MCNC: 57 MG/DL (ref 20–400)
DIAZEPAM UR QL: NOT DETECTED
ETHYL GLUCURONIDE UR QL: NOT DETECTED
FENTANYL UR QL: NOT DETECTED
GABAPENTIN: NOT DETECTED
HYDROCODONE UR QL: PRESENT
HYDROMORPHONE UR QL: PRESENT
LORAZEPAM UR QL: NOT DETECTED
MDA UR QL: NOT DETECTED
MDEA UR QL: NOT DETECTED
MDMA UR QL: NOT DETECTED
ME-PHENIDATE UR QL: NOT DETECTED
METHADONE UR QL: NOT DETECTED
METHAMPHET UR QL: NOT DETECTED
MIDAZOLAM UR QL SCN: NOT DETECTED
MORPHINE UR QL: NOT DETECTED
NALOXONE: NOT DETECTED
NORBUPRENORPHINE UR QL CFM: NOT DETECTED
NORDIAZEPAM UR QL: NOT DETECTED
NORFENTANYL UR QL: NOT DETECTED
NORHYDROCODONE UR QL CFM: PRESENT
NORMEPERIDINE UR QL CFM: NOT DETECTED
NOROXYCODONE UR QL CFM: NOT DETECTED
NOROXYMORPHONE UR QL SCN: NOT DETECTED
OXAZEPAM UR QL: NOT DETECTED
OXYCODONE UR QL: NOT DETECTED
OXYMORPHONE UR QL: NOT DETECTED
PATHOLOGY STUDY: NORMAL
PCP UR QL: NOT DETECTED
PHENTERMINE UR QL: NOT DETECTED
PREGABALIN: NOT DETECTED
SERVICE CMNT-IMP: NORMAL
TAPENTADOL UR QL SCN: NOT DETECTED
TAPENTADOL UR QL SCN: NOT DETECTED
TEMAZEPAM UR QL: NOT DETECTED
TRAMADOL UR QL: NOT DETECTED
ZOLPIDEM METABOLITE: NOT DETECTED
ZOLPIDEM UR QL: NOT DETECTED

## 2023-12-18 ENCOUNTER — OFFICE VISIT (OUTPATIENT)
Dept: PAIN MEDICINE | Facility: CLINIC | Age: 63
End: 2023-12-18
Payer: MEDICARE

## 2023-12-18 VITALS
HEIGHT: 61 IN | WEIGHT: 119 LBS | SYSTOLIC BLOOD PRESSURE: 115 MMHG | HEART RATE: 72 BPM | DIASTOLIC BLOOD PRESSURE: 75 MMHG | BODY MASS INDEX: 22.47 KG/M2

## 2023-12-18 DIAGNOSIS — Z79.891 CHRONIC USE OF OPIATE FOR THERAPEUTIC PURPOSE: Primary | ICD-10-CM

## 2023-12-18 DIAGNOSIS — M96.1 CERVICAL POST-LAMINECTOMY SYNDROME: ICD-10-CM

## 2023-12-18 DIAGNOSIS — G24.3 CERVICAL DYSTONIA: ICD-10-CM

## 2023-12-18 DIAGNOSIS — M47.892 OTHER SPONDYLOSIS, CERVICAL REGION: ICD-10-CM

## 2023-12-18 PROCEDURE — 3078F DIAST BP <80 MM HG: CPT | Mod: CPTII,S$GLB,, | Performed by: ANESTHESIOLOGY

## 2023-12-18 PROCEDURE — 3074F SYST BP LT 130 MM HG: CPT | Mod: CPTII,S$GLB,, | Performed by: ANESTHESIOLOGY

## 2023-12-18 PROCEDURE — 3008F BODY MASS INDEX DOCD: CPT | Mod: CPTII,S$GLB,, | Performed by: ANESTHESIOLOGY

## 2023-12-18 PROCEDURE — 3074F PR MOST RECENT SYSTOLIC BLOOD PRESSURE < 130 MM HG: ICD-10-PCS | Mod: CPTII,S$GLB,, | Performed by: ANESTHESIOLOGY

## 2023-12-18 PROCEDURE — 3008F PR BODY MASS INDEX (BMI) DOCUMENTED: ICD-10-PCS | Mod: CPTII,S$GLB,, | Performed by: ANESTHESIOLOGY

## 2023-12-18 PROCEDURE — 99214 PR OFFICE/OUTPT VISIT, EST, LEVL IV, 30-39 MIN: ICD-10-PCS | Mod: S$GLB,,, | Performed by: ANESTHESIOLOGY

## 2023-12-18 PROCEDURE — 99999 PR PBB SHADOW E&M-EST. PATIENT-LVL III: CPT | Mod: PBBFAC,,, | Performed by: ANESTHESIOLOGY

## 2023-12-18 PROCEDURE — 99214 OFFICE O/P EST MOD 30 MIN: CPT | Mod: S$GLB,,, | Performed by: ANESTHESIOLOGY

## 2023-12-18 PROCEDURE — 3078F PR MOST RECENT DIASTOLIC BLOOD PRESSURE < 80 MM HG: ICD-10-PCS | Mod: CPTII,S$GLB,, | Performed by: ANESTHESIOLOGY

## 2023-12-18 PROCEDURE — 99999 PR PBB SHADOW E&M-EST. PATIENT-LVL III: ICD-10-PCS | Mod: PBBFAC,,, | Performed by: ANESTHESIOLOGY

## 2023-12-18 RX ORDER — HYDROCODONE BITARTRATE AND ACETAMINOPHEN 7.5; 325 MG/1; MG/1
1 TABLET ORAL EVERY 8 HOURS PRN
Qty: 90 TABLET | Refills: 0 | Status: SHIPPED | OUTPATIENT
Start: 2024-02-14 | End: 2024-03-18

## 2023-12-18 RX ORDER — HYDROCODONE BITARTRATE AND ACETAMINOPHEN 7.5; 325 MG/1; MG/1
1 TABLET ORAL EVERY 8 HOURS PRN
Qty: 90 TABLET | Refills: 0 | Status: SHIPPED | OUTPATIENT
Start: 2024-01-16 | End: 2024-02-15

## 2023-12-18 RX ORDER — HYDROCODONE BITARTRATE AND ACETAMINOPHEN 7.5; 325 MG/1; MG/1
1 TABLET ORAL EVERY 8 HOURS PRN
Qty: 90 TABLET | Refills: 0 | Status: SHIPPED | OUTPATIENT
Start: 2023-12-18 | End: 2024-01-17

## 2023-12-18 NOTE — PROGRESS NOTES
Referring Physician: Levi Robert MD    PCP: Fabián Darnell Jr., MD      CC:neck and right shoulder pain    Interval history:  Keshia Garza is a 63 y.o. female with chronic neck pain who returns our clinic.  She has difficult history of extensive cervical spine surgery.  Right-sided cervical MB RFA procedure back in June 2023.   Neck pain is improved with increased range of motion. She does have contractures of the neck due to previous surgery.  She is s/p Botox injection treatments for cervical dystonia.  She does state having moderate benefit from the Botox that helps for 3 months.  She is scheduled for repeat botox treatments today  She also Norco 7.5 mg q.8 hours as needed.  No side effects reported.  She denies any worsening weakness.  No bowel bladder changes.    Prior HPI:   Keshia Garza is a 63 y.o. female  referred to us for neck and right shoulder pain.  Patient with extensive history of cervical spine surgery.  She is s/p C4-T4 posterior cervical fusion on 2/1/2016 with subsequent hardware revision on 5/9/2016.  She states this help with her neck and radiating arm pain.  She had onset worsening neck and right shoulder pain over the past year.  No recent traumatic incident.  She has been evaluated by Neurosurgery.  She underwent updated cervical MRI and cervical CT.  She also had EMG study.  Pain is a constant aching, throbbing pain in her neck.  Pain radiates to her right shoulder.  She has numbness tingling down her bilateral arms.  She denies any worsening weakness.  No bowel bladder changes.  She takes NSAIDs with mild benefits.  She currently takes Lyrica with mild benefits as well.  She underwent a cervical LINDA prior to her surgery with minimal benefit.    Interventional History:  S/p cervical MB RFA on right at C4, 5, 6, 7 on 06/2021, 6/2023 with 50% relief    ROS:  CONSTITUTIONAL: No fevers, chills, night sweats, wt. loss, appetite changes  SKIN: no rashes or itching  ENT: No headaches,  "head trauma, vision changes, or eye pain  LYMPH NODES: None noticed   CV: No chest pain, palpitations.   RESP: No shortness of breath, dyspnea on exertion, cough, wheezing, or hemoptysis  GI: No nausea, emesis, diarrhea, constipation, melena, hematochezia, pain.    : No dysuria, hematuria, urgency, or frequency   HEME: No easy bruising, bleeding problems  PSYCHIATRIC: No depression, anxiety, psychosis, hallucinations.  NEURO: No seizures, memory loss, dizziness or difficulty sleeping  MSK:  Positive HPI      Past Medical History:   Diagnosis Date    Benitez esophagus     Bowel obstruction     2013 and had part of small bowel removal from adhesions    Depression     Difficult intravenous access 6/23/2015    Has medardo cath    Encounter for blood transfusion     GERD (gastroesophageal reflux disease)     H. pylori infection     Treated    Hormone replacement therapy (HRT) 6/23/2015    HTN (hypertension) 6/23/2015    Hypertension     Insomnia     Megaloblastic anemia     Pancreatitis     Portacath in place     PUD (peptic ulcer disease)     bleeding ulcer-2012    Stroke     Thyroid disease     Weight loss 6/23/2015     Past Surgical History:   Procedure Laterality Date    ABDOMINAL ADHESION SURGERY      anterior cervical spine fusion      C5-C7 "years ago" (per report in 2015)    Anterior cervical spine surgery  7/9/2015    BACK SURGERY      CHOLEDOCHODUODENOSTOMY      COLONOSCOPY      ERCP      HYSTERECTOMY      INJECTION OF ANESTHETIC AGENT AROUND MEDIAL BRANCH NERVES INNERVATING CERVICAL FACET JOINT Right 1/28/2021    Procedure: Block-nerve-medial branch-cervical;  Surgeon: Levi Robert MD;  [facility];  Service: Pain Management;  Laterality: Right;  C4, 5, 6, 7     INJECTION OF ANESTHETIC AGENT AROUND MEDIAL BRANCH NERVES INNERVATING CERVICAL FACET JOINT Right 2/2/2021    Procedure: Block-nerve-medial branch-cervical;  Surgeon: Levi Robert MD;  [facility];  Service: Pain Management;  Laterality: " "Right;  C4, 5, 6, 7     LIVER BIOPSY      NISSEN FUNDOPLICATION      oral implant      medardo cath      RADIOFREQUENCY ABLATION OF LUMBAR MEDIAL BRANCH NERVE AT SINGLE LEVEL Right 6/18/2021    Procedure: RADIOFREQUENCY ABLATION, NERVE, SPINAL, Cervical;  Surgeon: Levi Robert MD;  Location: [facility] OR;  Service: Pain Management;  Laterality: Right;  C4,5,6,7    RADIOFREQUENCY THERMOCOAGULATION Right 6/28/2023    Procedure: RADIOFREQUENCY THERMAL COAGULATION;  Surgeon: Levi Robert MD;  Location: [facility] OR;  Service: Pain Management;  Laterality: Right;  c4,5,6,7 RFA    SMALL INTESTINE SURGERY      SMALL INTESTINE SURGERY      UPPER GASTROINTESTINAL ENDOSCOPY       Family History   Problem Relation Age of Onset    Cancer Maternal Grandmother 50        colon     Social History     Socioeconomic History    Marital status:    Tobacco Use    Smoking status: Never    Smokeless tobacco: Never   Substance and Sexual Activity    Alcohol use: Yes     Comment: oocasional    Drug use: No    Sexual activity: Never         Medications/Allergies: See med card    Vitals:    12/18/23 1018   BP: 115/75   Pulse: 72   Weight: 54 kg (119 lb)   Height: 5' 1" (1.549 m)   PainSc:   9   PainLoc: Neck         Physical exam:    GENERAL: A and O x3, the patient appears well groomed and is in no acute distress.  Skin: No rashes or obvious lesions  HEENT: normocephalic, atraumatic  CARDIOVASCULAR:  RRR  LUNGS: non labored breathing  ABDOMEN: soft, nontender   UPPER EXTREMITIES: Normal alignment, normal range of motion, no atrophy, no skin changes,  hair growth and nail growth normal and equal bilaterally. No swelling, no tenderness.    LOWER EXTREMITIES:  Normal alignment, normal range of motion, no atrophy, no skin changes,  hair growth and nail growth normal and equal bilaterally. No swelling, no tenderness.  CERVICAL SPINE:  Cervical spine: ROM is very limited flexion, extension and lateral rotation with moderate increased pain.  There is " contracture of the neck due to previous surgery  Spurling's maneuver causes neck pain to right side.  Myofascial exam: moderate Tenderness to palpation across cervical paraspinous region on right  MENTAL STATUS: normal orientation, speech, language, and fund of knowledge for social situation.  Emotional state appropriate.    CRANIAL NERVES:  II:  PERRL bilaterally,   III,IV,VI: EOMI.    V:  Facial sensation equal bilaterally  VII:  Facial motor function normal.  VIII:  Hearing equal to finger rub bilaterally  IX/X: Gag normal, palate symmetric  XI:  Shoulder shrug equal, head turn equal  XII:  Tongue midline without fasciculations      MOTOR: Tone and bulk: normal bilateral upper and lower Strength: normal   Delt Bi Tri WE WF     R 5 5 5 5 5 5   L 5 5 5 5 5 5     IP ADD ABD Quad TA Gas HAM  R 5 5 5 5 5 5 5  L 5 5 5 5 5 5 5    SENSATION: Light touch and pinprick intact bilaterally  REFLEXES: normal, symmetric, nonbrisk.  Toes down, no clonus. No hoffmans.  GAIT: normal rise, base, steps, and arm swing.        Imaging:  MRI C-spine 1/2021  Impression:  1. Stable postsurgical changes of anterior cervical fusion at C3-C7 and posterior instrumented fusion at C4-T4, as described above, without evidence of acute hardware complication.  2. Multilevel degenerative changes of the cervical spine, as described in detail above, not significantly changed in comparison to the prior exam on 10/24/2018    CT C-spine 1/2021  C2-C3: Left asymmetric posterior disc osteophyte complex.  Mild left facet arthropathy.  Mild left neural foraminal stenosis.  There is no spinal canal stenosis.     C3-C4: Moderate left and mild right facet arthropathy.  Mild bilateral uncovertebral joint spurring.  Moderate left and mild-to-moderate right neural foraminal stenosis.  There is no spinal canal stenosis.     C4-C5: Mild posterior osteophyte complex.  Moderate bilateral facet arthropathy.  Mild bilateral uncovertebral spurring.  Mild left neural  foraminal stenosis.  There is no spinal canal stenosis.     C5-C6: Mild bilateral facet arthropathy.  Mild right uncovertebral joint spurring.  Mild right neural foraminal stenosis.  There is no spinal canal stenosis.     C6-C7: Left asymmetric posterior osteophyte complex.  Left facet arthropathy.  Moderate left uncovertebral spurring.  Mild left neural foraminal stenosis.  There is no spinal canal stenosis.     C7-T1: Bilateral uncovertebral spurring and prominent facet arthropathy.  Moderate to severe bilateral neural foraminal stenosis.  There is no spinal canal stenosis.     T1-2: Bilateral uncovertebral spurring and prominent facet arthropathy.  Severe left and moderate right neural foraminal stenosis.     T2-3:.  No significant neural foraminal or spinal canal stenosis.      Assessment:    Keshia Garza is a 63 y.o. female with neck and right shoulder pain  1. Chronic use of opiate for therapeutic purpose    2. Cervical dystonia    3. Cervical post-laminectomy syndrome    4. Other spondylosis, cervical region        Plan:  1. I have stressed the importance of physical activity and exercise to improve overall health  2. Reviewed pertinent imaging and records with patient.  3.  She does have a component of cervical dystonia with improvement with botox. Plan for repeat botox injections today  4.  Continue Hydrocodone 7.5/325 mg q 8 h #90. UDS 9/2023 consistent with use.  5.  Patient with significant benefit following Cervical MB RFA.  Patient will continue to monitor her progress.  May consider repeat procedure in future if pain returns or worsens.   6. Follow up in 3months for botox

## 2023-12-18 NOTE — PROCEDURES
Botulinum Injection  Location: Neck    Date/Time: 12/18/2023 10:30 AM    Performed by: Levi Robert MD  Authorized by: Levi Robert MD      Consent:      Consent obtained:  Verbal     Consent given by:  Patient     Risks discussed:  Pain     Alternatives discussed:  No treatment    Universal protocol:      Relevant documents present and verified:  Yes       Site/side verified:  Yes       Immediately prior to procedure a time out was called:  Yes       Patient identity confirmed:  Verbally with patient  Procedure details:      EMG used?:  No     Electrical stimulation used?:  NoNo     Diluted by:  Preservative free saline     Toxin (Brand):  OnaBoNT-A (Botox)     Total number of units available:  200     Right splenius cervicis:  50 units divided amongst site(s)     Left splenius cervicis:  25 units divided amongst site(s)     Right upper trapezius:  50 units divided amongst site(s)     Left upper trapezius:  25 units divided amongst site(s)     Right horizontal trapezius:  25 units divided amongst site(s)     Left horizontal trapezius:  25 units divided amongst site(s)       Total units injected:  200     Total units wasted:  0    Medications: 200 Units onabotulinumtoxina 100 unit    Post-procedure details:      Patient tolerance of procedure:  Tolerated well, no immediate complications

## 2024-03-18 ENCOUNTER — OFFICE VISIT (OUTPATIENT)
Dept: PAIN MEDICINE | Facility: CLINIC | Age: 64
End: 2024-03-18
Payer: MEDICARE

## 2024-03-18 VITALS
BODY MASS INDEX: 22.48 KG/M2 | DIASTOLIC BLOOD PRESSURE: 78 MMHG | SYSTOLIC BLOOD PRESSURE: 134 MMHG | HEART RATE: 68 BPM | HEIGHT: 61 IN | WEIGHT: 119.06 LBS

## 2024-03-18 DIAGNOSIS — G24.3 CERVICAL DYSTONIA: Primary | ICD-10-CM

## 2024-03-18 DIAGNOSIS — G89.4 CHRONIC PAIN DISORDER: ICD-10-CM

## 2024-03-18 DIAGNOSIS — M96.1 CERVICAL POST-LAMINECTOMY SYNDROME: ICD-10-CM

## 2024-03-18 DIAGNOSIS — M47.892 OTHER SPONDYLOSIS, CERVICAL REGION: ICD-10-CM

## 2024-03-18 PROCEDURE — 3078F DIAST BP <80 MM HG: CPT | Mod: CPTII,S$GLB,, | Performed by: ANESTHESIOLOGY

## 2024-03-18 PROCEDURE — 99214 OFFICE O/P EST MOD 30 MIN: CPT | Mod: S$GLB,,, | Performed by: ANESTHESIOLOGY

## 2024-03-18 PROCEDURE — 3075F SYST BP GE 130 - 139MM HG: CPT | Mod: CPTII,S$GLB,, | Performed by: ANESTHESIOLOGY

## 2024-03-18 PROCEDURE — 3008F BODY MASS INDEX DOCD: CPT | Mod: CPTII,S$GLB,, | Performed by: ANESTHESIOLOGY

## 2024-03-18 PROCEDURE — 3288F FALL RISK ASSESSMENT DOCD: CPT | Mod: CPTII,S$GLB,, | Performed by: ANESTHESIOLOGY

## 2024-03-18 PROCEDURE — 1159F MED LIST DOCD IN RCRD: CPT | Mod: CPTII,S$GLB,, | Performed by: ANESTHESIOLOGY

## 2024-03-18 PROCEDURE — 99999 PR PBB SHADOW E&M-EST. PATIENT-LVL III: CPT | Mod: PBBFAC,,, | Performed by: ANESTHESIOLOGY

## 2024-03-18 PROCEDURE — 1101F PT FALLS ASSESS-DOCD LE1/YR: CPT | Mod: CPTII,S$GLB,, | Performed by: ANESTHESIOLOGY

## 2024-03-18 RX ORDER — HYDROCODONE BITARTRATE AND ACETAMINOPHEN 7.5; 325 MG/1; MG/1
1 TABLET ORAL EVERY 8 HOURS PRN
Qty: 90 TABLET | Refills: 0 | Status: SHIPPED | OUTPATIENT
Start: 2024-04-16 | End: 2024-05-16

## 2024-03-18 RX ORDER — HYDROCODONE BITARTRATE AND ACETAMINOPHEN 7.5; 325 MG/1; MG/1
1 TABLET ORAL EVERY 8 HOURS PRN
Qty: 90 TABLET | Refills: 0 | Status: SHIPPED | OUTPATIENT
Start: 2024-05-15 | End: 2024-06-17 | Stop reason: SDUPTHER

## 2024-03-18 RX ORDER — HYDROCODONE BITARTRATE AND ACETAMINOPHEN 7.5; 325 MG/1; MG/1
1 TABLET ORAL EVERY 8 HOURS PRN
Qty: 90 TABLET | Refills: 0 | Status: SHIPPED | OUTPATIENT
Start: 2024-03-18 | End: 2024-04-17

## 2024-03-18 RX ORDER — HYDROCODONE BITARTRATE AND ACETAMINOPHEN 7.5; 325 MG/1; MG/1
1 TABLET ORAL EVERY 8 HOURS PRN
COMMUNITY
End: 2024-03-18

## 2024-03-18 NOTE — PROGRESS NOTES
Referring Physician: Levi Robert MD    PCP: Fabián Darnell Jr., MD      CC:neck and right shoulder pain    Interval history:  Keshia Garza is a 64 y.o. female with chronic neck pain who returns our clinic.  She has difficult history of extensive cervical spine surgery.  Right-sided cervical MB RFA procedure back in June 2023.   Neck pain is improved with increased range of motion. She does have contractures of the neck due to previous surgery.  She is s/p Botox injection treatments for cervical dystonia.  She does state having moderate benefit from the Botox that helps for 3 months.  She is scheduled for repeat botox treatments today  She also Norco 7.5 mg q.8 hours as needed.  No side effects reported.  She denies any worsening weakness.  No bowel bladder changes.    Prior HPI:   Keshia Garza is a 64 y.o. female  referred to us for neck and right shoulder pain.  Patient with extensive history of cervical spine surgery.  She is s/p C4-T4 posterior cervical fusion on 2/1/2016 with subsequent hardware revision on 5/9/2016.  She states this help with her neck and radiating arm pain.  She had onset worsening neck and right shoulder pain over the past year.  No recent traumatic incident.  She has been evaluated by Neurosurgery.  She underwent updated cervical MRI and cervical CT.  She also had EMG study.  Pain is a constant aching, throbbing pain in her neck.  Pain radiates to her right shoulder.  She has numbness tingling down her bilateral arms.  She denies any worsening weakness.  No bowel bladder changes.  She takes NSAIDs with mild benefits.  She currently takes Lyrica with mild benefits as well.  She underwent a cervical LINDA prior to her surgery with minimal benefit.    Interventional History:  S/p cervical MB RFA on right at C4, 5, 6, 7 on 06/2021, 6/2023 with 50% relief    ROS:  CONSTITUTIONAL: No fevers, chills, night sweats, wt. loss, appetite changes  SKIN: no rashes or itching  ENT: No headaches,  "head trauma, vision changes, or eye pain  LYMPH NODES: None noticed   CV: No chest pain, palpitations.   RESP: No shortness of breath, dyspnea on exertion, cough, wheezing, or hemoptysis  GI: No nausea, emesis, diarrhea, constipation, melena, hematochezia, pain.    : No dysuria, hematuria, urgency, or frequency   HEME: No easy bruising, bleeding problems  PSYCHIATRIC: No depression, anxiety, psychosis, hallucinations.  NEURO: No seizures, memory loss, dizziness or difficulty sleeping  MSK:  Positive HPI      Past Medical History:   Diagnosis Date    Benitez esophagus     Bowel obstruction     2013 and had part of small bowel removal from adhesions    Depression     Difficult intravenous access 6/23/2015    Has medardo cath    Encounter for blood transfusion     GERD (gastroesophageal reflux disease)     H. pylori infection     Treated    Hormone replacement therapy (HRT) 6/23/2015    HTN (hypertension) 6/23/2015    Hypertension     Insomnia     Megaloblastic anemia     Pancreatitis     Portacath in place     PUD (peptic ulcer disease)     bleeding ulcer-2012    Stroke     Thyroid disease     Weight loss 6/23/2015     Past Surgical History:   Procedure Laterality Date    ABDOMINAL ADHESION SURGERY      anterior cervical spine fusion      C5-C7 "years ago" (per report in 2015)    Anterior cervical spine surgery  7/9/2015    BACK SURGERY      CHOLEDOCHODUODENOSTOMY      COLONOSCOPY      ERCP      HYSTERECTOMY      INJECTION OF ANESTHETIC AGENT AROUND MEDIAL BRANCH NERVES INNERVATING CERVICAL FACET JOINT Right 1/28/2021    Procedure: Block-nerve-medial branch-cervical;  Surgeon: Levi Robert MD;  Location: [facility] OR;  Service: Pain Management;  Laterality: Right;  C4, 5, 6, 7     INJECTION OF ANESTHETIC AGENT AROUND MEDIAL BRANCH NERVES INNERVATING CERVICAL FACET JOINT Right 2/2/2021    Procedure: Block-nerve-medial branch-cervical;  Surgeon: Levi Robert MD;  Location: [facility] OR;  Service: Pain Management;  Laterality: " "Right;  C4, 5, 6, 7     LIVER BIOPSY      NISSEN FUNDOPLICATION      oral implant      medardo cath      RADIOFREQUENCY ABLATION OF LUMBAR MEDIAL BRANCH NERVE AT SINGLE LEVEL Right 6/18/2021    Procedure: RADIOFREQUENCY ABLATION, NERVE, SPINAL, Cervical;  Surgeon: Levi Robert MD;  Location[facility] OR;  Service: Pain Management;  Laterality: Right;  C4,5,6,7    RADIOFREQUENCY THERMOCOAGULATION Right 6/28/2023    Procedure: RADIOFREQUENCY THERMAL COAGULATION;  Surgeon: Levi Robert MD;  Location: [facility] OR;  Service: Pain Management;  Laterality: Right;  c4,5,6,7 RFA    SMALL INTESTINE SURGERY      SMALL INTESTINE SURGERY      UPPER GASTROINTESTINAL ENDOSCOPY       Family History   Problem Relation Age of Onset    Cancer Maternal Grandmother 50        colon     Social History     Socioeconomic History    Marital status:    Tobacco Use    Smoking status: Never    Smokeless tobacco: Never   Substance and Sexual Activity    Alcohol use: Yes     Comment: oocasional    Drug use: No    Sexual activity: Never         Medications/Allergies: See med card    Vitals:    03/18/24 1032   BP: 134/78   Pulse: 68   Weight: 54 kg (119 lb 0.8 oz)   Height: 5' 1" (1.549 m)   PainSc: 10-Worst pain ever   PainLoc: Neck         Physical exam:    GENERAL: A and O x3, the patient appears well groomed and is in no acute distress.  Skin: No rashes or obvious lesions  HEENT: normocephalic, atraumatic  CARDIOVASCULAR:  RRR  LUNGS: non labored breathing  ABDOMEN: soft, nontender   UPPER EXTREMITIES: Normal alignment, normal range of motion, no atrophy, no skin changes,  hair growth and nail growth normal and equal bilaterally. No swelling, no tenderness.    LOWER EXTREMITIES:  Normal alignment, normal range of motion, no atrophy, no skin changes,  hair growth and nail growth normal and equal bilaterally. No swelling, no tenderness.  CERVICAL SPINE:  Cervical spine: ROM is very limited flexion, extension and lateral rotation with moderate " increased pain.  There is contracture of the neck due to previous surgery  Spurling's maneuver causes neck pain to right side.  Myofascial exam: moderate Tenderness to palpation across cervical paraspinous region on right  MENTAL STATUS: normal orientation, speech, language, and fund of knowledge for social situation.  Emotional state appropriate.    CRANIAL NERVES:  II:  PERRL bilaterally,   III,IV,VI: EOMI.    V:  Facial sensation equal bilaterally  VII:  Facial motor function normal.  VIII:  Hearing equal to finger rub bilaterally  IX/X: Gag normal, palate symmetric  XI:  Shoulder shrug equal, head turn equal  XII:  Tongue midline without fasciculations      MOTOR: Tone and bulk: normal bilateral upper and lower Strength: normal   Delt Bi Tri WE WF     R 5 5 5 5 5 5   L 5 5 5 5 5 5     IP ADD ABD Quad TA Gas HAM  R 5 5 5 5 5 5 5  L 5 5 5 5 5 5 5    SENSATION: Light touch and pinprick intact bilaterally  REFLEXES: normal, symmetric, nonbrisk.  Toes down, no clonus. No hoffmans.  GAIT: normal rise, base, steps, and arm swing.        Imaging:  MRI C-spine 1/2021  Impression:  1. Stable postsurgical changes of anterior cervical fusion at C3-C7 and posterior instrumented fusion at C4-T4, as described above, without evidence of acute hardware complication.  2. Multilevel degenerative changes of the cervical spine, as described in detail above, not significantly changed in comparison to the prior exam on 10/24/2018    CT C-spine 1/2021  C2-C3: Left asymmetric posterior disc osteophyte complex.  Mild left facet arthropathy.  Mild left neural foraminal stenosis.  There is no spinal canal stenosis.     C3-C4: Moderate left and mild right facet arthropathy.  Mild bilateral uncovertebral joint spurring.  Moderate left and mild-to-moderate right neural foraminal stenosis.  There is no spinal canal stenosis.     C4-C5: Mild posterior osteophyte complex.  Moderate bilateral facet arthropathy.  Mild bilateral uncovertebral  spurring.  Mild left neural foraminal stenosis.  There is no spinal canal stenosis.     C5-C6: Mild bilateral facet arthropathy.  Mild right uncovertebral joint spurring.  Mild right neural foraminal stenosis.  There is no spinal canal stenosis.     C6-C7: Left asymmetric posterior osteophyte complex.  Left facet arthropathy.  Moderate left uncovertebral spurring.  Mild left neural foraminal stenosis.  There is no spinal canal stenosis.     C7-T1: Bilateral uncovertebral spurring and prominent facet arthropathy.  Moderate to severe bilateral neural foraminal stenosis.  There is no spinal canal stenosis.     T1-2: Bilateral uncovertebral spurring and prominent facet arthropathy.  Severe left and moderate right neural foraminal stenosis.     T2-3:.  No significant neural foraminal or spinal canal stenosis.      Assessment:    Keshia Garza is a 64 y.o. female with neck and right shoulder pain  1. Cervical dystonia    2. Cervical post-laminectomy syndrome    3. Other spondylosis, cervical region    4. Chronic pain disorder        Plan:  1. I have stressed the importance of physical activity and exercise to improve overall health  2. Reviewed pertinent imaging and records with patient.  3.  She does have a component of cervical dystonia with improvement with botox. Plan for repeat botox injections today  4.  Continue Hydrocodone 7.5/325 mg q 8 h #90. UDS 9/2023 consistent with use.  5.  Patient with significant benefit following Cervical MB RFA.  Patient will continue to monitor her progress.  May consider repeat procedure in future if pain returns or worsens.   6. Follow up in 3months for botox

## 2024-03-18 NOTE — PROCEDURES
Botulinum Injection  Location: Neck    Date/Time: 3/18/2024 11:00 AM    Performed by: Levi Robert MD  Authorized by: Levi Robert MD      Consent:      Consent obtained:  Verbal     Consent given by:  Patient     Risks discussed:  Pain     Alternatives discussed:  No treatment    Universal protocol:      Relevant documents present and verified:  Yes       Site/side verified:  Yes       Immediately prior to procedure a time out was called:  Yes       Patient identity confirmed:  Verbally with patient  Procedure details:      EMG used?:  No     Electrical stimulation used?:  NoNo     Diluted by:  Preservative free saline     Toxin (Brand):  OnaBoNT-A (Botox)     Total number of units available:  200     Right splenius cervicis:  25 units divided amongst site(s)     Left splenius cervicis:  50 units divided amongst site(s)     Right upper trapezius:  25 units divided amongst site(s)     Left upper trapezius:  50 units divided amongst site(s)     Right horizontal trapezius:  0 units divided amongst site(s)     Left horizontal trapezius:  50 units divided amongst site(s)       Total units injected:  200     Total units wasted:  0    Medications: 200 Units onabotulinumtoxina 100 unit    Post-procedure details:      Patient tolerance of procedure:  Tolerated well, no immediate complications

## 2024-06-17 ENCOUNTER — OFFICE VISIT (OUTPATIENT)
Dept: PAIN MEDICINE | Facility: CLINIC | Age: 64
End: 2024-06-17
Payer: MEDICARE

## 2024-06-17 VITALS
HEART RATE: 70 BPM | WEIGHT: 119.06 LBS | HEIGHT: 61 IN | BODY MASS INDEX: 22.48 KG/M2 | SYSTOLIC BLOOD PRESSURE: 110 MMHG | DIASTOLIC BLOOD PRESSURE: 71 MMHG

## 2024-06-17 DIAGNOSIS — M96.1 CERVICAL POST-LAMINECTOMY SYNDROME: ICD-10-CM

## 2024-06-17 DIAGNOSIS — G24.3 CERVICAL DYSTONIA: Primary | ICD-10-CM

## 2024-06-17 DIAGNOSIS — M47.892 OTHER SPONDYLOSIS, CERVICAL REGION: ICD-10-CM

## 2024-06-17 PROCEDURE — 1101F PT FALLS ASSESS-DOCD LE1/YR: CPT | Mod: CPTII,S$GLB,, | Performed by: ANESTHESIOLOGY

## 2024-06-17 PROCEDURE — 99214 OFFICE O/P EST MOD 30 MIN: CPT | Mod: S$GLB,,, | Performed by: ANESTHESIOLOGY

## 2024-06-17 PROCEDURE — 3078F DIAST BP <80 MM HG: CPT | Mod: CPTII,S$GLB,, | Performed by: ANESTHESIOLOGY

## 2024-06-17 PROCEDURE — 3288F FALL RISK ASSESSMENT DOCD: CPT | Mod: CPTII,S$GLB,, | Performed by: ANESTHESIOLOGY

## 2024-06-17 PROCEDURE — 99999 PR PBB SHADOW E&M-EST. PATIENT-LVL III: CPT | Mod: PBBFAC,,, | Performed by: ANESTHESIOLOGY

## 2024-06-17 PROCEDURE — 3074F SYST BP LT 130 MM HG: CPT | Mod: CPTII,S$GLB,, | Performed by: ANESTHESIOLOGY

## 2024-06-17 PROCEDURE — 3008F BODY MASS INDEX DOCD: CPT | Mod: CPTII,S$GLB,, | Performed by: ANESTHESIOLOGY

## 2024-06-17 RX ORDER — HYDROCODONE BITARTRATE AND ACETAMINOPHEN 7.5; 325 MG/1; MG/1
1 TABLET ORAL EVERY 8 HOURS PRN
Qty: 90 TABLET | Refills: 0 | Status: SHIPPED | OUTPATIENT
Start: 2024-07-16 | End: 2024-08-15

## 2024-06-17 RX ORDER — HYDROCODONE BITARTRATE AND ACETAMINOPHEN 7.5; 325 MG/1; MG/1
1 TABLET ORAL EVERY 8 HOURS PRN
Qty: 90 TABLET | Refills: 0 | Status: SHIPPED | OUTPATIENT
Start: 2024-08-14 | End: 2024-09-13

## 2024-06-17 RX ORDER — HYDROCODONE BITARTRATE AND ACETAMINOPHEN 7.5; 325 MG/1; MG/1
1 TABLET ORAL EVERY 8 HOURS PRN
Qty: 90 TABLET | Refills: 0 | Status: SHIPPED | OUTPATIENT
Start: 2024-06-17 | End: 2024-07-17

## 2024-06-17 NOTE — PROCEDURES
Botulinum Injection  Location: Neck    Date/Time: 6/17/2024 3:30 PM    Performed by: Levi Robert MD  Authorized by: Levi Robert MD      Consent:      Consent obtained:  Verbal     Consent given by:  Patient     Risks discussed:  Pain     Alternatives discussed:  No treatment    Universal protocol:      Relevant documents present and verified:  Yes       Site/side verified:  Yes       Immediately prior to procedure a time out was called:  Yes       Patient identity confirmed:  Verbally with patient  Procedure details:      EMG used?:  No     Electrical stimulation used?:  NoNo     Diluted by:  Preservative free saline     Toxin (Brand):  OnaBoNT-A (Botox)     Total number of units available:  200     Right splenius cervicis:  50 units divided amongst site(s)     Left splenius cervicis:  50 units divided amongst site(s)     Right upper trapezius:  25 units divided amongst site(s)     Left upper trapezius:  25 units divided amongst site(s)     Right horizontal trapezius:  25 units divided amongst site(s)     Left horizontal trapezius:  25 units divided amongst site(s)       Total units injected:  200     Total units wasted:  0    Medications: 200 Units onabotulinumtoxina 100 unit    Post-procedure details:      Patient tolerance of procedure:  Tolerated well, no immediate complications

## 2024-06-17 NOTE — PROGRESS NOTES
Referring Physician: Levi Robert MD    PCP: Fabián Darnell Jr., MD      CC:neck and right shoulder pain    Interval history:  Keshia Garza is a 64 y.o. female with chronic neck pain who returns our clinic.  She has difficult history of extensive cervical spine surgery.  Right-sided cervical MB RFA procedure back in June 2023.   Neck pain is improved with increased range of motion. She does have contractures of the neck due to previous surgery.  She is s/p Botox injection treatments for cervical dystonia.  She does state having moderate benefit from the Botox that helps for 3 months.  She is scheduled for repeat botox treatments today  She also Norco 7.5 mg q.8 hours as needed.  No side effects reported.  She denies any worsening weakness.  No bowel bladder changes.    Prior HPI:   Keshia Garza is a 64 y.o. female  referred to us for neck and right shoulder pain.  Patient with extensive history of cervical spine surgery.  She is s/p C4-T4 posterior cervical fusion on 2/1/2016 with subsequent hardware revision on 5/9/2016.  She states this help with her neck and radiating arm pain.  She had onset worsening neck and right shoulder pain over the past year.  No recent traumatic incident.  She has been evaluated by Neurosurgery.  She underwent updated cervical MRI and cervical CT.  She also had EMG study.  Pain is a constant aching, throbbing pain in her neck.  Pain radiates to her right shoulder.  She has numbness tingling down her bilateral arms.  She denies any worsening weakness.  No bowel bladder changes.  She takes NSAIDs with mild benefits.  She currently takes Lyrica with mild benefits as well.  She underwent a cervical LINDA prior to her surgery with minimal benefit.    Interventional History:  S/p cervical MB RFA on right at C4, 5, 6, 7 on 06/2021, 6/2023 with 50% relief    ROS:  CONSTITUTIONAL: No fevers, chills, night sweats, wt. loss, appetite changes  SKIN: no rashes or itching  ENT: No headaches,  "head trauma, vision changes, or eye pain  LYMPH NODES: None noticed   CV: No chest pain, palpitations.   RESP: No shortness of breath, dyspnea on exertion, cough, wheezing, or hemoptysis  GI: No nausea, emesis, diarrhea, constipation, melena, hematochezia, pain.    : No dysuria, hematuria, urgency, or frequency   HEME: No easy bruising, bleeding problems  PSYCHIATRIC: No depression, anxiety, psychosis, hallucinations.  NEURO: No seizures, memory loss, dizziness or difficulty sleeping  MSK:  Positive HPI      Past Medical History:   Diagnosis Date    Benitez esophagus     Bowel obstruction     2013 and had part of small bowel removal from adhesions    Depression     Difficult intravenous access 6/23/2015    Has medardo cath    Encounter for blood transfusion     GERD (gastroesophageal reflux disease)     H. pylori infection     Treated    Hormone replacement therapy (HRT) 6/23/2015    HTN (hypertension) 6/23/2015    Hypertension     Insomnia     Megaloblastic anemia     Pancreatitis     Portacath in place     PUD (peptic ulcer disease)     bleeding ulcer-2012    Stroke     Thyroid disease     Weight loss 6/23/2015     Past Surgical History:   Procedure Laterality Date    ABDOMINAL ADHESION SURGERY      anterior cervical spine fusion      C5-C7 "years ago" (per report in 2015)    Anterior cervical spine surgery  7/9/2015    BACK SURGERY      CHOLEDOCHODUODENOSTOMY      COLONOSCOPY      ERCP      HYSTERECTOMY      INJECTION OF ANESTHETIC AGENT AROUND MEDIAL BRANCH NERVES INNERVATING CERVICAL FACET JOINT Right 1/28/2021    Procedure: Block-nerve-medial branch-cervical;  Surgeon: Levi Robert MD;  Location: [facility] OR;  Service: Pain Management;  Laterality: Right;  C4, 5, 6, 7     INJECTION OF ANESTHETIC AGENT AROUND MEDIAL BRANCH NERVES INNERVATING CERVICAL FACET JOINT Right 2/2/2021    Procedure: Block-nerve-medial branch-cervical;  Surgeon: Levi Robert MD;  Location: [facility] OR;  Service: Pain Management;  Laterality: " "Right;  C4, 5, 6, 7     LIVER BIOPSY      NISSEN FUNDOPLICATION      oral implant      medardo cath      RADIOFREQUENCY ABLATION OF LUMBAR MEDIAL BRANCH NERVE AT SINGLE LEVEL Right 6/18/2021    Procedure: RADIOFREQUENCY ABLATION, NERVE, SPINAL, Cervical;  Surgeon: Levi Robert MD;  Location: [facility] OR;  Service: Pain Management;  Laterality: Right;  C4,5,6,7    RADIOFREQUENCY THERMOCOAGULATION Right 6/28/2023    Procedure: RADIOFREQUENCY THERMAL COAGULATION;  Surgeon: Levi Robert MD;  Location: [facility] OR;  Service: Pain Management;  Laterality: Right;  c4,5,6,7 RFA    SMALL INTESTINE SURGERY      SMALL INTESTINE SURGERY      UPPER GASTROINTESTINAL ENDOSCOPY       Family History   Problem Relation Name Age of Onset    Cancer Maternal Grandmother  50        colon     Social History     Socioeconomic History    Marital status:    Tobacco Use    Smoking status: Never    Smokeless tobacco: Never   Substance and Sexual Activity    Alcohol use: Yes     Comment: oocasional    Drug use: No    Sexual activity: Never         Medications/Allergies: See med card    Vitals:    06/17/24 0847   BP: 110/71   Pulse: 70   Weight: 54 kg (119 lb 0.8 oz)   Height: 5' 1" (1.549 m)   PainSc:   8   PainLoc: Back         Physical exam:    GENERAL: A and O x3, the patient appears well groomed and is in no acute distress.  Skin: No rashes or obvious lesions  HEENT: normocephalic, atraumatic  CARDIOVASCULAR:  RRR  LUNGS: non labored breathing  ABDOMEN: soft, nontender   UPPER EXTREMITIES: Normal alignment, normal range of motion, no atrophy, no skin changes,  hair growth and nail growth normal and equal bilaterally. No swelling, no tenderness.    LOWER EXTREMITIES:  Normal alignment, normal range of motion, no atrophy, no skin changes,  hair growth and nail growth normal and equal bilaterally. No swelling, no tenderness.  CERVICAL SPINE:  Cervical spine: ROM is very limited flexion, extension and lateral rotation with moderate increased pain.  " There is contracture of the neck due to previous surgery  Spurling's maneuver causes neck pain to right side.  Myofascial exam: moderate Tenderness to palpation across cervical paraspinous region on right  MENTAL STATUS: normal orientation, speech, language, and fund of knowledge for social situation.  Emotional state appropriate.    CRANIAL NERVES:  II:  PERRL bilaterally,   III,IV,VI: EOMI.    V:  Facial sensation equal bilaterally  VII:  Facial motor function normal.  VIII:  Hearing equal to finger rub bilaterally  IX/X: Gag normal, palate symmetric  XI:  Shoulder shrug equal, head turn equal  XII:  Tongue midline without fasciculations      MOTOR: Tone and bulk: normal bilateral upper and lower Strength: normal   Delt Bi Tri WE WF     R 5 5 5 5 5 5   L 5 5 5 5 5 5     IP ADD ABD Quad TA Gas HAM  R 5 5 5 5 5 5 5  L 5 5 5 5 5 5 5    SENSATION: Light touch and pinprick intact bilaterally  REFLEXES: normal, symmetric, nonbrisk.  Toes down, no clonus. No hoffmans.  GAIT: normal rise, base, steps, and arm swing.        Imaging:  MRI C-spine 1/2021  Impression:  1. Stable postsurgical changes of anterior cervical fusion at C3-C7 and posterior instrumented fusion at C4-T4, as described above, without evidence of acute hardware complication.  2. Multilevel degenerative changes of the cervical spine, as described in detail above, not significantly changed in comparison to the prior exam on 10/24/2018    CT C-spine 1/2021  C2-C3: Left asymmetric posterior disc osteophyte complex.  Mild left facet arthropathy.  Mild left neural foraminal stenosis.  There is no spinal canal stenosis.     C3-C4: Moderate left and mild right facet arthropathy.  Mild bilateral uncovertebral joint spurring.  Moderate left and mild-to-moderate right neural foraminal stenosis.  There is no spinal canal stenosis.     C4-C5: Mild posterior osteophyte complex.  Moderate bilateral facet arthropathy.  Mild bilateral uncovertebral spurring.  Mild  left neural foraminal stenosis.  There is no spinal canal stenosis.     C5-C6: Mild bilateral facet arthropathy.  Mild right uncovertebral joint spurring.  Mild right neural foraminal stenosis.  There is no spinal canal stenosis.     C6-C7: Left asymmetric posterior osteophyte complex.  Left facet arthropathy.  Moderate left uncovertebral spurring.  Mild left neural foraminal stenosis.  There is no spinal canal stenosis.     C7-T1: Bilateral uncovertebral spurring and prominent facet arthropathy.  Moderate to severe bilateral neural foraminal stenosis.  There is no spinal canal stenosis.     T1-2: Bilateral uncovertebral spurring and prominent facet arthropathy.  Severe left and moderate right neural foraminal stenosis.     T2-3:.  No significant neural foraminal or spinal canal stenosis.      Assessment:    Keshia Garza is a 64 y.o. female with neck and right shoulder pain  1. Cervical dystonia    2. Cervical post-laminectomy syndrome    3. Other spondylosis, cervical region        Plan:  1. I have stressed the importance of physical activity and exercise to improve overall health  2. Reviewed pertinent imaging and records with patient.  3.  She does have a component of cervical dystonia with improvement with botox. Plan for repeat botox injections today  4.  Continue Hydrocodone 7.5/325 mg q 8 h #90. UDS next visit  5.  Patient with significant benefit following Cervical MB RFA.  Patient will continue to monitor her progress.  May consider repeat procedure in future if pain returns or worsens.   6. Follow up in 3months for botox

## 2024-09-16 ENCOUNTER — OFFICE VISIT (OUTPATIENT)
Dept: PAIN MEDICINE | Facility: CLINIC | Age: 64
End: 2024-09-16
Payer: MEDICARE

## 2024-09-16 VITALS
BODY MASS INDEX: 22.48 KG/M2 | HEART RATE: 72 BPM | WEIGHT: 119.06 LBS | HEIGHT: 61 IN | DIASTOLIC BLOOD PRESSURE: 71 MMHG | SYSTOLIC BLOOD PRESSURE: 105 MMHG

## 2024-09-16 DIAGNOSIS — G89.4 CHRONIC PAIN DISORDER: ICD-10-CM

## 2024-09-16 DIAGNOSIS — M96.1 CERVICAL POST-LAMINECTOMY SYNDROME: ICD-10-CM

## 2024-09-16 DIAGNOSIS — M47.812 CERVICAL SPONDYLOSIS: ICD-10-CM

## 2024-09-16 DIAGNOSIS — G24.3 CERVICAL DYSTONIA: ICD-10-CM

## 2024-09-16 PROCEDURE — 99999 PR PBB SHADOW E&M-EST. PATIENT-LVL III: CPT | Mod: PBBFAC,,, | Performed by: ANESTHESIOLOGY

## 2024-09-16 PROCEDURE — 80355 GABAPENTIN NON-BLOOD: CPT | Performed by: ANESTHESIOLOGY

## 2024-09-16 PROCEDURE — 80307 DRUG TEST PRSMV CHEM ANLYZR: CPT | Performed by: ANESTHESIOLOGY

## 2024-09-16 PROCEDURE — 80347 BENZODIAZEPINES 13 OR MORE: CPT | Performed by: ANESTHESIOLOGY

## 2024-09-16 PROCEDURE — 80364 OPIOID &OPIATE ANALOG 5/MORE: CPT | Performed by: ANESTHESIOLOGY

## 2024-09-16 RX ORDER — HYDROCODONE BITARTRATE AND ACETAMINOPHEN 7.5; 325 MG/1; MG/1
1 TABLET ORAL EVERY 8 HOURS PRN
Qty: 90 TABLET | Refills: 0 | Status: SHIPPED | OUTPATIENT
Start: 2024-10-15 | End: 2024-11-14

## 2024-09-16 RX ORDER — HYDROCODONE BITARTRATE AND ACETAMINOPHEN 7.5; 325 MG/1; MG/1
1 TABLET ORAL EVERY 8 HOURS PRN
Qty: 90 TABLET | Refills: 0 | Status: SHIPPED | OUTPATIENT
Start: 2024-09-16 | End: 2024-10-16

## 2024-09-16 RX ORDER — HYDROCODONE BITARTRATE AND ACETAMINOPHEN 7.5; 325 MG/1; MG/1
1 TABLET ORAL EVERY 8 HOURS PRN
Qty: 90 TABLET | Refills: 0 | Status: SHIPPED | OUTPATIENT
Start: 2024-11-13 | End: 2024-12-13

## 2024-09-16 NOTE — PROGRESS NOTES
Referring Physician: Levi Robert MD    PCP: Fabián Darnell Jr., MD      CC:neck and right shoulder pain    Interval history:  Keshia Garza is a 64 y.o. female with chronic neck pain who returns our clinic.  She has difficult history of extensive cervical spine surgery.  Right-sided cervical MB RFA procedure back in June 2023.   Neck pain is improved with increased range of motion. She does have contractures of the neck due to previous surgery.  She is s/p Botox injection treatments for cervical dystonia.  She does state having moderate benefit from the Botox that helps for 3 months.  She is scheduled for repeat botox treatments today  She also Norco 7.5 mg q.8 hours as needed.  No side effects reported.  She denies any worsening weakness.  No bowel bladder changes.    Prior HPI:   Keshia Garza is a 64 y.o. female  referred to us for neck and right shoulder pain.  Patient with extensive history of cervical spine surgery.  She is s/p C4-T4 posterior cervical fusion on 2/1/2016 with subsequent hardware revision on 5/9/2016.  She states this help with her neck and radiating arm pain.  She had onset worsening neck and right shoulder pain over the past year.  No recent traumatic incident.  She has been evaluated by Neurosurgery.  She underwent updated cervical MRI and cervical CT.  She also had EMG study.  Pain is a constant aching, throbbing pain in her neck.  Pain radiates to her right shoulder.  She has numbness tingling down her bilateral arms.  She denies any worsening weakness.  No bowel bladder changes.  She takes NSAIDs with mild benefits.  She currently takes Lyrica with mild benefits as well.  She underwent a cervical LINDA prior to her surgery with minimal benefit.    Interventional History:  S/p cervical MB RFA on right at C4, 5, 6, 7 on 06/2021, 6/2023 with 50% relief    ROS:  CONSTITUTIONAL: No fevers, chills, night sweats, wt. loss, appetite changes  SKIN: no rashes or itching  ENT: No headaches,  "head trauma, vision changes, or eye pain  LYMPH NODES: None noticed   CV: No chest pain, palpitations.   RESP: No shortness of breath, dyspnea on exertion, cough, wheezing, or hemoptysis  GI: No nausea, emesis, diarrhea, constipation, melena, hematochezia, pain.    : No dysuria, hematuria, urgency, or frequency   HEME: No easy bruising, bleeding problems  PSYCHIATRIC: No depression, anxiety, psychosis, hallucinations.  NEURO: No seizures, memory loss, dizziness or difficulty sleeping  MSK:  Positive HPI      Past Medical History:   Diagnosis Date    Benitez esophagus     Bowel obstruction     2013 and had part of small bowel removal from adhesions    Depression     Difficult intravenous access 6/23/2015    Has medardo cath    Encounter for blood transfusion     GERD (gastroesophageal reflux disease)     H. pylori infection     Treated    Hormone replacement therapy (HRT) 6/23/2015    HTN (hypertension) 6/23/2015    Hypertension     Insomnia     Megaloblastic anemia     Pancreatitis     Portacath in place     PUD (peptic ulcer disease)     bleeding ulcer-2012    Stroke     Thyroid disease     Weight loss 6/23/2015     Past Surgical History:   Procedure Laterality Date    ABDOMINAL ADHESION SURGERY      anterior cervical spine fusion      C5-C7 "years ago" (per report in 2015)    Anterior cervical spine surgery  7/9/2015    BACK SURGERY      CHOLEDOCHODUODENOSTOMY      COLONOSCOPY      ERCP      HYSTERECTOMY      INJECTION OF ANESTHETIC AGENT AROUND MEDIAL BRANCH NERVES INNERVATING CERVICAL FACET JOINT Right 1/28/2021    Procedure: Block-nerve-medial branch-cervical;  Surgeon: Levi Robert MD;  Location: [facility] OR;  Service: Pain Management;  Laterality: Right;  C4, 5, 6, 7     INJECTION OF ANESTHETIC AGENT AROUND MEDIAL BRANCH NERVES INNERVATING CERVICAL FACET JOINT Right 2/2/2021    Procedure: Block-nerve-medial branch-cervical;  Surgeon: Levi Robert MD;  Location: [facility] OR;  Service: Pain Management;  Laterality: " "Right;  C4, 5, 6, 7     LIVER BIOPSY      NISSEN FUNDOPLICATION      oral implant      medardo cath      RADIOFREQUENCY ABLATION OF LUMBAR MEDIAL BRANCH NERVE AT SINGLE LEVEL Right 6/18/2021    Procedure: RADIOFREQUENCY ABLATION, NERVE, SPINAL, Cervical;  Surgeon: Levi Robert MD;  [facility] OR;  Service: Pain Management;  Laterality: Right;  C4,5,6,7    RADIOFREQUENCY THERMOCOAGULATION Right 6/28/2023    Procedure: RADIOFREQUENCY THERMAL COAGULATION;  Surgeon: Levi Robert MD;  Location: [facility] OR;  Service: Pain Management;  Laterality: Right;  c4,5,6,7 RFA    SMALL INTESTINE SURGERY      SMALL INTESTINE SURGERY      UPPER GASTROINTESTINAL ENDOSCOPY       Family History   Problem Relation Name Age of Onset    Cancer Maternal Grandmother  50        colon     Social History     Socioeconomic History    Marital status:    Tobacco Use    Smoking status: Never    Smokeless tobacco: Never   Substance and Sexual Activity    Alcohol use: Yes     Comment: oocasional    Drug use: No    Sexual activity: Never         Medications/Allergies: See med card    Vitals:    09/16/24 0945   BP: 105/71   Pulse: 72   Weight: 54 kg (119 lb 0.8 oz)   Height: 5' 1" (1.549 m)   PainSc:   7   PainLoc: Neck         Physical exam:    GENERAL: A and O x3, the patient appears well groomed and is in no acute distress.  Skin: No rashes or obvious lesions  HEENT: normocephalic, atraumatic  CARDIOVASCULAR:  RRR  LUNGS: non labored breathing  ABDOMEN: soft, nontender   UPPER EXTREMITIES: Normal alignment, normal range of motion, no atrophy, no skin changes,  hair growth and nail growth normal and equal bilaterally. No swelling, no tenderness.    LOWER EXTREMITIES:  Normal alignment, normal range of motion, no atrophy, no skin changes,  hair growth and nail growth normal and equal bilaterally. No swelling, no tenderness.  CERVICAL SPINE:  Cervical spine: ROM is very limited flexion, extension and lateral rotation with moderate increased pain.  " There is contracture of the neck due to previous surgery  Spurling's maneuver causes neck pain to right side.  Myofascial exam: moderate Tenderness to palpation across cervical paraspinous region on right  MENTAL STATUS: normal orientation, speech, language, and fund of knowledge for social situation.  Emotional state appropriate.    CRANIAL NERVES:  II:  PERRL bilaterally,   III,IV,VI: EOMI.    V:  Facial sensation equal bilaterally  VII:  Facial motor function normal.  VIII:  Hearing equal to finger rub bilaterally  IX/X: Gag normal, palate symmetric  XI:  Shoulder shrug equal, head turn equal  XII:  Tongue midline without fasciculations      MOTOR: Tone and bulk: normal bilateral upper and lower Strength: normal   Delt Bi Tri WE WF     R 5 5 5 5 5 5   L 5 5 5 5 5 5     IP ADD ABD Quad TA Gas HAM  R 5 5 5 5 5 5 5  L 5 5 5 5 5 5 5    SENSATION: Light touch and pinprick intact bilaterally  REFLEXES: normal, symmetric, nonbrisk.  Toes down, no clonus. No hoffmans.  GAIT: normal rise, base, steps, and arm swing.        Imaging:  MRI C-spine 1/2021  Impression:  1. Stable postsurgical changes of anterior cervical fusion at C3-C7 and posterior instrumented fusion at C4-T4, as described above, without evidence of acute hardware complication.  2. Multilevel degenerative changes of the cervical spine, as described in detail above, not significantly changed in comparison to the prior exam on 10/24/2018    CT C-spine 1/2021  C2-C3: Left asymmetric posterior disc osteophyte complex.  Mild left facet arthropathy.  Mild left neural foraminal stenosis.  There is no spinal canal stenosis.     C3-C4: Moderate left and mild right facet arthropathy.  Mild bilateral uncovertebral joint spurring.  Moderate left and mild-to-moderate right neural foraminal stenosis.  There is no spinal canal stenosis.     C4-C5: Mild posterior osteophyte complex.  Moderate bilateral facet arthropathy.  Mild bilateral uncovertebral spurring.  Mild  left neural foraminal stenosis.  There is no spinal canal stenosis.     C5-C6: Mild bilateral facet arthropathy.  Mild right uncovertebral joint spurring.  Mild right neural foraminal stenosis.  There is no spinal canal stenosis.     C6-C7: Left asymmetric posterior osteophyte complex.  Left facet arthropathy.  Moderate left uncovertebral spurring.  Mild left neural foraminal stenosis.  There is no spinal canal stenosis.     C7-T1: Bilateral uncovertebral spurring and prominent facet arthropathy.  Moderate to severe bilateral neural foraminal stenosis.  There is no spinal canal stenosis.     T1-2: Bilateral uncovertebral spurring and prominent facet arthropathy.  Severe left and moderate right neural foraminal stenosis.     T2-3:.  No significant neural foraminal or spinal canal stenosis.      Assessment:    Keshia Garza is a 64 y.o. female with neck and right shoulder pain  1. Cervical dystonia    2. Cervical post-laminectomy syndrome        Plan:  1. I have stressed the importance of physical activity and exercise to improve overall health  2. Reviewed pertinent imaging and records with patient.  3.  She does have a component of cervical dystonia with improvement with botox. Plan for repeat botox injections today  4.  Continue Hydrocodone 7.5/325 mg q 8 h #90. UDS 9/2024  5.  Patient with significant benefit following Cervical MB RFA.  Patient will continue to monitor her progress.  May consider repeat procedure in future if pain returns or worsens.   6. Follow up in 3months for botox

## 2024-09-16 NOTE — PROCEDURES
Botulinum Injection  Location: Neck    Date/Time: 9/16/2024 3:30 PM    Performed by: Levi Robert MD  Authorized by: Levi Robert MD      Consent:      Consent obtained:  Verbal     Consent given by:  Patient     Risks discussed:  Pain     Alternatives discussed:  No treatment    Universal protocol:      Relevant documents present and verified:  Yes       Site/side verified:  Yes       Immediately prior to procedure a time out was called:  Yes       Patient identity confirmed:  Verbally with patient  Procedure details:      EMG used?:  No     Electrical stimulation used?:  NoNo     Diluted by:  Preservative free saline     Toxin (Brand):  OnaBoNT-A (Botox)     Total number of units available:  200     Right splenius cervicis:  50 units divided amongst site(s)     Left splenius cervicis:  50 units divided amongst site(s)     Right upper trapezius:  25 units divided amongst site(s)     Left upper trapezius:  25 units divided amongst site(s)     Right horizontal trapezius:  25 units divided amongst site(s)     Left horizontal trapezius:  25 units divided amongst site(s)       Total units injected:  200     Total units wasted:  0    Medications: 200 Units onabotulinumtoxina 100 unit    Post-procedure details:      Patient tolerance of procedure:  Tolerated well, no immediate complications

## 2024-12-16 ENCOUNTER — OFFICE VISIT (OUTPATIENT)
Dept: PAIN MEDICINE | Facility: CLINIC | Age: 64
End: 2024-12-16
Payer: MEDICARE

## 2024-12-16 VITALS
BODY MASS INDEX: 22.48 KG/M2 | DIASTOLIC BLOOD PRESSURE: 77 MMHG | HEIGHT: 61 IN | SYSTOLIC BLOOD PRESSURE: 117 MMHG | HEART RATE: 81 BPM | WEIGHT: 119.06 LBS

## 2024-12-16 DIAGNOSIS — M47.892 OTHER SPONDYLOSIS, CERVICAL REGION: ICD-10-CM

## 2024-12-16 DIAGNOSIS — G89.4 CHRONIC PAIN DISORDER: ICD-10-CM

## 2024-12-16 DIAGNOSIS — G24.3 CERVICAL DYSTONIA: Primary | ICD-10-CM

## 2024-12-16 DIAGNOSIS — M96.1 CERVICAL POST-LAMINECTOMY SYNDROME: ICD-10-CM

## 2024-12-16 PROCEDURE — 99999 PR PBB SHADOW E&M-EST. PATIENT-LVL III: CPT | Mod: PBBFAC,,, | Performed by: ANESTHESIOLOGY

## 2024-12-16 PROCEDURE — 3074F SYST BP LT 130 MM HG: CPT | Mod: CPTII,S$GLB,, | Performed by: ANESTHESIOLOGY

## 2024-12-16 PROCEDURE — 99214 OFFICE O/P EST MOD 30 MIN: CPT | Mod: S$GLB,,, | Performed by: ANESTHESIOLOGY

## 2024-12-16 PROCEDURE — 3008F BODY MASS INDEX DOCD: CPT | Mod: CPTII,S$GLB,, | Performed by: ANESTHESIOLOGY

## 2024-12-16 PROCEDURE — G2211 COMPLEX E/M VISIT ADD ON: HCPCS | Mod: S$GLB,,, | Performed by: ANESTHESIOLOGY

## 2024-12-16 PROCEDURE — 1159F MED LIST DOCD IN RCRD: CPT | Mod: CPTII,S$GLB,, | Performed by: ANESTHESIOLOGY

## 2024-12-16 PROCEDURE — 3078F DIAST BP <80 MM HG: CPT | Mod: CPTII,S$GLB,, | Performed by: ANESTHESIOLOGY

## 2024-12-16 RX ORDER — HYDROCODONE BITARTRATE AND ACETAMINOPHEN 7.5; 325 MG/1; MG/1
1 TABLET ORAL EVERY 8 HOURS PRN
Qty: 90 TABLET | Refills: 0 | Status: SHIPPED | OUTPATIENT
Start: 2025-01-14 | End: 2025-02-13

## 2024-12-16 RX ORDER — HYDROCODONE BITARTRATE AND ACETAMINOPHEN 7.5; 325 MG/1; MG/1
1 TABLET ORAL EVERY 8 HOURS PRN
Qty: 90 TABLET | Refills: 0 | Status: SHIPPED | OUTPATIENT
Start: 2025-02-12 | End: 2025-03-14

## 2024-12-16 RX ORDER — HYDROCODONE BITARTRATE AND ACETAMINOPHEN 7.5; 325 MG/1; MG/1
1 TABLET ORAL EVERY 8 HOURS PRN
Qty: 90 TABLET | Refills: 0 | Status: SHIPPED | OUTPATIENT
Start: 2024-12-16 | End: 2025-01-15

## 2024-12-16 NOTE — PROCEDURES
Botulinum Injection  Location: Neck    Date/Time: 12/16/2024 3:00 PM    Performed by: Levi Robert MD  Authorized by: Levi Robert MD      Consent:      Consent obtained:  Verbal     Consent given by:  Patient     Risks discussed:  Pain     Alternatives discussed:  No treatment    Universal protocol:      Relevant documents present and verified:  Yes       Site/side verified:  Yes       Immediately prior to procedure a time out was called:  Yes       Patient identity confirmed:  Verbally with patient  Procedure details:      EMG used?:  No     Electrical stimulation used?:  NoNo     Diluted by:  Preservative free saline     Toxin (Brand):  OnaBoNT-A (Botox)     Total number of units available:  200     Right splenius cervicis:  50 units divided amongst site(s)     Left splenius cervicis:  50 units divided amongst site(s)     Right upper trapezius:  25 units divided amongst site(s)     Left upper trapezius:  25 units divided amongst site(s)     Right horizontal trapezius:  25 units divided amongst site(s)     Left horizontal trapezius:  25 units divided amongst site(s)       Total units injected:  200     Total units wasted:  0    Medications: 200 Units onabotulinumtoxina 100 unit    Post-procedure details:      Patient tolerance of procedure:  Tolerated well, no immediate complications

## 2024-12-16 NOTE — PROGRESS NOTES
Referring Physician: Levi Robert MD    PCP: Fabián Darnell Jr., MD      CC:neck and right shoulder pain    Interval history:  Keshia Garza is a 64 y.o. female with chronic neck pain who returns our clinic.  She has difficult history of extensive cervical spine surgery.  Right-sided cervical MB RFA procedure back in June 2023. Maybe consider repeat in near future   Neck pain is improved with increased range of motion. She does have contractures of the neck due to previous surgery.  She is s/p Botox injection treatments for cervical dystonia.  She does state having moderate benefit from the Botox that helps for 3 months.  She is scheduled for repeat botox treatments today  She also Norco 7.5 mg q.8 hours as needed.  No side effects reported.  She denies any worsening weakness.  No bowel bladder changes.    Prior HPI:   Keshia Garza is a 64 y.o. female  referred to us for neck and right shoulder pain.  Patient with extensive history of cervical spine surgery.  She is s/p C4-T4 posterior cervical fusion on 2/1/2016 with subsequent hardware revision on 5/9/2016.  She states this help with her neck and radiating arm pain.  She had onset worsening neck and right shoulder pain over the past year.  No recent traumatic incident.  She has been evaluated by Neurosurgery.  She underwent updated cervical MRI and cervical CT.  She also had EMG study.  Pain is a constant aching, throbbing pain in her neck.  Pain radiates to her right shoulder.  She has numbness tingling down her bilateral arms.  She denies any worsening weakness.  No bowel bladder changes.  She takes NSAIDs with mild benefits.  She currently takes Lyrica with mild benefits as well.  She underwent a cervical LINDA prior to her surgery with minimal benefit.    Interventional History:  S/p cervical MB RFA on right at C4, 5, 6, 7 on 06/2021, 6/2023 with 50% relief    ROS:  CONSTITUTIONAL: No fevers, chills, night sweats, wt. loss, appetite changes  SKIN: no  "rashes or itching  ENT: No headaches, head trauma, vision changes, or eye pain  LYMPH NODES: None noticed   CV: No chest pain, palpitations.   RESP: No shortness of breath, dyspnea on exertion, cough, wheezing, or hemoptysis  GI: No nausea, emesis, diarrhea, constipation, melena, hematochezia, pain.    : No dysuria, hematuria, urgency, or frequency   HEME: No easy bruising, bleeding problems  PSYCHIATRIC: No depression, anxiety, psychosis, hallucinations.  NEURO: No seizures, memory loss, dizziness or difficulty sleeping  MSK:  Positive HPI      Past Medical History:   Diagnosis Date    Benitez esophagus     Bowel obstruction     2013 and had part of small bowel removal from adhesions    Depression     Difficult intravenous access 6/23/2015    Has medardo cath    Encounter for blood transfusion     GERD (gastroesophageal reflux disease)     H. pylori infection     Treated    Hormone replacement therapy (HRT) 6/23/2015    HTN (hypertension) 6/23/2015    Hypertension     Insomnia     Megaloblastic anemia     Pancreatitis     Portacath in place     PUD (peptic ulcer disease)     bleeding ulcer-2012    Stroke     Thyroid disease     Weight loss 6/23/2015     Past Surgical History:   Procedure Laterality Date    ABDOMINAL ADHESION SURGERY      anterior cervical spine fusion      C5-C7 "years ago" (per report in 2015)    Anterior cervical spine surgery  7/9/2015    BACK SURGERY      CHOLEDOCHODUODENOSTOMY      COLONOSCOPY      ERCP      HYSTERECTOMY      INJECTION OF ANESTHETIC AGENT AROUND MEDIAL BRANCH NERVES INNERVATING CERVICAL FACET JOINT Right 1/28/2021    Procedure: Block-nerve-medial branch-cervical;  Surgeon: Levi Robert MD;  Location: [facility] OR;  Service: Pain Management;  Laterality: Right;  C4, 5, 6, 7     INJECTION OF ANESTHETIC AGENT AROUND MEDIAL BRANCH NERVES INNERVATING CERVICAL FACET JOINT Right 2/2/2021    Procedure: Block-nerve-medial branch-cervical;  Surgeon: Levi Robert MD;  Location: [facility] OR;  " "Service: Pain Management;  Laterality: Right;  C4, 5, 6, 7     LIVER BIOPSY      NISSEN FUNDOPLICATION      oral implant      medardo cath      RADIOFREQUENCY ABLATION OF LUMBAR MEDIAL BRANCH NERVE AT SINGLE LEVEL Right 6/18/2021    Procedure: RADIOFREQUENCY ABLATION, NERVE, SPINAL, Cervical;  Surgeon: Levi Robert MD;  Location: [facility] OR;  Service: Pain Management;  Laterality: Right;  C4,5,6,7    RADIOFREQUENCY THERMOCOAGULATION Right 6/28/2023    Procedure: RADIOFREQUENCY THERMAL COAGULATION;  Surgeon: Levi Robert MD;  Location: [facility] OR;  Service: Pain Management;  Laterality: Right;  c4,5,6,7 RFA    SMALL INTESTINE SURGERY      SMALL INTESTINE SURGERY      UPPER GASTROINTESTINAL ENDOSCOPY       Family History   Problem Relation Name Age of Onset    Cancer Maternal Grandmother  50        colon     Social History     Socioeconomic History    Marital status:    Tobacco Use    Smoking status: Never    Smokeless tobacco: Never   Substance and Sexual Activity    Alcohol use: Yes     Comment: oocasional    Drug use: No    Sexual activity: Never         Medications/Allergies: See med card    Vitals:    12/16/24 0849   BP: 117/77   Pulse: 81   Weight: 54 kg (119 lb 0.8 oz)   Height: 5' 1" (1.549 m)   PainSc:   8   PainLoc: Neck         Physical exam:    GENERAL: A and O x3, the patient appears well groomed and is in no acute distress.  Skin: No rashes or obvious lesions  HEENT: normocephalic, atraumatic  CARDIOVASCULAR:  RRR  LUNGS: non labored breathing  ABDOMEN: soft, nontender   UPPER EXTREMITIES: Normal alignment, normal range of motion, no atrophy, no skin changes,  hair growth and nail growth normal and equal bilaterally. No swelling, no tenderness.    LOWER EXTREMITIES:  Normal alignment, normal range of motion, no atrophy, no skin changes,  hair growth and nail growth normal and equal bilaterally. No swelling, no tenderness.  CERVICAL SPINE:  Cervical spine: ROM is very limited flexion, extension and lateral " rotation with moderate increased pain.  There is contracture of the neck due to previous surgery  Spurling's maneuver causes neck pain to right side.  Myofascial exam: moderate Tenderness to palpation across cervical paraspinous region on right  MENTAL STATUS: normal orientation, speech, language, and fund of knowledge for social situation.  Emotional state appropriate.    CRANIAL NERVES:  II:  PERRL bilaterally,   III,IV,VI: EOMI.    V:  Facial sensation equal bilaterally  VII:  Facial motor function normal.  VIII:  Hearing equal to finger rub bilaterally  IX/X: Gag normal, palate symmetric  XI:  Shoulder shrug equal, head turn equal  XII:  Tongue midline without fasciculations      MOTOR: Tone and bulk: normal bilateral upper and lower Strength: normal   Delt Bi Tri WE WF     R 5 5 5 5 5 5   L 5 5 5 5 5 5     IP ADD ABD Quad TA Gas HAM  R 5 5 5 5 5 5 5  L 5 5 5 5 5 5 5    SENSATION: Light touch and pinprick intact bilaterally  REFLEXES: normal, symmetric, nonbrisk.  Toes down, no clonus. No hoffmans.  GAIT: normal rise, base, steps, and arm swing.        Imaging:  MRI C-spine 1/2021  Impression:  1. Stable postsurgical changes of anterior cervical fusion at C3-C7 and posterior instrumented fusion at C4-T4, as described above, without evidence of acute hardware complication.  2. Multilevel degenerative changes of the cervical spine, as described in detail above, not significantly changed in comparison to the prior exam on 10/24/2018    CT C-spine 1/2021  C2-C3: Left asymmetric posterior disc osteophyte complex.  Mild left facet arthropathy.  Mild left neural foraminal stenosis.  There is no spinal canal stenosis.     C3-C4: Moderate left and mild right facet arthropathy.  Mild bilateral uncovertebral joint spurring.  Moderate left and mild-to-moderate right neural foraminal stenosis.  There is no spinal canal stenosis.     C4-C5: Mild posterior osteophyte complex.  Moderate bilateral facet arthropathy.  Mild  bilateral uncovertebral spurring.  Mild left neural foraminal stenosis.  There is no spinal canal stenosis.     C5-C6: Mild bilateral facet arthropathy.  Mild right uncovertebral joint spurring.  Mild right neural foraminal stenosis.  There is no spinal canal stenosis.     C6-C7: Left asymmetric posterior osteophyte complex.  Left facet arthropathy.  Moderate left uncovertebral spurring.  Mild left neural foraminal stenosis.  There is no spinal canal stenosis.     C7-T1: Bilateral uncovertebral spurring and prominent facet arthropathy.  Moderate to severe bilateral neural foraminal stenosis.  There is no spinal canal stenosis.     T1-2: Bilateral uncovertebral spurring and prominent facet arthropathy.  Severe left and moderate right neural foraminal stenosis.     T2-3:.  No significant neural foraminal or spinal canal stenosis.      Assessment:    Keshia Garza is a 64 y.o. female with neck and right shoulder pain  1. Cervical dystonia    2. Chronic pain disorder    3. Cervical post-laminectomy syndrome    4. Other spondylosis, cervical region        Plan:  1. I have stressed the importance of physical activity and exercise to improve overall health  2. Reviewed pertinent imaging and records with patient.  3.  She does have a component of cervical dystonia with improvement with botox. Plan for repeat botox injections today  4.  Continue Hydrocodone 7.5/325 mg q 8 h #90. UDS 9/2024  5.  Patient with significant benefit following Cervical MB RFA.  Patient will continue to monitor her progress.  May consider repeat procedure in future if pain returns or worsens.   6. Follow up in 3months for botox

## 2025-02-03 DIAGNOSIS — K83.1 OBSTRUCTION OF BILE DUCT: Primary | ICD-10-CM

## 2025-02-04 ENCOUNTER — TELEPHONE (OUTPATIENT)
Dept: SURGERY | Facility: CLINIC | Age: 65
End: 2025-02-04
Payer: MEDICARE

## 2025-02-04 DIAGNOSIS — K83.1 BILIARY OBSTRUCTION: Primary | ICD-10-CM

## 2025-02-11 ENCOUNTER — TELEPHONE (OUTPATIENT)
Dept: SURGERY | Facility: CLINIC | Age: 65
End: 2025-02-11
Payer: MEDICARE

## 2025-02-11 ENCOUNTER — HOSPITAL ENCOUNTER (OUTPATIENT)
Dept: RADIOLOGY | Facility: HOSPITAL | Age: 65
Discharge: HOME | End: 2025-02-11
Attending: SURGERY
Payer: MEDICARE

## 2025-02-11 ENCOUNTER — OFFICE VISIT (OUTPATIENT)
Dept: SURGERY | Facility: CLINIC | Age: 65
End: 2025-02-11
Payer: MEDICARE

## 2025-02-11 VITALS
HEART RATE: 70 BPM | DIASTOLIC BLOOD PRESSURE: 77 MMHG | WEIGHT: 108.25 LBS | HEIGHT: 60 IN | SYSTOLIC BLOOD PRESSURE: 176 MMHG | BODY MASS INDEX: 21.25 KG/M2

## 2025-02-11 DIAGNOSIS — K83.1 OBSTRUCTION OF BILE DUCT: ICD-10-CM

## 2025-02-11 DIAGNOSIS — K83.1 BILIARY OBSTRUCTION: ICD-10-CM

## 2025-02-11 PROCEDURE — 99999 PR PBB SHADOW E&M-EST. PATIENT-LVL III: CPT | Mod: PBBFAC,,, | Performed by: SURGERY

## 2025-02-11 PROCEDURE — 74160 CT ABDOMEN W/CONTRAST: CPT | Mod: TC

## 2025-02-11 PROCEDURE — 3078F DIAST BP <80 MM HG: CPT | Mod: CPTII,S$GLB,, | Performed by: SURGERY

## 2025-02-11 PROCEDURE — 3077F SYST BP >= 140 MM HG: CPT | Mod: CPTII,S$GLB,, | Performed by: SURGERY

## 2025-02-11 PROCEDURE — 99205 OFFICE O/P NEW HI 60 MIN: CPT | Mod: S$GLB,,, | Performed by: SURGERY

## 2025-02-11 PROCEDURE — 25500020 PHARM REV CODE 255: Performed by: SURGERY

## 2025-02-11 PROCEDURE — 3008F BODY MASS INDEX DOCD: CPT | Mod: CPTII,S$GLB,, | Performed by: SURGERY

## 2025-02-11 PROCEDURE — 74160 CT ABDOMEN W/CONTRAST: CPT | Mod: 26,,, | Performed by: STUDENT IN AN ORGANIZED HEALTH CARE EDUCATION/TRAINING PROGRAM

## 2025-02-11 PROCEDURE — 1125F AMNT PAIN NOTED PAIN PRSNT: CPT | Mod: CPTII,S$GLB,, | Performed by: SURGERY

## 2025-02-11 RX ADMIN — IOHEXOL 75 ML: 350 INJECTION, SOLUTION INTRAVENOUS at 03:02

## 2025-02-11 NOTE — CARE UPDATE
Patient was evaluated at bedside in the CT suite after report of IV contrast extravasation (~25 cc). Right upper extremity was examined, showing discrete area of moderate swelling in the dorsal distal forearm. No taught skin. Radial pulse was intact, brisk capillary refill. Patient able to actively range all digits and wrist without pain. Patient reported diminished sensation in right thumb, index, and long fingers. Intact sensation in ring and little fingers. After 5-10 minutes of elevating the right upper extremity, swelling had begun to noticeably improve, and patient reported improvement in right hand sensation. Reviewed instructions with patient regarding home care and signs/symptoms to watch for. This includes keeping RUE elevated and using ice as needed. She was instructed to monitor for worsening symptoms, including pain, swelling, weakness, and loss of sensation in her right hand and right upper extremity, and to present to the emergency room if these symptoms do occur. She expressed understanding.    Case discussed with orthopedic surgery team, who agreed to evaluate patient if symptoms began to worsen.

## 2025-02-11 NOTE — PROGRESS NOTES
"  Encounter Date:  2025    Patient ID: Keshia Garza  Age:  65 y.o. :  1960    Chief Complaint:  No chief complaint on file.      History:    Ms. Garza is a 65 y.o. female who presents with ***.    Past Medical History:   Diagnosis Date    Benitez esophagus     Bowel obstruction      and had part of small bowel removal from adhesions    Depression     Difficult intravenous access 2015    Has medardo cath    Encounter for blood transfusion     GERD (gastroesophageal reflux disease)     H. pylori infection     Treated    Hormone replacement therapy (HRT) 2015    HTN (hypertension) 2015    Hypertension     Insomnia     Megaloblastic anemia     Pancreatitis     Portacath in place     PUD (peptic ulcer disease)     bleeding ulcer-    Stroke     Thyroid disease     Weight loss 2015     Past Surgical History:   Procedure Laterality Date    ABDOMINAL ADHESION SURGERY      anterior cervical spine fusion      C5-C7 "years ago" (per report in )    Anterior cervical spine surgery  2015    BACK SURGERY      CHOLEDOCHODUODENOSTOMY      COLONOSCOPY      ERCP      HYSTERECTOMY      INJECTION OF ANESTHETIC AGENT AROUND MEDIAL BRANCH NERVES INNERVATING CERVICAL FACET JOINT Right 2021    Procedure: Block-nerve-medial branch-cervical;  Surgeon: Levi Robert MD;  Location: [facility] OR;  Service: Pain Management;  Laterality: Right;  C4, 5, 6, 7     INJECTION OF ANESTHETIC AGENT AROUND MEDIAL BRANCH NERVES INNERVATING CERVICAL FACET JOINT Right 2021    Procedure: Block-nerve-medial branch-cervical;  Surgeon: Levi Robert MD;  Location: [facility] OR;  Service: Pain Management;  Laterality: Right;  C4, 5, 6, 7     LIVER BIOPSY      NISSEN FUNDOPLICATION      oral implant      medardo cath      RADIOFREQUENCY ABLATION OF LUMBAR MEDIAL BRANCH NERVE AT SINGLE LEVEL Right 2021    Procedure: RADIOFREQUENCY ABLATION, NERVE, SPINAL, Cervical;  Surgeon: Levi Robert MD;  Location: [facility] OR;  " "Service: Pain Management;  Laterality: Right;  C4,5,6,7    RADIOFREQUENCY THERMOCOAGULATION Right 6/28/2023    Procedure: RADIOFREQUENCY THERMAL COAGULATION;  Surgeon: Levi Robert MD;  Location: [facility];  Service: Pain Management;  Laterality: Right;  c4,5,6,7 RFA    SMALL INTESTINE SURGERY      SMALL INTESTINE SURGERY      UPPER GASTROINTESTINAL ENDOSCOPY       Current Outpatient Medications on File Prior to Visit   Medication Sig Dispense Refill    BD TUBERCULIN SYRINGE 1 mL 25 gauge x 5/8" Syrg       buPROPion (WELLBUTRIN SR) 200 MG SR12   See Instructions, TAKE 1 TABLET DAILY, # 90 tab, 3 Refill(s), Pharmacy: [facility], 155, cm, 04/21/21 9:19:00 CDT, Height/Length Measured, 52.2, kg, 04/21/21 9:19:00 CDT, Weight Dosing      cyanocobalamin, vitamin B-12, 1,000 mcg/mL Kit cyanocobalamin (vit B-12) 1,000 mcg/mL injection solution      estradiol (VIVELLE-DOT) 0.1 mg/24 hr PTSW estradiol 0.1 mg/24 hr semiweekly transdermal patch   Apply 1 patch every 2 weeks by transderm. route for 86 days.      fluticasone propionate (FLONASE) 50 mcg/actuation nasal spray 2 sprays.      HYDROcodone-acetaminophen (NORCO) 7.5-325 mg per tablet Take 1 tablet by mouth every 8 (eight) hours as needed for Pain. 90 tablet 0    [START ON 2/12/2025] HYDROcodone-acetaminophen (NORCO) 7.5-325 mg per tablet Take 1 tablet by mouth every 8 (eight) hours as needed for Pain. 90 tablet 0    lipase-protease-amylase 12,000-38,000-60,000 units CpDR Take 12,000 units of lipase by mouth.      potassium chloride SA (K-DUR,KLOR-CON) 20 MEQ tablet Take 20 mEq by mouth.      SYNTHROID 100 mcg tablet       traZODone (DESYREL) 150 MG tablet trazodone 150 mg tablet   Take 1 tablet every day by oral route at bedtime for 90 days.       No current facility-administered medications on file prior to visit.     Review of patient's allergies indicates:   Allergen Reactions    Dexamethasone Anaphylaxis    Neuromuscular blockers, steroidal " "Anaphylaxis     "ALL STEROIDS"  "ALL STEROIDS"    Penicillins Anaphylaxis     anaphylaxis    Statins-hmg-coa reductase inhibitors Anaphylaxis     Anaphylaxis  Joint stiffness    Ace inhibitors      Muscle stiffness    Barium sulfate (bulk) Hives     SWELLING    Corticosteroids (glucocorticoids)     Nsaids (non-steroidal anti-inflammatory drug)      H/o bleeding ulcers     Ondansetron hcl (pf) Hives     SWELLING       Family History:  Her family history includes Cancer (age of onset: 50) in her maternal grandmother.     Social History:  She reports that she has never smoked. She has never used smokeless tobacco. She reports current alcohol use. She reports that she does not use drugs.     ROS:     Review of Systems  Pertinent positive/negatives detailed in HPI, all other systems negative.     Physical Exam:  LMP  (LMP Unknown)     Physical Exam    Constitutional:  Non-toxic, no acute distress.  Performance status: ECOG ***  Eyes:  Sclerae anicteric, gaze symmetrical  Neck:  Trachea midline, thyroid, non enlarged without palpable nodules,  FROM  Resp:  Easy work of breathing, no wheezes  CV:  Regular pulse, no JVD  Abd:  Soft, non-tender, no masses, no hepatosplenomegaly, no ascites, no superficial varices  {Rectal:  ***}  {:  ***}  Lymphatics:  No cervical, supraclavicular, axillary, or inguinal lymphadenopathy  Musculoskeletal:  ***Ambulatory, normal gait, no muscle wasting  Neuro:  No gross deficits  Psych:  Awake, alert, oriented.  Answers and asks questions appropriately    Data:     Radiology:  I personally reviewed these images: ***    Endoscopy:  ***    Labs:  ***    Pathology:  ***      ICD-10-CM ICD-9-CM    1. Obstruction of bile duct  K83.1 576.2 Ambulatory referral/consult to General Surgery      Plan     1. ***  2. ***    Plan:  ***         "

## 2025-02-13 RX ORDER — PANCRELIPASE 36000; 180000; 114000 [USP'U]/1; [USP'U]/1; [USP'U]/1
2 CAPSULE, DELAYED RELEASE PELLETS ORAL
Qty: 300 CAPSULE | Refills: 11 | Status: SHIPPED | OUTPATIENT
Start: 2025-02-13

## 2025-03-07 NOTE — PROGRESS NOTES
"  Encounter Date:  2025    Patient ID: Keshia Garza  Age:  65 y.o. :  1960    Chief Complaint:    Chief Complaint   Patient presents with    Consult       History:    Ms. Garza is a 65 y.o. female who presents with h/o chronic pancreatitis, biliary obstruction.  She had a choledochoduodenostomy years ago by at [facility] which has kept her without jaundice.  She has about monthly episodes of typical pancreatitis symptoms which self resolve.  She doesn't notice much difference with or without the stents.  She has biliary stents in place and was referred to discuss possible sump syndrome.      Past Medical History:   Diagnosis Date    Benitez esophagus     Bowel obstruction      and had part of small bowel removal from adhesions    Depression     Difficult intravenous access 2015    Has medardo cath    Encounter for blood transfusion     GERD (gastroesophageal reflux disease)     H. pylori infection     Treated    Hormone replacement therapy (HRT) 2015    HTN (hypertension) 2015    Hypertension     Insomnia     Megaloblastic anemia     Pancreatitis     Portacath in place     PUD (peptic ulcer disease)     bleeding ulcer-    Stroke     Thyroid disease     Weight loss 2015     Past Surgical History:   Procedure Laterality Date    ABDOMINAL ADHESION SURGERY      anterior cervical spine fusion      C5-C7 "years ago" (per report in 2015)    Anterior cervical spine surgery  2015    BACK SURGERY      CHOLEDOCHODUODENOSTOMY      COLONOSCOPY      ERCP      HYSTERECTOMY      INJECTION OF ANESTHETIC AGENT AROUND MEDIAL BRANCH NERVES INNERVATING CERVICAL FACET JOINT Right 2021    Procedure: Block-nerve-medial branch-cervical;  Surgeon: Levi Robert MD;  Location: [facility];  Service: Pain Management;  Laterality: Right;  C4, 5, 6, 7     INJECTION OF ANESTHETIC AGENT AROUND MEDIAL BRANCH NERVES INNERVATING CERVICAL FACET JOINT Right 2021    Procedure: Block-nerve-medial " "branch-cervical;  Surgeon: Levi Robert MD;  Location: [facility] OR;  Service: Pain Management;  Laterality: Right;  C4, 5, 6, 7     LIVER BIOPSY      NISSEN FUNDOPLICATION      oral implant      medardo cath      RADIOFREQUENCY ABLATION OF LUMBAR MEDIAL BRANCH NERVE AT SINGLE LEVEL Right 6/18/2021    Procedure: RADIOFREQUENCY ABLATION, NERVE, SPINAL, Cervical;  Surgeon: Levi Robert MD;  Location: [facility] OR;  Service: Pain Management;  Laterality: Right;  C4,5,6,7    RADIOFREQUENCY THERMOCOAGULATION Right 6/28/2023    Procedure: RADIOFREQUENCY THERMAL COAGULATION;  Surgeon: Levi Robert MD;  Location: [facility] OR;  Service: Pain Management;  Laterality: Right;  c4,5,6,7 RFA    SMALL INTESTINE SURGERY      SMALL INTESTINE SURGERY      UPPER GASTROINTESTINAL ENDOSCOPY       Medications Ordered Prior to Encounter[1]  Review of patient's allergies indicates:   Allergen Reactions    Dexamethasone Anaphylaxis    Neuromuscular blockers, steroidal Anaphylaxis     "ALL STEROIDS"  "ALL STEROIDS"    Penicillins Anaphylaxis     anaphylaxis    Statins-hmg-coa reductase inhibitors Anaphylaxis     Anaphylaxis  Joint stiffness    Ace inhibitors      Muscle stiffness    Barium sulfate (bulk) Hives     SWELLING    Corticosteroids (glucocorticoids)     Nsaids (non-steroidal anti-inflammatory drug)      H/o bleeding ulcers     Ondansetron hcl (pf) Hives     SWELLING       Family History:  Her family history includes Cancer (age of onset: 50) in her maternal grandmother.     Social History:  She reports that she has never smoked. She has never used smokeless tobacco. She reports current alcohol use. She reports that she does not use drugs.     ROS:     Review of Systems  Pertinent positive/negatives detailed in HPI, all other systems negative.     Physical Exam:  BP (!) 176/77 (BP Location: Left arm, Patient Position: Sitting)   Pulse 70   Ht 5' (1.524 m)   Wt 49.1 kg (108 lb 3.9 oz)   LMP  (LMP Unknown)   BMI 21.14 kg/m²     Physical " Exam    Constitutional:  Non-toxic, no acute distress.  Performance status: ECOG 1  Eyes:  Sclerae anicteric, gaze symmetrical  Neck:  Trachea midline, thyroid, non enlarged without palpable nodules,  FROM  Resp:  Easy work of breathing, no wheezes  CV:  Regular pulse, no JVD  Abd:  Soft, non-tender, no masses, no hepatosplenomegaly, no ascites, no superficial varices  Musculoskeletal:  Ambulatory, normal gait, no muscle wasting  Neuro:  No gross deficits  Psych:  Awake, alert, oriented.  Answers and asks questions appropriately    Data:     Radiology:  I personally reviewed these images: CT, ERCP images    Endoscopy:  reviewed    Labs:  reviewed    Plan     Chronic pancreatitis s/p remote choledochoduodenostomy.  Has plastic stents via ampulla with debris noted ERCP 7/2024 with sphincterotomy, filling of proximal biliary tree via patent choledochal anastomosis.        Plan:  Reviewed situation and options.  Her initial operation was by and experienced [facility] surgeon and resulted in choledochoduodenostomy, most likely because of hazardous conditions for Whipple.  Most patients with this anatomy will have some sludge in the excluded segment over time and the results of revision/resection are not typically great.  I would not anticipate any effect on pancreatitis symptoms or frequency.  Whipple seems like a lot for this problem which can usually be managed with less morbidity with endoscopic sphincterotomy and working toward stent removal.   She has functional symptoms of exocrine insufficiency and I encouraged her to get back on pancreatic enzyme replacement.    I spent >60 minutes of total time on the encounter, which includes face to face time and non-face to face time preparing to see the patient (e.g., review of tests), obtaining and/or reviewing separately obtained history, documenting clinical information in the electronic or other health record, independently interpreting results (not separately reported) and  "communicating results to the patient/family/caregiver, or care coordination (not separately reported).        Brennen Miranda MD, FACS  Surgical Oncology  [facility] [place], [place]  Office: [phone]  Fax: [phone]     Keshia Graza 1960              [1]   Current Outpatient Medications on File Prior to Visit   Medication Sig Dispense Refill    BD TUBERCULIN SYRINGE 1 mL 25 gauge x 5/8" Syrg       buPROPion (WELLBUTRIN SR) 200 MG SR12   See Instructions, TAKE 1 TABLET DAILY, # 90 tab, 3 Refill(s), [facility], 155, cm, 04/21/21 9:19:00 CDT, Height/Length Measured, 52.2, kg, 04/21/21 9:19:00 CDT, Weight Dosing      cyanocobalamin, vitamin B-12, 1,000 mcg/mL Kit cyanocobalamin (vit B-12) 1,000 mcg/mL injection solution      estradiol (VIVELLE-DOT) 0.1 mg/24 hr PTSW estradiol 0.1 mg/24 hr semiweekly transdermal patch   Apply 1 patch every 2 weeks by transderm. route for 86 days.      fluticasone propionate (FLONASE) 50 mcg/actuation nasal spray 2 sprays.      HYDROcodone-acetaminophen (NORCO) 7.5-325 mg per tablet Take 1 tablet by mouth every 8 (eight) hours as needed for Pain. 90 tablet 0    lipase-protease-amylase 12,000-38,000-60,000 units CpDR Take 12,000 units of lipase by mouth.      potassium chloride SA (K-DUR,KLOR-CON) 20 MEQ tablet Take 20 mEq by mouth.      SYNTHROID 100 mcg tablet       traZODone (DESYREL) 150 MG tablet trazodone 150 mg tablet   Take 1 tablet every day by oral route at bedtime for 90 days.       No current facility-administered medications on file prior to visit.     "

## 2025-03-14 ENCOUNTER — OFFICE VISIT (OUTPATIENT)
Dept: PAIN MEDICINE | Facility: CLINIC | Age: 65
End: 2025-03-14
Payer: MEDICARE

## 2025-03-14 VITALS — BODY MASS INDEX: 21.2 KG/M2 | HEIGHT: 60 IN | WEIGHT: 108 LBS

## 2025-03-14 DIAGNOSIS — G89.4 CHRONIC PAIN DISORDER: ICD-10-CM

## 2025-03-14 DIAGNOSIS — G24.3 CERVICAL DYSTONIA: ICD-10-CM

## 2025-03-14 DIAGNOSIS — M96.1 CERVICAL POST-LAMINECTOMY SYNDROME: ICD-10-CM

## 2025-03-14 DIAGNOSIS — M47.892 OTHER SPONDYLOSIS, CERVICAL REGION: Primary | ICD-10-CM

## 2025-03-14 PROCEDURE — 99999 PR PBB SHADOW E&M-EST. PATIENT-LVL III: CPT | Mod: PBBFAC,,, | Performed by: ANESTHESIOLOGY

## 2025-03-14 RX ORDER — HYDROCODONE BITARTRATE AND ACETAMINOPHEN 7.5; 325 MG/1; MG/1
1 TABLET ORAL EVERY 8 HOURS PRN
Qty: 90 TABLET | Refills: 0 | Status: SHIPPED | OUTPATIENT
Start: 2025-05-11 | End: 2025-06-10

## 2025-03-14 RX ORDER — HYDROCODONE BITARTRATE AND ACETAMINOPHEN 7.5; 325 MG/1; MG/1
1 TABLET ORAL EVERY 8 HOURS PRN
Qty: 90 TABLET | Refills: 0 | Status: SHIPPED | OUTPATIENT
Start: 2025-04-12 | End: 2025-05-12

## 2025-03-14 RX ORDER — HYDROCODONE BITARTRATE AND ACETAMINOPHEN 7.5; 325 MG/1; MG/1
1 TABLET ORAL EVERY 8 HOURS PRN
Qty: 90 TABLET | Refills: 0 | Status: SHIPPED | OUTPATIENT
Start: 2025-03-14 | End: 2025-04-13

## 2025-03-14 NOTE — PROGRESS NOTES
Referring Physician: Levi Robert MD    PCP: Fabián Darnell Jr., MD      CC:neck and right shoulder pain    Interval history:  Keshia Garza is a 65 y.o. female with chronic neck pain who returns our clinic.  She has difficult history of extensive cervical spine surgery.  Right-sided cervical MB RFA procedure back in June 2023. She wishes to have repeat procedrue   Neck pain is improved with increased range of motion. She does have contractures of the neck due to previous surgery.  She is s/p Botox injection treatments for cervical dystonia.  She does state having moderate benefit from the Botox that helps for 3 months.  She is scheduled for repeat botox treatments today  She also Norco 7.5 mg q.8 hours as needed.  No side effects reported.  She denies any worsening weakness.  No bowel bladder changes.    Prior HPI:   Keshia Garza is a 65 y.o. female  referred to us for neck and right shoulder pain.  Patient with extensive history of cervical spine surgery.  She is s/p C4-T4 posterior cervical fusion on 2/1/2016 with subsequent hardware revision on 5/9/2016.  She states this help with her neck and radiating arm pain.  She had onset worsening neck and right shoulder pain over the past year.  No recent traumatic incident.  She has been evaluated by Neurosurgery.  She underwent updated cervical MRI and cervical CT.  She also had EMG study.  Pain is a constant aching, throbbing pain in her neck.  Pain radiates to her right shoulder.  She has numbness tingling down her bilateral arms.  She denies any worsening weakness.  No bowel bladder changes.  She takes NSAIDs with mild benefits.  She currently takes Lyrica with mild benefits as well.  She underwent a cervical LINDA prior to her surgery with minimal benefit.    Interventional History:  S/p cervical MB RFA on right at C4, 5, 6, 7 on 06/2021, 6/2023 with 50% relief    ROS:  CONSTITUTIONAL: No fevers, chills, night sweats, wt. loss, appetite changes  SKIN: no  "rashes or itching  ENT: No headaches, head trauma, vision changes, or eye pain  LYMPH NODES: None noticed   CV: No chest pain, palpitations.   RESP: No shortness of breath, dyspnea on exertion, cough, wheezing, or hemoptysis  GI: No nausea, emesis, diarrhea, constipation, melena, hematochezia, pain.    : No dysuria, hematuria, urgency, or frequency   HEME: No easy bruising, bleeding problems  PSYCHIATRIC: No depression, anxiety, psychosis, hallucinations.  NEURO: No seizures, memory loss, dizziness or difficulty sleeping  MSK:  Positive HPI      Past Medical History:   Diagnosis Date    Benitez esophagus     Bowel obstruction     2013 and had part of small bowel removal from adhesions    Depression     Difficult intravenous access 6/23/2015    Has medardo cath    Encounter for blood transfusion     GERD (gastroesophageal reflux disease)     H. pylori infection     Treated    Hormone replacement therapy (HRT) 6/23/2015    HTN (hypertension) 6/23/2015    Hypertension     Insomnia     Megaloblastic anemia     Pancreatitis     Portacath in place     PUD (peptic ulcer disease)     bleeding ulcer-2012    Stroke     Thyroid disease     Weight loss 6/23/2015     Past Surgical History:   Procedure Laterality Date    ABDOMINAL ADHESION SURGERY      anterior cervical spine fusion      C5-C7 "years ago" (per report in 2015)    Anterior cervical spine surgery  7/9/2015    BACK SURGERY      CHOLEDOCHODUODENOSTOMY      COLONOSCOPY      ERCP      HYSTERECTOMY      INJECTION OF ANESTHETIC AGENT AROUND MEDIAL BRANCH NERVES INNERVATING CERVICAL FACET JOINT Right 1/28/2021    Procedure: Block-nerve-medial branch-cervical;  Surgeon: Levi Robert MD;  Location: [facility] OR;  Service: Pain Management;  Laterality: Right;  C4, 5, 6, 7     INJECTION OF ANESTHETIC AGENT AROUND MEDIAL BRANCH NERVES INNERVATING CERVICAL FACET JOINT Right 2/2/2021    Procedure: Block-nerve-medial branch-cervical;  Surgeon: Levi Robert MD;  Location: [facility] OR;  " Service: Pain Management;  Laterality: Right;  C4, 5, 6, 7     LIVER BIOPSY      NISSEN FUNDOPLICATION      oral implant      medardo cath      RADIOFREQUENCY ABLATION OF LUMBAR MEDIAL BRANCH NERVE AT SINGLE LEVEL Right 6/18/2021    Procedure: RADIOFREQUENCY ABLATION, NERVE, SPINAL, Cervical;  Surgeon: Levi Robert MD;  Location: [facility] OR;  Service: Pain Management;  Laterality: Right;  C4,5,6,7    RADIOFREQUENCY THERMOCOAGULATION Right 6/28/2023    Procedure: RADIOFREQUENCY THERMAL COAGULATION;  Surgeon: Levi Robert MD;  Location: [facility] OR;  Service: Pain Management;  Laterality: Right;  c4,5,6,7 RFA    SMALL INTESTINE SURGERY      SMALL INTESTINE SURGERY      UPPER GASTROINTESTINAL ENDOSCOPY       Family History   Problem Relation Name Age of Onset    Cancer Maternal Grandmother  50        colon     Social History     Socioeconomic History    Marital status:    Tobacco Use    Smoking status: Never    Smokeless tobacco: Never   Substance and Sexual Activity    Alcohol use: Yes     Comment: oocasional    Drug use: No    Sexual activity: Never     Social Drivers of Health     Financial Resource Strain: Low Risk  (2/4/2025)    Overall Financial Resource Strain (CARDIA)     Difficulty of Paying Living Expenses: Not very hard   Food Insecurity: Food Insecurity Present (2/4/2025)    Hunger Vital Sign     Worried About Running Out of Food in the Last Year: Never true     Ran Out of Food in the Last Year: Sometimes true   Physical Activity: Insufficiently Active (2/4/2025)    Exercise Vital Sign     Days of Exercise per Week: 2 days     Minutes of Exercise per Session: 60 min   Stress: No Stress Concern Present (2/4/2025)    [employer] - Occupational Stress Questionnaire     Feeling of Stress : Only a little   Housing Stability: Unknown (2/4/2025)    Housing Stability Vital Sign     Unable to Pay for Housing in the Last Year: No         Medications/Allergies: See med card    Vitals:     03/14/25 0952   Weight: 49 kg (108 lb)   Height: 5' (1.524 m)   PainSc:   8   PainLoc: Neck         Physical exam:    GENERAL: A and O x3, the patient appears well groomed and is in no acute distress.  Skin: No rashes or obvious lesions  HEENT: normocephalic, atraumatic  CARDIOVASCULAR:  RRR  LUNGS: non labored breathing  ABDOMEN: soft, nontender   UPPER EXTREMITIES: Normal alignment, normal range of motion, no atrophy, no skin changes,  hair growth and nail growth normal and equal bilaterally. No swelling, no tenderness.    LOWER EXTREMITIES:  Normal alignment, normal range of motion, no atrophy, no skin changes,  hair growth and nail growth normal and equal bilaterally. No swelling, no tenderness.  CERVICAL SPINE:  Cervical spine: ROM is very limited flexion, extension and lateral rotation with moderate increased pain.  There is contracture of the neck due to previous surgery  Spurling's maneuver causes neck pain to right side.  Myofascial exam: moderate Tenderness to palpation across cervical paraspinous region on right  MENTAL STATUS: normal orientation, speech, language, and fund of knowledge for social situation.  Emotional state appropriate.    CRANIAL NERVES:  II:  PERRL bilaterally,   III,IV,VI: EOMI.    V:  Facial sensation equal bilaterally  VII:  Facial motor function normal.  VIII:  Hearing equal to finger rub bilaterally  IX/X: Gag normal, palate symmetric  XI:  Shoulder shrug equal, head turn equal  XII:  Tongue midline without fasciculations      MOTOR: Tone and bulk: normal bilateral upper and lower Strength: normal   Delt Bi Tri WE WF     R 5 5 5 5 5 5   L 5 5 5 5 5 5     IP ADD ABD Quad TA Gas HAM  R 5 5 5 5 5 5 5  L 5 5 5 5 5 5 5    SENSATION: Light touch and pinprick intact bilaterally  REFLEXES: normal, symmetric, nonbrisk.  Toes down, no clonus. No hoffmans.  GAIT: normal rise, base, steps, and arm swing.        Imaging:  MRI C-spine 1/2021  Impression:  1. Stable postsurgical changes of  anterior cervical fusion at C3-C7 and posterior instrumented fusion at C4-T4, as described above, without evidence of acute hardware complication.  2. Multilevel degenerative changes of the cervical spine, as described in detail above, not significantly changed in comparison to the prior exam on 10/24/2018    CT C-spine 1/2021  C2-C3: Left asymmetric posterior disc osteophyte complex.  Mild left facet arthropathy.  Mild left neural foraminal stenosis.  There is no spinal canal stenosis.     C3-C4: Moderate left and mild right facet arthropathy.  Mild bilateral uncovertebral joint spurring.  Moderate left and mild-to-moderate right neural foraminal stenosis.  There is no spinal canal stenosis.     C4-C5: Mild posterior osteophyte complex.  Moderate bilateral facet arthropathy.  Mild bilateral uncovertebral spurring.  Mild left neural foraminal stenosis.  There is no spinal canal stenosis.     C5-C6: Mild bilateral facet arthropathy.  Mild right uncovertebral joint spurring.  Mild right neural foraminal stenosis.  There is no spinal canal stenosis.     C6-C7: Left asymmetric posterior osteophyte complex.  Left facet arthropathy.  Moderate left uncovertebral spurring.  Mild left neural foraminal stenosis.  There is no spinal canal stenosis.     C7-T1: Bilateral uncovertebral spurring and prominent facet arthropathy.  Moderate to severe bilateral neural foraminal stenosis.  There is no spinal canal stenosis.     T1-2: Bilateral uncovertebral spurring and prominent facet arthropathy.  Severe left and moderate right neural foraminal stenosis.     T2-3:.  No significant neural foraminal or spinal canal stenosis.      Assessment:    Keshia Garza is a 65 y.o. female with neck and right shoulder pain  1. Other spondylosis, cervical region    2. Cervical dystonia    3. Cervical post-laminectomy syndrome    4. Chronic pain disorder        Plan:  1. I have stressed the importance of physical activity and exercise to improve  overall health  2. Reviewed pertinent imaging and records with patient.  3.  She does have a component of cervical dystonia with improvement with botox. Plan for repeat botox injections today  4.  Continue Hydrocodone 7.5/325 mg q 8 h #90. UDS 9/2024  5.  Schedule repeat cervical MB RFA (right C4/5 and C5/6 and C6/7 Facets)  6. Follow up in 3months for botox

## 2025-03-14 NOTE — PROCEDURES
Botulinum Injection  Location: Neck    Date/Time: 3/14/2025 10:40 AM    Performed by: Levi Robert MD  Authorized by: Levi Robert MD      Consent:      Consent obtained:  Verbal     Consent given by:  Patient     Risks discussed:  Pain     Alternatives discussed:  No treatment    Universal protocol:      Relevant documents present and verified:  Yes       Site/side verified:  Yes       Immediately prior to procedure a time out was called:  Yes       Patient identity confirmed:  Verbally with patient  Procedure details:      EMG used?:  No     Electrical stimulation used?:  NoNo     Diluted by:  Preservative free saline     Toxin (Brand):  OnaBoNT-A (Botox)     Total number of units available:  200     Right splenius cervicis:  50 units divided amongst site(s)     Left splenius cervicis:  50 units divided amongst site(s)     Right upper trapezius:  25 units divided amongst site(s)     Left upper trapezius:  25 units divided amongst site(s)     Right horizontal trapezius:  25 units divided amongst site(s)     Left horizontal trapezius:  25 units divided amongst site(s)       Total units injected:  200     Total units wasted:  0    Medications: 200 Units onabotulinumtoxina 100 unit    Post-procedure details:      Patient tolerance of procedure:  Tolerated well, no immediate complications

## 2025-03-14 NOTE — H&P (VIEW-ONLY)
Referring Physician: Levi Robert MD    PCP: Fabián Darnell Jr., MD      CC:neck and right shoulder pain    Interval history:  Keshia Garza is a 65 y.o. female with chronic neck pain who returns our clinic.  She has difficult history of extensive cervical spine surgery.  Right-sided cervical MB RFA procedure back in June 2023. She wishes to have repeat procedrue   Neck pain is improved with increased range of motion. She does have contractures of the neck due to previous surgery.  She is s/p Botox injection treatments for cervical dystonia.  She does state having moderate benefit from the Botox that helps for 3 months.  She is scheduled for repeat botox treatments today  She also Norco 7.5 mg q.8 hours as needed.  No side effects reported.  She denies any worsening weakness.  No bowel bladder changes.    Prior HPI:   Keshia Garza is a 65 y.o. female  referred to us for neck and right shoulder pain.  Patient with extensive history of cervical spine surgery.  She is s/p C4-T4 posterior cervical fusion on 2/1/2016 with subsequent hardware revision on 5/9/2016.  She states this help with her neck and radiating arm pain.  She had onset worsening neck and right shoulder pain over the past year.  No recent traumatic incident.  She has been evaluated by Neurosurgery.  She underwent updated cervical MRI and cervical CT.  She also had EMG study.  Pain is a constant aching, throbbing pain in her neck.  Pain radiates to her right shoulder.  She has numbness tingling down her bilateral arms.  She denies any worsening weakness.  No bowel bladder changes.  She takes NSAIDs with mild benefits.  She currently takes Lyrica with mild benefits as well.  She underwent a cervical LINDA prior to her surgery with minimal benefit.    Interventional History:  S/p cervical MB RFA on right at C4, 5, 6, 7 on 06/2021, 6/2023 with 50% relief    ROS:  CONSTITUTIONAL: No fevers, chills, night sweats, wt. loss, appetite changes  SKIN: no  "rashes or itching  ENT: No headaches, head trauma, vision changes, or eye pain  LYMPH NODES: None noticed   CV: No chest pain, palpitations.   RESP: No shortness of breath, dyspnea on exertion, cough, wheezing, or hemoptysis  GI: No nausea, emesis, diarrhea, constipation, melena, hematochezia, pain.    : No dysuria, hematuria, urgency, or frequency   HEME: No easy bruising, bleeding problems  PSYCHIATRIC: No depression, anxiety, psychosis, hallucinations.  NEURO: No seizures, memory loss, dizziness or difficulty sleeping  MSK:  Positive HPI      Past Medical History:   Diagnosis Date    Benitez esophagus     Bowel obstruction     2013 and had part of small bowel removal from adhesions    Depression     Difficult intravenous access 6/23/2015    Has medardo cath    Encounter for blood transfusion     GERD (gastroesophageal reflux disease)     H. pylori infection     Treated    Hormone replacement therapy (HRT) 6/23/2015    HTN (hypertension) 6/23/2015    Hypertension     Insomnia     Megaloblastic anemia     Pancreatitis     Portacath in place     PUD (peptic ulcer disease)     bleeding ulcer-2012    Stroke     Thyroid disease     Weight loss 6/23/2015     Past Surgical History:   Procedure Laterality Date    ABDOMINAL ADHESION SURGERY      anterior cervical spine fusion      C5-C7 "years ago" (per report in 2015)    Anterior cervical spine surgery  7/9/2015    BACK SURGERY      CHOLEDOCHODUODENOSTOMY      COLONOSCOPY      ERCP      HYSTERECTOMY      INJECTION OF ANESTHETIC AGENT AROUND MEDIAL BRANCH NERVES INNERVATING CERVICAL FACET JOINT Right 1/28/2021    Procedure: Block-nerve-medial branch-cervical;  Surgeon: Levi Robert MD;  Location: [facility] OR;  Service: Pain Management;  Laterality: Right;  C4, 5, 6, 7     INJECTION OF ANESTHETIC AGENT AROUND MEDIAL BRANCH NERVES INNERVATING CERVICAL FACET JOINT Right 2/2/2021    Procedure: Block-nerve-medial branch-cervical;  Surgeon: Levi Robert MD;  Location: [facility] OR;  " Service: Pain Management;  Laterality: Right;  C4, 5, 6, 7     LIVER BIOPSY      NISSEN FUNDOPLICATION      oral implant      medardo cath      RADIOFREQUENCY ABLATION OF LUMBAR MEDIAL BRANCH NERVE AT SINGLE LEVEL Right 6/18/2021    Procedure: RADIOFREQUENCY ABLATION, NERVE, SPINAL, Cervical;  Surgeon: Levi Robert MD;  Location: [facility] OR;  Service: Pain Management;  Laterality: Right;  C4,5,6,7    RADIOFREQUENCY THERMOCOAGULATION Right 6/28/2023    Procedure: RADIOFREQUENCY THERMAL COAGULATION;  Surgeon: Levi Robert MD;  Location: [facility] OR;  Service: Pain Management;  Laterality: Right;  c4,5,6,7 RFA    SMALL INTESTINE SURGERY      SMALL INTESTINE SURGERY      UPPER GASTROINTESTINAL ENDOSCOPY       Family History   Problem Relation Name Age of Onset    Cancer Maternal Grandmother  50        colon     Social History     Socioeconomic History    Marital status:    Tobacco Use    Smoking status: Never    Smokeless tobacco: Never   Substance and Sexual Activity    Alcohol use: Yes     Comment: oocasional    Drug use: No    Sexual activity: Never     Social Drivers of Health     Financial Resource Strain: Low Risk  (2/4/2025)    Overall Financial Resource Strain (CARDIA)     Difficulty of Paying Living Expenses: Not very hard   Food Insecurity: Food Insecurity Present (2/4/2025)    Hunger Vital Sign     Worried About Running Out of Food in the Last Year: Never true     Ran Out of Food in the Last Year: Sometimes true   Physical Activity: Insufficiently Active (2/4/2025)    Exercise Vital Sign     Days of Exercise per Week: 2 days     Minutes of Exercise per Session: 60 min   Stress: No Stress Concern Present (2/4/2025)    [employer] - Occupational Stress Questionnaire     Feeling of Stress : Only a little   Housing Stability: Unknown (2/4/2025)    Housing Stability Vital Sign     Unable to Pay for Housing in the Last Year: No         Medications/Allergies: See med card    Vitals:     03/14/25 0952   Weight: 49 kg (108 lb)   Height: 5' (1.524 m)   PainSc:   8   PainLoc: Neck         Physical exam:    GENERAL: A and O x3, the patient appears well groomed and is in no acute distress.  Skin: No rashes or obvious lesions  HEENT: normocephalic, atraumatic  CARDIOVASCULAR:  RRR  LUNGS: non labored breathing  ABDOMEN: soft, nontender   UPPER EXTREMITIES: Normal alignment, normal range of motion, no atrophy, no skin changes,  hair growth and nail growth normal and equal bilaterally. No swelling, no tenderness.    LOWER EXTREMITIES:  Normal alignment, normal range of motion, no atrophy, no skin changes,  hair growth and nail growth normal and equal bilaterally. No swelling, no tenderness.  CERVICAL SPINE:  Cervical spine: ROM is very limited flexion, extension and lateral rotation with moderate increased pain.  There is contracture of the neck due to previous surgery  Spurling's maneuver causes neck pain to right side.  Myofascial exam: moderate Tenderness to palpation across cervical paraspinous region on right  MENTAL STATUS: normal orientation, speech, language, and fund of knowledge for social situation.  Emotional state appropriate.    CRANIAL NERVES:  II:  PERRL bilaterally,   III,IV,VI: EOMI.    V:  Facial sensation equal bilaterally  VII:  Facial motor function normal.  VIII:  Hearing equal to finger rub bilaterally  IX/X: Gag normal, palate symmetric  XI:  Shoulder shrug equal, head turn equal  XII:  Tongue midline without fasciculations      MOTOR: Tone and bulk: normal bilateral upper and lower Strength: normal   Delt Bi Tri WE WF     R 5 5 5 5 5 5   L 5 5 5 5 5 5     IP ADD ABD Quad TA Gas HAM  R 5 5 5 5 5 5 5  L 5 5 5 5 5 5 5    SENSATION: Light touch and pinprick intact bilaterally  REFLEXES: normal, symmetric, nonbrisk.  Toes down, no clonus. No hoffmans.  GAIT: normal rise, base, steps, and arm swing.        Imaging:  MRI C-spine 1/2021  Impression:  1. Stable postsurgical changes of  anterior cervical fusion at C3-C7 and posterior instrumented fusion at C4-T4, as described above, without evidence of acute hardware complication.  2. Multilevel degenerative changes of the cervical spine, as described in detail above, not significantly changed in comparison to the prior exam on 10/24/2018    CT C-spine 1/2021  C2-C3: Left asymmetric posterior disc osteophyte complex.  Mild left facet arthropathy.  Mild left neural foraminal stenosis.  There is no spinal canal stenosis.     C3-C4: Moderate left and mild right facet arthropathy.  Mild bilateral uncovertebral joint spurring.  Moderate left and mild-to-moderate right neural foraminal stenosis.  There is no spinal canal stenosis.     C4-C5: Mild posterior osteophyte complex.  Moderate bilateral facet arthropathy.  Mild bilateral uncovertebral spurring.  Mild left neural foraminal stenosis.  There is no spinal canal stenosis.     C5-C6: Mild bilateral facet arthropathy.  Mild right uncovertebral joint spurring.  Mild right neural foraminal stenosis.  There is no spinal canal stenosis.     C6-C7: Left asymmetric posterior osteophyte complex.  Left facet arthropathy.  Moderate left uncovertebral spurring.  Mild left neural foraminal stenosis.  There is no spinal canal stenosis.     C7-T1: Bilateral uncovertebral spurring and prominent facet arthropathy.  Moderate to severe bilateral neural foraminal stenosis.  There is no spinal canal stenosis.     T1-2: Bilateral uncovertebral spurring and prominent facet arthropathy.  Severe left and moderate right neural foraminal stenosis.     T2-3:.  No significant neural foraminal or spinal canal stenosis.      Assessment:    Keshia Garza is a 65 y.o. female with neck and right shoulder pain  1. Other spondylosis, cervical region    2. Cervical dystonia    3. Cervical post-laminectomy syndrome    4. Chronic pain disorder        Plan:  1. I have stressed the importance of physical activity and exercise to improve  overall health  2. Reviewed pertinent imaging and records with patient.  3.  She does have a component of cervical dystonia with improvement with botox. Plan for repeat botox injections today  4.  Continue Hydrocodone 7.5/325 mg q 8 h #90. UDS 9/2024  5.  Schedule repeat cervical MB RFA (right C4/5 and C5/6 and C6/7 Facets)  6. Follow up in 3months for botox

## 2025-03-17 ENCOUNTER — TELEPHONE (OUTPATIENT)
Dept: PAIN MEDICINE | Facility: CLINIC | Age: 65
End: 2025-03-17
Payer: MEDICARE

## 2025-03-17 DIAGNOSIS — M47.892 OTHER SPONDYLOSIS, CERVICAL REGION: Primary | ICD-10-CM

## 2025-03-17 NOTE — TELEPHONE ENCOUNTER
Lets do C4/5 and C5/6   ----- Message -----   From: Mikey Gil LPN   Sent: 3/17/2025  11:03 AM CDT   To: Yuesf Murray MD   Subject: FW: Order for ORLIN LEAVITT Dr we can only do 2 levels. Please advise.   ----- Message -----   From: Peace Curtis LPN   Sent: 3/16/2025   6:46 PM CDT   To: Mikey Gil LPN   Subject: RE: Order for ORLIN LEAVITT Dr we can only do 2 levels. Please advise.   ----- Message -----   From: Mikey Gil LPN   Sent: 3/14/2025   3:19 PM CDT   To: Peace Curtis LPN   Subject: FW: Order for ORLIN LEAVITT                       ----- Message -----   From: Yusef Murray MD   Sent: 3/14/2025  11:09 AM CDT   To: [facility] Pain Management Schedulers   Subject: Order for ORLIN LEAVITT                           Patient Name: KERRIETHANORLIN([id number])   Sex: Female   : 1960        PCP: BENNIE TIJERINA JR     Center: [facility]       Types of orders made on 2025: Procedure Request      Order Date:3/14/2025   Ordering User:YUSEF MURRAY [[id number]]   Encounter Provider:Yusef Murray MD [[id number]   Authorizing Provider: Yusef Murray MD [[id number]]   Department:[facility] PAIN MANAGEMENT[[id number]]      Common Order Information   Procedure -> Radiofrequency Ablation (Specify level and laterality) Cmt: Right             C4/5, C5/6 and C6/7

## 2025-04-02 ENCOUNTER — HOSPITAL ENCOUNTER (OUTPATIENT)
Facility: HOSPITAL | Age: 65
Discharge: HOME | End: 2025-04-02
Attending: ANESTHESIOLOGY | Admitting: ANESTHESIOLOGY
Payer: MEDICARE

## 2025-04-02 DIAGNOSIS — M47.892 OTHER SPONDYLOSIS, CERVICAL REGION: ICD-10-CM

## 2025-04-02 PROCEDURE — 64634 DESTROY C/TH FACET JNT ADDL: CPT | Mod: RT,,, | Performed by: ANESTHESIOLOGY

## 2025-04-02 PROCEDURE — 64633 DESTROY CERV/THOR FACET JNT: CPT | Mod: RT | Performed by: ANESTHESIOLOGY

## 2025-04-02 PROCEDURE — 64634 DESTROY C/TH FACET JNT ADDL: CPT | Mod: RT | Performed by: ANESTHESIOLOGY

## 2025-04-02 PROCEDURE — 64633 DESTROY CERV/THOR FACET JNT: CPT | Mod: RT,,, | Performed by: ANESTHESIOLOGY

## 2025-04-02 PROCEDURE — 63600175 PHARM REV CODE 636 W HCPCS: Performed by: ANESTHESIOLOGY

## 2025-04-02 RX ORDER — SODIUM CHLORIDE, SODIUM LACTATE, POTASSIUM CHLORIDE, CALCIUM CHLORIDE 600; 310; 30; 20 MG/100ML; MG/100ML; MG/100ML; MG/100ML
INJECTION, SOLUTION INTRAVENOUS CONTINUOUS
Status: DISCONTINUED | OUTPATIENT
Start: 2025-04-02 | End: 2025-04-02 | Stop reason: HOSPADM

## 2025-04-02 RX ORDER — FENTANYL CITRATE 50 UG/ML
INJECTION, SOLUTION INTRAMUSCULAR; INTRAVENOUS
Status: DISCONTINUED | OUTPATIENT
Start: 2025-04-02 | End: 2025-04-02 | Stop reason: HOSPADM

## 2025-04-02 RX ORDER — MIDAZOLAM HYDROCHLORIDE 1 MG/ML
INJECTION INTRAMUSCULAR; INTRAVENOUS
Status: DISCONTINUED | OUTPATIENT
Start: 2025-04-02 | End: 2025-04-02 | Stop reason: HOSPADM

## 2025-04-02 RX ORDER — BUPIVACAINE HYDROCHLORIDE 2.5 MG/ML
INJECTION, SOLUTION EPIDURAL; INFILTRATION; INTRACAUDAL; PERINEURAL
Status: DISCONTINUED | OUTPATIENT
Start: 2025-04-02 | End: 2025-04-02 | Stop reason: HOSPADM

## 2025-04-02 RX ORDER — LIDOCAINE HYDROCHLORIDE 10 MG/ML
1 INJECTION, SOLUTION EPIDURAL; INFILTRATION; INTRACAUDAL; PERINEURAL ONCE
Status: DISCONTINUED | OUTPATIENT
Start: 2025-04-02 | End: 2025-04-02 | Stop reason: HOSPADM

## 2025-04-02 RX ORDER — LIDOCAINE HYDROCHLORIDE 20 MG/ML
INJECTION, SOLUTION EPIDURAL; INFILTRATION; INTRACAUDAL; PERINEURAL
Status: DISCONTINUED | OUTPATIENT
Start: 2025-04-02 | End: 2025-04-02 | Stop reason: HOSPADM

## 2025-04-02 RX ORDER — LIDOCAINE HYDROCHLORIDE 10 MG/ML
INJECTION, SOLUTION EPIDURAL; INFILTRATION; INTRACAUDAL; PERINEURAL
Status: DISCONTINUED | OUTPATIENT
Start: 2025-04-02 | End: 2025-04-02 | Stop reason: HOSPADM

## 2025-04-02 NOTE — DISCHARGE SUMMARY
[facility] - Periop Services  Discharge Note  Short Stay    Procedure(s) (LRB):  RADIOFREQUENCY THERMAL COAGULATION, NERVE, SPINAL, CERVICAL, POSTERIOR RAMUS, MEDIAL BRANCH (Right)      OUTCOME: Patient tolerated treatment/procedure well without complication and is now ready for discharge.    DISPOSITION: Home or Self Care    FINAL DIAGNOSIS:  <principal problem not specified>    FOLLOWUP: In clinic    DISCHARGE INSTRUCTIONS:    Discharge Procedure Orders   Notify your health care provider if you experience any of the following:  temperature >100.4     Notify your health care provider if you experience any of the following:  severe uncontrolled pain     Notify your health care provider if you experience any of the following:  redness, tenderness, or signs of infection (pain, swelling, redness, odor or green/yellow discharge around incision site)     Activity as tolerated        TIME SPENT ON DISCHARGE: 30 minutes

## 2025-04-02 NOTE — DISCHARGE SUMMARY
[facility] - Periop Services  Discharge Note  Short Stay    Procedure(s) (LRB):  RADIOFREQUENCY THERMAL COAGULATION, NERVE, SPINAL, CERVICAL, POSTERIOR RAMUS, MEDIAL BRANCH (Right)      OUTCOME: Patient tolerated treatment/procedure well without complication and is now ready for discharge.    DISPOSITION: Home or Self Care    FINAL DIAGNOSIS:  <principal problem not specified>    FOLLOWUP: In clinic    DISCHARGE INSTRUCTIONS:    Discharge Procedure Orders   Notify your health care provider if you experience any of the following:  temperature >100.4     Notify your health care provider if you experience any of the following:  severe uncontrolled pain     Notify your health care provider if you experience any of the following:  redness, tenderness, or signs of infection (pain, swelling, redness, odor or green/yellow discharge around incision site)     Activity as tolerated        TIME SPENT ON DISCHARGE: 30 minutes

## 2025-04-02 NOTE — OP NOTE
PROCEDURE DATE: 4/2/2025    PROCEDURE:  Radiofrequency ablation of the medial branch nerves that innervate Right c5/6 and C6/7 facets under fluoroscopy    DIAGNOSIS:  Other spondylosis, cervical region  Post Op Diagnosis: Same    PHYSICIAN: Levi Robert MD    MEDICATIONS INJECTED:  From a mixture of 6ml of 0.25%bupivicaine and 80mg of methylprednisone, 1ml of this solution was injected at each level.    LOCAL ANESTHETIC USED:   1ml of lidocaine 1% at each level    SEDATION MEDICATIONS: RN IV Sedation    ESTIMATED BLOOD LOSS:  None    COMPLICATIONS:  None    TECHNIQUE:   A time-out taken to identify patient and procedure side prior to starting the procedure.  The patient was positioned in the prone position. The patient was prepped and draped in the usual sterile fashion using ChloraPrep and sterile towels.  The levels were determined under fluoroscopic guidance using a slightly posteriorly oblique view.   Local anesthetic was infiltrated superficially at the skin.  Then a 100 mm 20g bent tip RF needle was inserted to the anatomic location of the midsection of the lateral masses (location of medial branch nerves that innervate right C5/6 and C6/7 facets).  A cross table view was then taken to ensure that needles did not cross into neural foramina.  Impedance was less than 800 ohms at each level. Motor stimulation up to 2 volts confirmed there was no nerve root involvement at each level. Medication was then injected slowly.  Ablation was done per level utilizing radiofrequency generator at 80°C for 60 seconds. The patient tolerated the procedure well.     The patient was monitored after the procedure.  Patient was given post procedure and discharge instructions to follow at home.  The patient was discharged in a stable condition

## 2025-04-03 VITALS
TEMPERATURE: 98 F | DIASTOLIC BLOOD PRESSURE: 77 MMHG | SYSTOLIC BLOOD PRESSURE: 129 MMHG | BODY MASS INDEX: 21.2 KG/M2 | OXYGEN SATURATION: 96 % | HEIGHT: 60 IN | RESPIRATION RATE: 16 BRPM | WEIGHT: 108 LBS | HEART RATE: 65 BPM

## 2025-05-19 ENCOUNTER — OFFICE VISIT (OUTPATIENT)
Dept: PAIN MEDICINE | Facility: CLINIC | Age: 65
End: 2025-05-19
Payer: MEDICARE

## 2025-05-19 VITALS — DIASTOLIC BLOOD PRESSURE: 75 MMHG | SYSTOLIC BLOOD PRESSURE: 112 MMHG | HEART RATE: 63 BPM

## 2025-05-19 DIAGNOSIS — M47.892 OTHER SPONDYLOSIS, CERVICAL REGION: ICD-10-CM

## 2025-05-19 DIAGNOSIS — G24.3 CERVICAL DYSTONIA: Primary | ICD-10-CM

## 2025-05-19 DIAGNOSIS — M96.1 CERVICAL POST-LAMINECTOMY SYNDROME: ICD-10-CM

## 2025-05-19 DIAGNOSIS — G89.4 CHRONIC PAIN DISORDER: ICD-10-CM

## 2025-05-19 PROCEDURE — 3078F DIAST BP <80 MM HG: CPT | Mod: CPTII,S$GLB,, | Performed by: ANESTHESIOLOGY

## 2025-05-19 PROCEDURE — 99999 PR PBB SHADOW E&M-EST. PATIENT-LVL III: CPT | Mod: PBBFAC,,, | Performed by: ANESTHESIOLOGY

## 2025-05-19 PROCEDURE — 1159F MED LIST DOCD IN RCRD: CPT | Mod: CPTII,S$GLB,, | Performed by: ANESTHESIOLOGY

## 2025-05-19 PROCEDURE — 99214 OFFICE O/P EST MOD 30 MIN: CPT | Mod: S$GLB,,, | Performed by: ANESTHESIOLOGY

## 2025-05-19 PROCEDURE — 1125F AMNT PAIN NOTED PAIN PRSNT: CPT | Mod: CPTII,S$GLB,, | Performed by: ANESTHESIOLOGY

## 2025-05-19 PROCEDURE — G2211 COMPLEX E/M VISIT ADD ON: HCPCS | Mod: S$GLB,,, | Performed by: ANESTHESIOLOGY

## 2025-05-19 PROCEDURE — 1101F PT FALLS ASSESS-DOCD LE1/YR: CPT | Mod: CPTII,S$GLB,, | Performed by: ANESTHESIOLOGY

## 2025-05-19 PROCEDURE — 3074F SYST BP LT 130 MM HG: CPT | Mod: CPTII,S$GLB,, | Performed by: ANESTHESIOLOGY

## 2025-05-19 PROCEDURE — 3288F FALL RISK ASSESSMENT DOCD: CPT | Mod: CPTII,S$GLB,, | Performed by: ANESTHESIOLOGY

## 2025-05-19 NOTE — PROGRESS NOTES
Referring Physician: No ref. provider found    PCP: Fabián Darnell Jr., MD      CC:neck and right shoulder pain    Interval history:  Keshia Garza is a 65 y.o. female with chronic neck pain who returns our clinic.  She has difficult history of extensive cervical spine surgery.  Right-sided cervical MB RFA procedure was repeat on 4/2025. Reports greater than 60% relief of neck pain.  Neck pain is improved with increased range of motion. She does have contractures of the neck due to previous surgery.  She is s/p Botox injection treatments for cervical dystonia.  She does state having moderate benefit from the Botox that helps for 3 months.  She is scheduled for repeat botox treatments  next month.  She also Norco 7.5 mg q.8 hours as needed.  No side effects reported.  She denies any worsening weakness.  No bowel bladder changes.    Prior HPI:   Keshia Garza is a 65 y.o. female  referred to us for neck and right shoulder pain.  Patient with extensive history of cervical spine surgery.  She is s/p C4-T4 posterior cervical fusion on 2/1/2016 with subsequent hardware revision on 5/9/2016.  She states this help with her neck and radiating arm pain.  She had onset worsening neck and right shoulder pain over the past year.  No recent traumatic incident.  She has been evaluated by Neurosurgery.  She underwent updated cervical MRI and cervical CT.  She also had EMG study.  Pain is a constant aching, throbbing pain in her neck.  Pain radiates to her right shoulder.  She has numbness tingling down her bilateral arms.  She denies any worsening weakness.  No bowel bladder changes.  She takes NSAIDs with mild benefits.  She currently takes Lyrica with mild benefits as well.  She underwent a cervical LINDA prior to her surgery with minimal benefit.    Interventional History:  S/p cervical MB RFA on right at C4, 5, 6, 7 on 06/2021, 6/2023 with 50% relief    ROS:  CONSTITUTIONAL: No fevers, chills, night sweats, wt. loss,  "appetite changes  SKIN: no rashes or itching  ENT: No headaches, head trauma, vision changes, or eye pain  LYMPH NODES: None noticed   CV: No chest pain, palpitations.   RESP: No shortness of breath, dyspnea on exertion, cough, wheezing, or hemoptysis  GI: No nausea, emesis, diarrhea, constipation, melena, hematochezia, pain.    : No dysuria, hematuria, urgency, or frequency   HEME: No easy bruising, bleeding problems  PSYCHIATRIC: No depression, anxiety, psychosis, hallucinations.  NEURO: No seizures, memory loss, dizziness or difficulty sleeping  MSK:  Positive HPI      Past Medical History:   Diagnosis Date    Benitez esophagus     Bowel obstruction     2013 and had part of small bowel removal from adhesions    Depression     Difficult intravenous access 6/23/2015    Has medardo cath    Encounter for blood transfusion     GERD (gastroesophageal reflux disease)     H. pylori infection     Treated    Hormone replacement therapy (HRT) 6/23/2015    HTN (hypertension) 6/23/2015    Hypertension     Insomnia     Megaloblastic anemia     Pancreatitis     Portacath in place     PUD (peptic ulcer disease)     bleeding ulcer-2012    Stroke     Thyroid disease     Weight loss 6/23/2015     Past Surgical History:   Procedure Laterality Date    ABDOMINAL ADHESION SURGERY      anterior cervical spine fusion      C5-C7 "years ago" (per report in 2015)    Anterior cervical spine surgery  7/9/2015    BACK SURGERY      CHOLEDOCHODUODENOSTOMY      COLONOSCOPY      ERCP      HYSTERECTOMY      INJECTION OF ANESTHETIC AGENT AROUND MEDIAL BRANCH NERVES INNERVATING CERVICAL FACET JOINT Right 1/28/2021    Procedure: Block-nerve-medial branch-cervical;  Surgeon: Levi Robert MD;  [facility];  Service: Pain Management;  Laterality: Right;  C4, 5, 6, 7     INJECTION OF ANESTHETIC AGENT AROUND MEDIAL BRANCH NERVES INNERVATING CERVICAL FACET JOINT Right 2/2/2021    Procedure: Block-nerve-medial branch-cervical;  Surgeon: Levi Robert MD;  " Location: [facility] OR;  Service: Pain Management;  Laterality: Right;  C4, 5, 6, 7     LIVER BIOPSY      NISSEN FUNDOPLICATION      oral implant      medardo cath      RADIOFREQUENCY ABLATION OF LUMBAR MEDIAL BRANCH NERVE AT SINGLE LEVEL Right 6/18/2021    Procedure: RADIOFREQUENCY ABLATION, NERVE, SPINAL, Cervical;  Surgeon: Levi Robert MD;  Location: [facility] OR;  Service: Pain Management;  Laterality: Right;  C4,5,6,7    RADIOFREQUENCY THERMAL COAGULATION OF MEDIAL BRANCH OF POSTERIOR RAMUS OF CERVICAL SPINAL NERVE Right 4/2/2025    Procedure: RADIOFREQUENCY THERMAL COAGULATION, NERVE, SPINAL, CERVICAL, POSTERIOR RAMUS, MEDIAL BRANCH;  Surgeon: Levi Robert MD;  Location: [facility] PAIN [facility];  Service: Pain Management;  Laterality: Right;    RADIOFREQUENCY THERMOCOAGULATION Right 6/28/2023    Procedure: RADIOFREQUENCY THERMAL COAGULATION;  Surgeon: Levi Robert MD;  Location: [facility] OR;  Service: Pain Management;  Laterality: Right;  c4,5,6,7 RFA    SMALL INTESTINE SURGERY      SMALL INTESTINE SURGERY      UPPER GASTROINTESTINAL ENDOSCOPY       Family History   Problem Relation Name Age of Onset    Cancer Maternal Grandmother  50        colon     Social History     Socioeconomic History    Marital status:    Tobacco Use    Smoking status: Never    Smokeless tobacco: Never   Substance and Sexual Activity    Alcohol use: Yes     Comment: oocasional    Drug use: No    Sexual activity: Never     Social Drivers of Health     Financial Resource Strain: Low Risk  (2/4/2025)    Overall Financial Resource Strain (CARDIA)     Difficulty of Paying Living Expenses: Not very hard   Food Insecurity: Food Insecurity Present (2/4/2025)    Hunger Vital Sign     Worried About Running Out of Food in the Last Year: Never true     Ran Out of Food in the Last Year: Sometimes true   Physical Activity: Insufficiently Active (2/4/2025)    Exercise Vital Sign     Days of Exercise per Week: 2 days     Minutes of Exercise per Session: 60 min    Stress: No Stress Concern Present (2/4/2025)    [employer] [place] [employer] - Occupational Stress Questionnaire     Feeling of Stress : Only a little   Housing Stability: Unknown (2/4/2025)    Housing Stability Vital Sign     Unable to Pay for Housing in the Last Year: No         Medications/Allergies: See med card    Vitals:    05/19/25 0953   BP: 112/75   Pulse: 63   PainSc:   7   PainLoc: Shoulder         Physical exam:    GENERAL: A and O x3, the patient appears well groomed and is in no acute distress.  Skin: No rashes or obvious lesions  HEENT: normocephalic, atraumatic  CARDIOVASCULAR:  RRR  LUNGS: non labored breathing  ABDOMEN: soft, nontender   UPPER EXTREMITIES: Normal alignment, normal range of motion, no atrophy, no skin changes,  hair growth and nail growth normal and equal bilaterally. No swelling, no tenderness.    LOWER EXTREMITIES:  Normal alignment, normal range of motion, no atrophy, no skin changes,  hair growth and nail growth normal and equal bilaterally. No swelling, no tenderness.  CERVICAL SPINE:  Cervical spine: ROM is very limited flexion, extension and lateral rotation with moderate increased pain.  There is contracture of the neck due to previous surgery  Spurling's maneuver causes neck pain to right side.  Myofascial exam: moderate Tenderness to palpation across cervical paraspinous region on right  MENTAL STATUS: normal orientation, speech, language, and fund of knowledge for social situation.  Emotional state appropriate.    CRANIAL NERVES:  II:  PERRL bilaterally,   III,IV,VI: EOMI.    V:  Facial sensation equal bilaterally  VII:  Facial motor function normal.  VIII:  Hearing equal to finger rub bilaterally  IX/X: Gag normal, palate symmetric  XI:  Shoulder shrug equal, head turn equal  XII:  Tongue midline without fasciculations      MOTOR: Tone and bulk: normal bilateral upper and lower Strength: normal    Delt Bi Tri WE WF     R 5 5 5 5 5 5   L 5 5 5 5 5 5     IP ADD ABD Quad TA Gas HAM  R 5 5 5 5 5 5 5  L 5 5 5 5 5 5 5    SENSATION: Light touch and pinprick intact bilaterally  REFLEXES: normal, symmetric, nonbrisk.  Toes down, no clonus. No hoffmans.  GAIT: normal rise, base, steps, and arm swing.        Imaging:  MRI C-spine 1/2021  Impression:  1. Stable postsurgical changes of anterior cervical fusion at C3-C7 and posterior instrumented fusion at C4-T4, as described above, without evidence of acute hardware complication.  2. Multilevel degenerative changes of the cervical spine, as described in detail above, not significantly changed in comparison to the prior exam on 10/24/2018    CT C-spine 1/2021  C2-C3: Left asymmetric posterior disc osteophyte complex.  Mild left facet arthropathy.  Mild left neural foraminal stenosis.  There is no spinal canal stenosis.     C3-C4: Moderate left and mild right facet arthropathy.  Mild bilateral uncovertebral joint spurring.  Moderate left and mild-to-moderate right neural foraminal stenosis.  There is no spinal canal stenosis.     C4-C5: Mild posterior osteophyte complex.  Moderate bilateral facet arthropathy.  Mild bilateral uncovertebral spurring.  Mild left neural foraminal stenosis.  There is no spinal canal stenosis.     C5-C6: Mild bilateral facet arthropathy.  Mild right uncovertebral joint spurring.  Mild right neural foraminal stenosis.  There is no spinal canal stenosis.     C6-C7: Left asymmetric posterior osteophyte complex.  Left facet arthropathy.  Moderate left uncovertebral spurring.  Mild left neural foraminal stenosis.  There is no spinal canal stenosis.     C7-T1: Bilateral uncovertebral spurring and prominent facet arthropathy.  Moderate to severe bilateral neural foraminal stenosis.  There is no spinal canal stenosis.     T1-2: Bilateral uncovertebral spurring and prominent facet arthropathy.  Severe left and moderate right neural foraminal  stenosis.     T2-3:.  No significant neural foraminal or spinal canal stenosis.      Assessment:    Keshia Garza is a 65 y.o. female with neck and right shoulder pain  1. Cervical dystonia    2. Cervical post-laminectomy syndrome    3. Other spondylosis, cervical region    4. Chronic pain disorder        Plan:  1. I have stressed the importance of physical activity and exercise to improve overall health  2. Reviewed pertinent imaging and records with patient.  3.  She does have a component of cervical dystonia with improvement with botox. Plan for repeat botox injections next month  4.  Continue Hydrocodone 7.5/325 mg q 8 h #90. UDS 9/2024  5.  Patient with significant benefit following Cervical MB RFA.  Patient will continue to monitor her progress.  May consider repeat procedure in future if pain returns or worsens.  6. Follow up in 1 month for botox

## 2025-06-07 DIAGNOSIS — G24.3 CERVICAL DYSTONIA: ICD-10-CM

## 2025-06-07 DIAGNOSIS — M96.1 CERVICAL POST-LAMINECTOMY SYNDROME: ICD-10-CM

## 2025-06-09 RX ORDER — HYDROCODONE BITARTRATE AND ACETAMINOPHEN 7.5; 325 MG/1; MG/1
1 TABLET ORAL EVERY 8 HOURS PRN
Qty: 90 TABLET | Refills: 0 | Status: SHIPPED | OUTPATIENT
Start: 2025-06-09

## 2025-06-16 ENCOUNTER — OFFICE VISIT (OUTPATIENT)
Dept: PAIN MEDICINE | Facility: CLINIC | Age: 65
End: 2025-06-16
Payer: MEDICARE

## 2025-06-16 VITALS
DIASTOLIC BLOOD PRESSURE: 80 MMHG | BODY MASS INDEX: 21.2 KG/M2 | HEART RATE: 68 BPM | SYSTOLIC BLOOD PRESSURE: 135 MMHG | HEIGHT: 60 IN | WEIGHT: 108 LBS

## 2025-06-16 DIAGNOSIS — G24.3 CERVICAL DYSTONIA: Primary | ICD-10-CM

## 2025-06-16 DIAGNOSIS — M47.892 OTHER SPONDYLOSIS, CERVICAL REGION: ICD-10-CM

## 2025-06-16 DIAGNOSIS — M96.1 CERVICAL POST-LAMINECTOMY SYNDROME: ICD-10-CM

## 2025-06-16 DIAGNOSIS — G89.4 CHRONIC PAIN DISORDER: ICD-10-CM

## 2025-06-16 PROCEDURE — 99999 PR PBB SHADOW E&M-EST. PATIENT-LVL III: CPT | Mod: PBBFAC,,, | Performed by: ANESTHESIOLOGY

## 2025-06-16 PROCEDURE — 3008F BODY MASS INDEX DOCD: CPT | Mod: CPTII,S$GLB,, | Performed by: ANESTHESIOLOGY

## 2025-06-16 PROCEDURE — 3288F FALL RISK ASSESSMENT DOCD: CPT | Mod: CPTII,S$GLB,, | Performed by: ANESTHESIOLOGY

## 2025-06-16 PROCEDURE — 1101F PT FALLS ASSESS-DOCD LE1/YR: CPT | Mod: CPTII,S$GLB,, | Performed by: ANESTHESIOLOGY

## 2025-06-16 PROCEDURE — 1159F MED LIST DOCD IN RCRD: CPT | Mod: CPTII,S$GLB,, | Performed by: ANESTHESIOLOGY

## 2025-06-16 PROCEDURE — 99214 OFFICE O/P EST MOD 30 MIN: CPT | Mod: 25,S$GLB,, | Performed by: ANESTHESIOLOGY

## 2025-06-16 PROCEDURE — 3075F SYST BP GE 130 - 139MM HG: CPT | Mod: CPTII,S$GLB,, | Performed by: ANESTHESIOLOGY

## 2025-06-16 PROCEDURE — 1125F AMNT PAIN NOTED PAIN PRSNT: CPT | Mod: CPTII,S$GLB,, | Performed by: ANESTHESIOLOGY

## 2025-06-16 PROCEDURE — 64616 CHEMODENERV MUSC NECK DYSTON: CPT | Mod: 50,S$GLB,, | Performed by: ANESTHESIOLOGY

## 2025-06-16 PROCEDURE — 3079F DIAST BP 80-89 MM HG: CPT | Mod: CPTII,S$GLB,, | Performed by: ANESTHESIOLOGY

## 2025-06-16 RX ORDER — ASPIRIN 325 MG
325 TABLET, DELAYED RELEASE (ENTERIC COATED) ORAL DAILY
COMMUNITY

## 2025-06-16 RX ORDER — HYDROCODONE BITARTRATE AND ACETAMINOPHEN 7.5; 325 MG/1; MG/1
1 TABLET ORAL EVERY 8 HOURS PRN
Qty: 90 TABLET | Refills: 0 | Status: SHIPPED | OUTPATIENT
Start: 2025-08-06

## 2025-06-16 RX ORDER — SYRINGE WITH NEEDLE, 1 ML 25GX5/8"
SYRINGE, EMPTY DISPOSABLE MISCELLANEOUS
COMMUNITY
Start: 2025-05-14

## 2025-06-16 RX ORDER — HYDROCODONE BITARTRATE AND ACETAMINOPHEN 7.5; 325 MG/1; MG/1
1 TABLET ORAL EVERY 8 HOURS PRN
Qty: 90 TABLET | Refills: 0 | Status: SHIPPED | OUTPATIENT
Start: 2025-07-08

## 2025-06-16 RX ORDER — NAPROXEN 500 MG/1
500 TABLET ORAL 2 TIMES DAILY
COMMUNITY
Start: 2025-06-05

## 2025-06-16 RX ORDER — HYDROCODONE BITARTRATE AND ACETAMINOPHEN 7.5; 325 MG/1; MG/1
1 TABLET ORAL EVERY 8 HOURS PRN
Qty: 90 TABLET | Refills: 0 | Status: SHIPPED | OUTPATIENT
Start: 2025-09-04

## 2025-06-16 NOTE — PROCEDURES
Botulinum Injection  Location: Neck    Date/Time: 6/16/2025 10:20 AM    Performed by: Levi Robert MD  Authorized by: Levi Robert MD      Consent:      Consent obtained:  Verbal     Consent given by:  Patient     Risks discussed:  Pain     Alternatives discussed:  No treatment    Universal protocol:      Relevant documents present and verified:  Yes       Site/side verified:  Yes       Immediately prior to procedure a time out was called:  Yes       Patient identity confirmed:  Verbally with patient  Procedure details:      EMG used?:  No     Electrical stimulation used?:  NoNo     Diluted by:  Preservative free saline     Laterality: Bilateral     Toxin (Brand):  OnaBoNT-A (Botox)     Total number of units available:  200     Right splenius cervicis:  50 units divided amongst site(s)     Left splenius cervicis:  50 units divided amongst site(s)     Right upper trapezius:  25 units divided amongst site(s)     Left upper trapezius:  25 units divided amongst site(s)     Right horizontal trapezius:  25 units divided amongst site(s)     Left horizontal trapezius:  25 units divided amongst site(s)       Total units injected:  200     Total units wasted:  0    Medications: 200 Units onabotulinumtoxina 100 unit    Post-procedure details:      Patient tolerance of procedure:  Tolerated well, no immediate complications

## 2025-06-16 NOTE — PROGRESS NOTES
Referring Physician: Levi Robert MD    PCP: Fabián Darnell Jr., MD      CC:neck and right shoulder pain    Interval history:  Keshia Garza is a 65 y.o. female with chronic neck pain who returns our clinic.  She has difficult history of extensive cervical spine surgery.  Right-sided cervical MB RFA procedure was repeat on 4/2025. Reports greater than 60% relief of neck pain.  Neck pain is improved with increased range of motion. She does have contractures of the neck due to previous surgery.  She is s/p Botox injection treatments for cervical dystonia.  She does state having moderate benefit from the Botox that helps for 3 months.  She is scheduled for repeat botox treatments today.  She also Norco 7.5 mg q.8 hours as needed.  No side effects reported.  She denies any worsening weakness.  No bowel bladder changes.    Prior HPI:   Keshia Garza is a 65 y.o. female  referred to us for neck and right shoulder pain.  Patient with extensive history of cervical spine surgery.  She is s/p C4-T4 posterior cervical fusion on 2/1/2016 with subsequent hardware revision on 5/9/2016.  She states this help with her neck and radiating arm pain.  She had onset worsening neck and right shoulder pain over the past year.  No recent traumatic incident.  She has been evaluated by Neurosurgery.  She underwent updated cervical MRI and cervical CT.  She also had EMG study.  Pain is a constant aching, throbbing pain in her neck.  Pain radiates to her right shoulder.  She has numbness tingling down her bilateral arms.  She denies any worsening weakness.  No bowel bladder changes.  She takes NSAIDs with mild benefits.  She currently takes Lyrica with mild benefits as well.  She underwent a cervical LINDA prior to her surgery with minimal benefit.    Interventional History:  S/p cervical MB RFA on right at C4, 5, 6, 7 on 06/2021, 6/2023 with 50% relief    ROS:  CONSTITUTIONAL: No fevers, chills, night sweats, wt. loss, appetite  "changes  SKIN: no rashes or itching  ENT: No headaches, head trauma, vision changes, or eye pain  LYMPH NODES: None noticed   CV: No chest pain, palpitations.   RESP: No shortness of breath, dyspnea on exertion, cough, wheezing, or hemoptysis  GI: No nausea, emesis, diarrhea, constipation, melena, hematochezia, pain.    : No dysuria, hematuria, urgency, or frequency   HEME: No easy bruising, bleeding problems  PSYCHIATRIC: No depression, anxiety, psychosis, hallucinations.  NEURO: No seizures, memory loss, dizziness or difficulty sleeping  MSK:  Positive HPI      Past Medical History:   Diagnosis Date    Benitez esophagus     Bowel obstruction     2013 and had part of small bowel removal from adhesions    Depression     Difficult intravenous access 6/23/2015    Has medardo cath    Encounter for blood transfusion     GERD (gastroesophageal reflux disease)     H. pylori infection     Treated    Hormone replacement therapy (HRT) 6/23/2015    HTN (hypertension) 6/23/2015    Hypertension     Insomnia     Megaloblastic anemia     Pancreatitis     Portacath in place     PUD (peptic ulcer disease)     bleeding ulcer-2012    Stroke     Thyroid disease     Weight loss 6/23/2015     Past Surgical History:   Procedure Laterality Date    ABDOMINAL ADHESION SURGERY      anterior cervical spine fusion      C5-C7 "years ago" (per report in 2015)    Anterior cervical spine surgery  7/9/2015    BACK SURGERY      CHOLEDOCHODUODENOSTOMY      COLONOSCOPY      ERCP      HYSTERECTOMY      INJECTION OF ANESTHETIC AGENT AROUND MEDIAL BRANCH NERVES INNERVATING CERVICAL FACET JOINT Right 1/28/2021    Procedure: Block-nerve-medial branch-cervical;  Surgeon: Levi Robert MD;  Location: [facility] OR;  Service: Pain Management;  Laterality: Right;  C4, 5, 6, 7     INJECTION OF ANESTHETIC AGENT AROUND MEDIAL BRANCH NERVES INNERVATING CERVICAL FACET JOINT Right 2/2/2021    Procedure: Block-nerve-medial branch-cervical;  Surgeon: Levi Robert MD;  " Location: [facility] OR;  Service: Pain Management;  Laterality: Right;  C4, 5, 6, 7     LIVER BIOPSY      NISSEN FUNDOPLICATION      oral implant      medardo cath      RADIOFREQUENCY ABLATION OF LUMBAR MEDIAL BRANCH NERVE AT SINGLE LEVEL Right 6/18/2021    Procedure: RADIOFREQUENCY ABLATION, NERVE, SPINAL, Cervical;  Surgeon: Levi Robert MD;  Location: [facility] OR;  Service: Pain Management;  Laterality: Right;  C4,5,6,7    RADIOFREQUENCY THERMAL COAGULATION OF MEDIAL BRANCH OF POSTERIOR RAMUS OF CERVICAL SPINAL NERVE Right 4/2/2025    Procedure: RADIOFREQUENCY THERMAL COAGULATION, NERVE, SPINAL, CERVICAL, POSTERIOR RAMUS, MEDIAL BRANCH;  Surgeon: Levi Robert MD;  Location: [facility];  Service: Pain Management;  Laterality: Right;    RADIOFREQUENCY THERMOCOAGULATION Right 6/28/2023    Procedure: RADIOFREQUENCY THERMAL COAGULATION;  Surgeon: Levi Robert MD;  Location: [facility] OR;  Service: Pain Management;  Laterality: Right;  c4,5,6,7 RFA    SMALL INTESTINE SURGERY      SMALL INTESTINE SURGERY      UPPER GASTROINTESTINAL ENDOSCOPY       Family History   Problem Relation Name Age of Onset    Cancer Maternal Grandmother  50        colon     Social History     Socioeconomic History    Marital status:    Tobacco Use    Smoking status: Never    Smokeless tobacco: Never   Substance and Sexual Activity    Alcohol use: Yes     Comment: oocasional    Drug use: No    Sexual activity: Never     Social Drivers of Health     Financial Resource Strain: Low Risk  (2/4/2025)    Overall Financial Resource Strain (CARDIA)     Difficulty of Paying Living Expenses: Not very hard   Food Insecurity: Food Insecurity Present (2/4/2025)    Hunger Vital Sign     Worried About Running Out of Food in the Last Year: Never true     Ran Out of Food in the Last Year: Sometimes true   Physical Activity: Insufficiently Active (2/4/2025)    Exercise Vital Sign     Days of Exercise per Week: 2 days     Minutes of Exercise per Session: 60 min    Stress: No Stress Concern Present (2/4/2025)    [employer] - Occupational Stress Questionnaire     Feeling of Stress : Only a little   Housing Stability: Unknown (2/4/2025)    Housing Stability Vital Sign     Unable to Pay for Housing in the Last Year: No         Medications/Allergies: See med card    Vitals:    06/16/25 0926   BP: 135/80   Pulse: 68   Weight: 49 kg (108 lb 0.4 oz)   Height: 5' (1.524 m)   PainSc:   9   PainLoc: Neck         Physical exam:    GENERAL: A and O x3, the patient appears well groomed and is in no acute distress.  Skin: No rashes or obvious lesions  HEENT: normocephalic, atraumatic  CARDIOVASCULAR:  RRR  LUNGS: non labored breathing  ABDOMEN: soft, nontender   UPPER EXTREMITIES: Normal alignment, normal range of motion, no atrophy, no skin changes,  hair growth and nail growth normal and equal bilaterally. No swelling, no tenderness.    LOWER EXTREMITIES:  Normal alignment, normal range of motion, no atrophy, no skin changes,  hair growth and nail growth normal and equal bilaterally. No swelling, no tenderness.  CERVICAL SPINE:  Cervical spine: ROM is very limited flexion, extension and lateral rotation with moderate increased pain.  There is contracture of the neck due to previous surgery  Spurling's maneuver causes neck pain to right side.  Myofascial exam: moderate Tenderness to palpation across cervical paraspinous region on right  MENTAL STATUS: normal orientation, speech, language, and fund of knowledge for social situation.  Emotional state appropriate.    CRANIAL NERVES:  II:  PERRL bilaterally,   III,IV,VI: EOMI.    V:  Facial sensation equal bilaterally  VII:  Facial motor function normal.  VIII:  Hearing equal to finger rub bilaterally  IX/X: Gag normal, palate symmetric  XI:  Shoulder shrug equal, head turn equal  XII:  Tongue midline without fasciculations      MOTOR: Tone and bulk: normal bilateral upper and lower Strength: normal    Delt Bi Tri WE WF     R 5 5 5 5 5 5   L 5 5 5 5 5 5     IP ADD ABD Quad TA Gas HAM  R 5 5 5 5 5 5 5  L 5 5 5 5 5 5 5    SENSATION: Light touch and pinprick intact bilaterally  REFLEXES: normal, symmetric, nonbrisk.  Toes down, no clonus. No hoffmans.  GAIT: normal rise, base, steps, and arm swing.        Imaging:  MRI C-spine 1/2021  Impression:  1. Stable postsurgical changes of anterior cervical fusion at C3-C7 and posterior instrumented fusion at C4-T4, as described above, without evidence of acute hardware complication.  2. Multilevel degenerative changes of the cervical spine, as described in detail above, not significantly changed in comparison to the prior exam on 10/24/2018    CT C-spine 1/2021  C2-C3: Left asymmetric posterior disc osteophyte complex.  Mild left facet arthropathy.  Mild left neural foraminal stenosis.  There is no spinal canal stenosis.     C3-C4: Moderate left and mild right facet arthropathy.  Mild bilateral uncovertebral joint spurring.  Moderate left and mild-to-moderate right neural foraminal stenosis.  There is no spinal canal stenosis.     C4-C5: Mild posterior osteophyte complex.  Moderate bilateral facet arthropathy.  Mild bilateral uncovertebral spurring.  Mild left neural foraminal stenosis.  There is no spinal canal stenosis.     C5-C6: Mild bilateral facet arthropathy.  Mild right uncovertebral joint spurring.  Mild right neural foraminal stenosis.  There is no spinal canal stenosis.     C6-C7: Left asymmetric posterior osteophyte complex.  Left facet arthropathy.  Moderate left uncovertebral spurring.  Mild left neural foraminal stenosis.  There is no spinal canal stenosis.     C7-T1: Bilateral uncovertebral spurring and prominent facet arthropathy.  Moderate to severe bilateral neural foraminal stenosis.  There is no spinal canal stenosis.     T1-2: Bilateral uncovertebral spurring and prominent facet arthropathy.  Severe left and moderate right neural foraminal  stenosis.     T2-3:.  No significant neural foraminal or spinal canal stenosis.      Assessment:    Keshia Garza is a 65 y.o. female with neck and right shoulder pain  1. Cervical dystonia    2. Cervical post-laminectomy syndrome    3. Other spondylosis, cervical region    4. Chronic pain disorder        Plan:  1. I have stressed the importance of physical activity and exercise to improve overall health  2. Reviewed pertinent imaging and records with patient.  3.  She does have a component of cervical dystonia with improvement with botox. Plan for repeat botox injections today  4.  Continue Hydrocodone 7.5/325 mg q 8 h #90. UDS next visit  5.  Patient with significant benefit following Cervical MB RFA.  Patient will continue to monitor her progress.  May consider repeat procedure in future if pain returns or worsens.  6. Follow up in 3 months for botox

## (undated) DEVICE — NDL SPINAL 25GX3.5 SPINOCAN

## (undated) DEVICE — DRAPE MEDIUM SHEET 40X70IN

## (undated) DEVICE — SYR DISP LL 5CC

## (undated) DEVICE — SHEET DRAPE MEDIUM

## (undated) DEVICE — SYS LABEL CORRECT MED

## (undated) DEVICE — PAD ELECTROSURGICAL PAT PLATE

## (undated) DEVICE — PAD GROUNDING DISPER ELECTRODE

## (undated) DEVICE — NDL HYPODERMIC BLUNT 18G 1.5IN

## (undated) DEVICE — CANNULA RADIOPAQUE 20G CURVED

## (undated) DEVICE — SYR 10CC LUER LOCK

## (undated) DEVICE — APPLICATOR CHLORAPREP ORN 26ML

## (undated) DEVICE — SYR LUER LOCK STERILE 10ML

## (undated) DEVICE — DRAPE C ARM 42 X 120 10/BX

## (undated) DEVICE — COVER PROXIMA MAYO STAND

## (undated) DEVICE — GLOVE SURG ULTRA TOUCH 7.5

## (undated) DEVICE — STRAP OR TABLE 5IN X 72IN

## (undated) DEVICE — CHLORAPREP 10.5 ML APPLICATOR

## (undated) DEVICE — SOL NACL 0.9% INJ 500ML BG

## (undated) DEVICE — KIT COOLED RF 75MM SGL PROBE

## (undated) DEVICE — DRAPE THREE-QTR REINF 53X77IN

## (undated) DEVICE — NDL SAFETY 25G X 1.5 ECLIPSE

## (undated) DEVICE — TOWEL OR DISP STRL BLUE 4/PK

## (undated) DEVICE — NDL HYPODERMIC SAF 25G 1.5IN

## (undated) DEVICE — SPONGE BULKEE II ABSRB 6X6.75